# Patient Record
Sex: MALE | Race: WHITE | Employment: OTHER | URBAN - METROPOLITAN AREA
[De-identification: names, ages, dates, MRNs, and addresses within clinical notes are randomized per-mention and may not be internally consistent; named-entity substitution may affect disease eponyms.]

---

## 2021-01-01 ENCOUNTER — APPOINTMENT (OUTPATIENT)
Dept: GENERAL RADIOLOGY | Age: 86
DRG: 291 | End: 2021-01-01
Attending: PHYSICIAN ASSISTANT
Payer: MEDICARE

## 2021-01-01 ENCOUNTER — PATIENT OUTREACH (OUTPATIENT)
Dept: CASE MANAGEMENT | Age: 86
End: 2021-01-01

## 2021-01-01 ENCOUNTER — APPOINTMENT (OUTPATIENT)
Dept: CT IMAGING | Age: 86
DRG: 291 | End: 2021-01-01
Attending: STUDENT IN AN ORGANIZED HEALTH CARE EDUCATION/TRAINING PROGRAM
Payer: MEDICARE

## 2021-01-01 ENCOUNTER — APPOINTMENT (OUTPATIENT)
Dept: CT IMAGING | Age: 86
DRG: 291 | End: 2021-01-01
Attending: INTERNAL MEDICINE
Payer: MEDICARE

## 2021-01-01 ENCOUNTER — APPOINTMENT (OUTPATIENT)
Dept: GENERAL RADIOLOGY | Age: 86
DRG: 291 | End: 2021-01-01
Attending: STUDENT IN AN ORGANIZED HEALTH CARE EDUCATION/TRAINING PROGRAM
Payer: MEDICARE

## 2021-01-01 ENCOUNTER — HOSPITAL ENCOUNTER (INPATIENT)
Age: 86
LOS: 4 days | Discharge: NURSING, REHAB OR LONG-TERM CARE | DRG: 291 | End: 2021-12-24
Attending: STUDENT IN AN ORGANIZED HEALTH CARE EDUCATION/TRAINING PROGRAM | Admitting: INTERNAL MEDICINE
Payer: MEDICARE

## 2021-01-01 ENCOUNTER — APPOINTMENT (OUTPATIENT)
Dept: NON INVASIVE DIAGNOSTICS | Age: 86
DRG: 291 | End: 2021-01-01
Attending: INTERNAL MEDICINE
Payer: MEDICARE

## 2021-01-01 VITALS
BODY MASS INDEX: 22.84 KG/M2 | DIASTOLIC BLOOD PRESSURE: 77 MMHG | OXYGEN SATURATION: 99 % | WEIGHT: 163.14 LBS | HEIGHT: 71 IN | HEART RATE: 88 BPM | TEMPERATURE: 98.6 F | RESPIRATION RATE: 16 BRPM | SYSTOLIC BLOOD PRESSURE: 124 MMHG

## 2021-01-01 DIAGNOSIS — J90 PLEURAL EFFUSION: ICD-10-CM

## 2021-01-01 DIAGNOSIS — S09.90XA INJURY OF HEAD, INITIAL ENCOUNTER: ICD-10-CM

## 2021-01-01 DIAGNOSIS — J18.9 PNEUMONIA OF LEFT LOWER LOBE DUE TO INFECTIOUS ORGANISM: Primary | ICD-10-CM

## 2021-01-01 LAB
ALBUMIN SERPL-MCNC: 2.3 G/DL (ref 3.5–5)
ALBUMIN SERPL-MCNC: 2.7 G/DL (ref 3.5–5)
ALBUMIN/GLOB SERPL: 0.5 {RATIO} (ref 1.1–2.2)
ALBUMIN/GLOB SERPL: 0.6 {RATIO} (ref 1.1–2.2)
ALP SERPL-CCNC: 72 U/L (ref 45–117)
ALP SERPL-CCNC: 79 U/L (ref 45–117)
ALT SERPL-CCNC: 6 U/L (ref 12–78)
ALT SERPL-CCNC: <6 U/L (ref 12–78)
ANION GAP SERPL CALC-SCNC: 3 MMOL/L (ref 5–15)
ANION GAP SERPL CALC-SCNC: 4 MMOL/L (ref 5–15)
ANION GAP SERPL CALC-SCNC: 4 MMOL/L (ref 5–15)
ANION GAP SERPL CALC-SCNC: 6 MMOL/L (ref 5–15)
ANION GAP SERPL CALC-SCNC: 6 MMOL/L (ref 5–15)
APPEARANCE UR: ABNORMAL
AST SERPL-CCNC: 10 U/L (ref 15–37)
AST SERPL-CCNC: 13 U/L (ref 15–37)
ATRIAL RATE: 129 BPM
BACTERIA SPEC CULT: NORMAL
BACTERIA URNS QL MICRO: NEGATIVE /HPF
BASOPHILS # BLD: 0 K/UL (ref 0–0.1)
BASOPHILS NFR BLD: 0 % (ref 0–1)
BILIRUB SERPL-MCNC: 0.8 MG/DL (ref 0.2–1)
BILIRUB SERPL-MCNC: 1 MG/DL (ref 0.2–1)
BILIRUB UR QL CFM: NEGATIVE
BNP SERPL-MCNC: 3684 PG/ML
BUN SERPL-MCNC: 20 MG/DL (ref 6–20)
BUN SERPL-MCNC: 20 MG/DL (ref 6–20)
BUN SERPL-MCNC: 22 MG/DL (ref 6–20)
BUN SERPL-MCNC: 31 MG/DL (ref 6–20)
BUN SERPL-MCNC: 38 MG/DL (ref 6–20)
BUN/CREAT SERPL: 15 (ref 12–20)
BUN/CREAT SERPL: 15 (ref 12–20)
BUN/CREAT SERPL: 16 (ref 12–20)
BUN/CREAT SERPL: 19 (ref 12–20)
BUN/CREAT SERPL: 23 (ref 12–20)
CALCIUM SERPL-MCNC: 8.3 MG/DL (ref 8.5–10.1)
CALCIUM SERPL-MCNC: 8.4 MG/DL (ref 8.5–10.1)
CALCIUM SERPL-MCNC: 8.5 MG/DL (ref 8.5–10.1)
CALCIUM SERPL-MCNC: 8.7 MG/DL (ref 8.5–10.1)
CALCIUM SERPL-MCNC: 9 MG/DL (ref 8.5–10.1)
CALCULATED R AXIS, ECG10: 52 DEGREES
CALCULATED T AXIS, ECG11: 37 DEGREES
CHLORIDE SERPL-SCNC: 100 MMOL/L (ref 97–108)
CHLORIDE SERPL-SCNC: 100 MMOL/L (ref 97–108)
CHLORIDE SERPL-SCNC: 102 MMOL/L (ref 97–108)
CHLORIDE SERPL-SCNC: 107 MMOL/L (ref 97–108)
CHLORIDE SERPL-SCNC: 110 MMOL/L (ref 97–108)
CO2 SERPL-SCNC: 28 MMOL/L (ref 21–32)
CO2 SERPL-SCNC: 28 MMOL/L (ref 21–32)
CO2 SERPL-SCNC: 33 MMOL/L (ref 21–32)
CO2 SERPL-SCNC: 35 MMOL/L (ref 21–32)
CO2 SERPL-SCNC: 37 MMOL/L (ref 21–32)
COLOR UR: ABNORMAL
COVID-19 RAPID TEST, COVR: NOT DETECTED
COVID-19 RAPID TEST, COVR: NOT DETECTED
CREAT SERPL-MCNC: 1.24 MG/DL (ref 0.7–1.3)
CREAT SERPL-MCNC: 1.36 MG/DL (ref 0.7–1.3)
CREAT SERPL-MCNC: 1.42 MG/DL (ref 0.7–1.3)
CREAT SERPL-MCNC: 1.64 MG/DL (ref 0.7–1.3)
CREAT SERPL-MCNC: 1.65 MG/DL (ref 0.7–1.3)
DIAGNOSIS, 93000: NORMAL
DIFFERENTIAL METHOD BLD: ABNORMAL
ECHO AO ASC DIAM: 3.6 CM
ECHO AO ASCENDING AORTA INDEX: 1.66 CM/M2
ECHO AV AREA PEAK VELOCITY: 2.9 CM2
ECHO AV AREA PEAK VELOCITY: 2.9 CM2
ECHO AV AREA VTI: 3 CM2
ECHO AV AREA VTI: 3 CM2
ECHO AV MEAN GRADIENT: 3 MMHG
ECHO AV MEAN VELOCITY: 0.9 M/S
ECHO AV PEAK GRADIENT: 5 MMHG
ECHO AV PEAK VELOCITY: 1.1 M/S
ECHO AV VELOCITY RATIO: 0.64
ECHO AV VTI: 22.2 CM
ECHO LA DIAMETER INDEX: 1.94 CM/M2
ECHO LA DIAMETER: 4.2 CM
ECHO LA VOL 2C: 77 ML (ref 18–58)
ECHO LA VOL 4C: 83 ML (ref 18–58)
ECHO LA VOL BP: 80 ML (ref 18–58)
ECHO LA VOL BP: 80 ML (ref 18–58)
ECHO LA VOLUME AREA LENGTH: 87 ML
ECHO LA VOLUME INDEX A2C: 35 ML/M2 (ref 16–34)
ECHO LA VOLUME INDEX A4C: 38 ML/M2 (ref 16–34)
ECHO LA VOLUME INDEX AREA LENGTH: 40 ML/M2 (ref 16–34)
ECHO LV E' LATERAL VELOCITY: 13 CM/S
ECHO LV E' SEPTAL VELOCITY: 9 CM/S
ECHO LV FRACTIONAL SHORTENING: 30 % (ref 28–44)
ECHO LV INTERNAL DIMENSION DIASTOLE INDEX: 1.84 CM/M2
ECHO LV INTERNAL DIMENSION DIASTOLIC: 4 CM (ref 4.2–5.9)
ECHO LV INTERNAL DIMENSION SYSTOLIC INDEX: 1.29 CM/M2
ECHO LV INTERNAL DIMENSION SYSTOLIC: 2.8 CM
ECHO LV IVSD: 1.4 CM (ref 0.6–1)
ECHO LV MASS 2D: 209 G (ref 88–224)
ECHO LV MASS INDEX 2D: 96.3 G/M2 (ref 49–115)
ECHO LV POSTERIOR WALL DIASTOLIC: 1.4 CM (ref 0.6–1)
ECHO LV RELATIVE WALL THICKNESS RATIO: 0.7
ECHO LVOT AREA: 4.5 CM2
ECHO LVOT AV VTI INDEX: 0.67
ECHO LVOT DIAM: 2.4 CM
ECHO LVOT MEAN GRADIENT: 1 MMHG
ECHO LVOT PEAK GRADIENT: 2 MMHG
ECHO LVOT PEAK VELOCITY: 0.7 M/S
ECHO LVOT STROKE VOLUME INDEX: 31 ML/M2
ECHO LVOT SV: 67.4 ML
ECHO LVOT VTI: 14.9 CM
ECHO MV A VELOCITY: 0.27 M/S
ECHO MV E DECELERATION TIME (DT): 149.3 MS
ECHO MV E VELOCITY: 0.85 M/S
ECHO MV E/A RATIO: 3.15
ECHO MV E/E' LATERAL: 6.54
ECHO MV E/E' RATIO (AVERAGED): 7.99
ECHO MV E/E' SEPTAL: 9.44
ECHO MV REGURGITANT PEAK GRADIENT: 38 MMHG
ECHO MV REGURGITANT PEAK VELOCITY: 3.1 M/S
ECHO PV MAX VELOCITY: 0.8 M/S
ECHO PV PEAK GRADIENT: 2 MMHG
ECHO RV INTERNAL DIMENSION: 4 CM
ECHO RV TAPSE: 1.7 CM (ref 1.5–2)
ECHO TV REGURGITANT MAX VELOCITY: 2.83 M/S
ECHO TV REGURGITANT PEAK GRADIENT: 33 MMHG
EOSINOPHIL # BLD: 0 K/UL (ref 0–0.4)
EOSINOPHIL # BLD: 0 K/UL (ref 0–0.4)
EOSINOPHIL # BLD: 0.1 K/UL (ref 0–0.4)
EOSINOPHIL # BLD: 0.1 K/UL (ref 0–0.4)
EOSINOPHIL # BLD: 0.2 K/UL (ref 0–0.4)
EOSINOPHIL NFR BLD: 0 % (ref 0–7)
EOSINOPHIL NFR BLD: 1 % (ref 0–7)
EOSINOPHIL NFR BLD: 1 % (ref 0–7)
EOSINOPHIL NFR BLD: 2 % (ref 0–7)
EOSINOPHIL NFR BLD: 2 % (ref 0–7)
EPITH CASTS URNS QL MICRO: ABNORMAL /LPF
ERYTHROCYTE [DISTWIDTH] IN BLOOD BY AUTOMATED COUNT: 14.1 % (ref 11.5–14.5)
ERYTHROCYTE [DISTWIDTH] IN BLOOD BY AUTOMATED COUNT: 14.3 % (ref 11.5–14.5)
ERYTHROCYTE [DISTWIDTH] IN BLOOD BY AUTOMATED COUNT: 14.6 % (ref 11.5–14.5)
ERYTHROCYTE [DISTWIDTH] IN BLOOD BY AUTOMATED COUNT: 14.6 % (ref 11.5–14.5)
ERYTHROCYTE [DISTWIDTH] IN BLOOD BY AUTOMATED COUNT: 14.7 % (ref 11.5–14.5)
EST. AVERAGE GLUCOSE BLD GHB EST-MCNC: 126 MG/DL
FERRITIN SERPL-MCNC: 287 NG/ML (ref 26–388)
FOLATE SERPL-MCNC: 12.8 NG/ML (ref 5–21)
GLOBULIN SER CALC-MCNC: 4.6 G/DL (ref 2–4)
GLOBULIN SER CALC-MCNC: 4.9 G/DL (ref 2–4)
GLUCOSE SERPL-MCNC: 125 MG/DL (ref 65–100)
GLUCOSE SERPL-MCNC: 126 MG/DL (ref 65–100)
GLUCOSE SERPL-MCNC: 126 MG/DL (ref 65–100)
GLUCOSE SERPL-MCNC: 156 MG/DL (ref 65–100)
GLUCOSE SERPL-MCNC: 183 MG/DL (ref 65–100)
GLUCOSE UR STRIP.AUTO-MCNC: NEGATIVE MG/DL
HBA1C MFR BLD: 6 % (ref 4–5.6)
HCT VFR BLD AUTO: 27.9 % (ref 36.6–50.3)
HCT VFR BLD AUTO: 29.3 % (ref 36.6–50.3)
HCT VFR BLD AUTO: 30 % (ref 36.6–50.3)
HCT VFR BLD AUTO: 31.4 % (ref 36.6–50.3)
HCT VFR BLD AUTO: 31.5 % (ref 36.6–50.3)
HGB BLD-MCNC: 8.7 G/DL (ref 12.1–17)
HGB BLD-MCNC: 9 G/DL (ref 12.1–17)
HGB BLD-MCNC: 9.2 G/DL (ref 12.1–17)
HGB BLD-MCNC: 9.7 G/DL (ref 12.1–17)
HGB BLD-MCNC: 9.8 G/DL (ref 12.1–17)
HGB UR QL STRIP: ABNORMAL
HYALINE CASTS URNS QL MICRO: ABNORMAL /LPF (ref 0–5)
IMM GRANULOCYTES # BLD AUTO: 0 K/UL (ref 0–0.04)
IMM GRANULOCYTES # BLD AUTO: 0.1 K/UL (ref 0–0.04)
IMM GRANULOCYTES # BLD AUTO: 0.1 K/UL (ref 0–0.04)
IMM GRANULOCYTES NFR BLD AUTO: 1 % (ref 0–0.5)
IRON SATN MFR SERPL: 11 % (ref 20–50)
IRON SERPL-MCNC: 23 UG/DL (ref 35–150)
KETONES UR QL STRIP.AUTO: ABNORMAL MG/DL
LACTATE SERPL-SCNC: 1.1 MMOL/L (ref 0.4–2)
LEUKOCYTE ESTERASE UR QL STRIP.AUTO: NEGATIVE
LIPASE SERPL-CCNC: 20 U/L (ref 73–393)
LYMPHOCYTES # BLD: 0.4 K/UL (ref 0.8–3.5)
LYMPHOCYTES # BLD: 0.9 K/UL (ref 0.8–3.5)
LYMPHOCYTES # BLD: 1.2 K/UL (ref 0.8–3.5)
LYMPHOCYTES # BLD: 1.2 K/UL (ref 0.8–3.5)
LYMPHOCYTES # BLD: 1.3 K/UL (ref 0.8–3.5)
LYMPHOCYTES NFR BLD: 10 % (ref 12–49)
LYMPHOCYTES NFR BLD: 15 % (ref 12–49)
LYMPHOCYTES NFR BLD: 16 % (ref 12–49)
LYMPHOCYTES NFR BLD: 16 % (ref 12–49)
LYMPHOCYTES NFR BLD: 4 % (ref 12–49)
MAGNESIUM SERPL-MCNC: 2.3 MG/DL (ref 1.6–2.4)
MCH RBC QN AUTO: 25.7 PG (ref 26–34)
MCH RBC QN AUTO: 25.9 PG (ref 26–34)
MCH RBC QN AUTO: 26 PG (ref 26–34)
MCH RBC QN AUTO: 26.4 PG (ref 26–34)
MCH RBC QN AUTO: 26.5 PG (ref 26–34)
MCHC RBC AUTO-ENTMCNC: 30.7 G/DL (ref 30–36.5)
MCHC RBC AUTO-ENTMCNC: 30.7 G/DL (ref 30–36.5)
MCHC RBC AUTO-ENTMCNC: 30.8 G/DL (ref 30–36.5)
MCHC RBC AUTO-ENTMCNC: 31.2 G/DL (ref 30–36.5)
MCHC RBC AUTO-ENTMCNC: 31.2 G/DL (ref 30–36.5)
MCV RBC AUTO: 83.7 FL (ref 80–99)
MCV RBC AUTO: 84.2 FL (ref 80–99)
MCV RBC AUTO: 84.5 FL (ref 80–99)
MCV RBC AUTO: 84.7 FL (ref 80–99)
MCV RBC AUTO: 84.9 FL (ref 80–99)
MONOCYTES # BLD: 0.8 K/UL (ref 0–1)
MONOCYTES # BLD: 0.9 K/UL (ref 0–1)
MONOCYTES # BLD: 0.9 K/UL (ref 0–1)
MONOCYTES # BLD: 1 K/UL (ref 0–1)
MONOCYTES # BLD: 1 K/UL (ref 0–1)
MONOCYTES NFR BLD: 11 % (ref 5–13)
MONOCYTES NFR BLD: 12 % (ref 5–13)
MONOCYTES NFR BLD: 9 % (ref 5–13)
NEUTS SEG # BLD: 5.1 K/UL (ref 1.8–8)
NEUTS SEG # BLD: 5.6 K/UL (ref 1.8–8)
NEUTS SEG # BLD: 5.8 K/UL (ref 1.8–8)
NEUTS SEG # BLD: 6.6 K/UL (ref 1.8–8)
NEUTS SEG # BLD: 8.1 K/UL (ref 1.8–8)
NEUTS SEG NFR BLD: 69 % (ref 32–75)
NEUTS SEG NFR BLD: 70 % (ref 32–75)
NEUTS SEG NFR BLD: 71 % (ref 32–75)
NEUTS SEG NFR BLD: 76 % (ref 32–75)
NEUTS SEG NFR BLD: 86 % (ref 32–75)
NITRITE UR QL STRIP.AUTO: NEGATIVE
NRBC # BLD: 0 K/UL (ref 0–0.01)
NRBC BLD-RTO: 0 PER 100 WBC
PH UR STRIP: 5 [PH] (ref 5–8)
PHOSPHATE SERPL-MCNC: 3.7 MG/DL (ref 2.6–4.7)
PLATELET # BLD AUTO: 244 K/UL (ref 150–400)
PLATELET # BLD AUTO: 285 K/UL (ref 150–400)
PLATELET # BLD AUTO: 291 K/UL (ref 150–400)
PLATELET # BLD AUTO: 299 K/UL (ref 150–400)
PLATELET # BLD AUTO: 328 K/UL (ref 150–400)
PMV BLD AUTO: 8.7 FL (ref 8.9–12.9)
PMV BLD AUTO: 8.8 FL (ref 8.9–12.9)
POTASSIUM SERPL-SCNC: 3.6 MMOL/L (ref 3.5–5.1)
POTASSIUM SERPL-SCNC: 3.9 MMOL/L (ref 3.5–5.1)
POTASSIUM SERPL-SCNC: 3.9 MMOL/L (ref 3.5–5.1)
POTASSIUM SERPL-SCNC: 4.1 MMOL/L (ref 3.5–5.1)
POTASSIUM SERPL-SCNC: 4.3 MMOL/L (ref 3.5–5.1)
PROCALCITONIN SERPL-MCNC: 0.13 NG/ML
PROT SERPL-MCNC: 6.9 G/DL (ref 6.4–8.2)
PROT SERPL-MCNC: 7.6 G/DL (ref 6.4–8.2)
PROT UR STRIP-MCNC: 100 MG/DL
Q-T INTERVAL, ECG07: 264 MS
QRS DURATION, ECG06: 74 MS
QTC CALCULATION (BEZET), ECG08: 382 MS
RBC # BLD AUTO: 3.3 M/UL (ref 4.1–5.7)
RBC # BLD AUTO: 3.5 M/UL (ref 4.1–5.7)
RBC # BLD AUTO: 3.54 M/UL (ref 4.1–5.7)
RBC # BLD AUTO: 3.7 M/UL (ref 4.1–5.7)
RBC # BLD AUTO: 3.74 M/UL (ref 4.1–5.7)
RBC #/AREA URNS HPF: ABNORMAL /HPF (ref 0–5)
RBC MORPH BLD: ABNORMAL
SERVICE CMNT-IMP: NORMAL
SODIUM SERPL-SCNC: 139 MMOL/L (ref 136–145)
SODIUM SERPL-SCNC: 140 MMOL/L (ref 136–145)
SODIUM SERPL-SCNC: 141 MMOL/L (ref 136–145)
SODIUM SERPL-SCNC: 141 MMOL/L (ref 136–145)
SODIUM SERPL-SCNC: 142 MMOL/L (ref 136–145)
SOURCE, COVRS: NORMAL
SOURCE, COVRS: NORMAL
SP GR UR REFRACTOMETRY: 1.02 (ref 1–1.03)
TIBC SERPL-MCNC: 207 UG/DL (ref 250–450)
TROPONIN-HIGH SENSITIVITY: 15 NG/L (ref 0–76)
TROPONIN-HIGH SENSITIVITY: 15 NG/L (ref 0–76)
TROPONIN-HIGH SENSITIVITY: 20 NG/L (ref 0–76)
TSH SERPL DL<=0.05 MIU/L-ACNC: 3.78 UIU/ML (ref 0.36–3.74)
UA: UC IF INDICATED,UAUC: ABNORMAL
UROBILINOGEN UR QL STRIP.AUTO: 1 EU/DL (ref 0.2–1)
VENTRICULAR RATE, ECG03: 126 BPM
VIT B12 SERPL-MCNC: 632 PG/ML (ref 193–986)
WBC # BLD AUTO: 7.4 K/UL (ref 4.1–11.1)
WBC # BLD AUTO: 7.8 K/UL (ref 4.1–11.1)
WBC # BLD AUTO: 8.2 K/UL (ref 4.1–11.1)
WBC # BLD AUTO: 8.6 K/UL (ref 4.1–11.1)
WBC # BLD AUTO: 9.4 K/UL (ref 4.1–11.1)
WBC URNS QL MICRO: ABNORMAL /HPF (ref 0–4)

## 2021-01-01 PROCEDURE — 74177 CT ABD & PELVIS W/CONTRAST: CPT

## 2021-01-01 PROCEDURE — 97165 OT EVAL LOW COMPLEX 30 MIN: CPT

## 2021-01-01 PROCEDURE — 81001 URINALYSIS AUTO W/SCOPE: CPT

## 2021-01-01 PROCEDURE — 97116 GAIT TRAINING THERAPY: CPT

## 2021-01-01 PROCEDURE — 65660000000 HC RM CCU STEPDOWN

## 2021-01-01 PROCEDURE — 80048 BASIC METABOLIC PNL TOTAL CA: CPT

## 2021-01-01 PROCEDURE — 94760 N-INVAS EAR/PLS OXIMETRY 1: CPT

## 2021-01-01 PROCEDURE — 93306 TTE W/DOPPLER COMPLETE: CPT | Performed by: STUDENT IN AN ORGANIZED HEALTH CARE EDUCATION/TRAINING PROGRAM

## 2021-01-01 PROCEDURE — 36415 COLL VENOUS BLD VENIPUNCTURE: CPT

## 2021-01-01 PROCEDURE — 97530 THERAPEUTIC ACTIVITIES: CPT

## 2021-01-01 PROCEDURE — 80053 COMPREHEN METABOLIC PANEL: CPT

## 2021-01-01 PROCEDURE — 71045 X-RAY EXAM CHEST 1 VIEW: CPT

## 2021-01-01 PROCEDURE — 74011000636 HC RX REV CODE- 636: Performed by: RADIOLOGY

## 2021-01-01 PROCEDURE — 83605 ASSAY OF LACTIC ACID: CPT

## 2021-01-01 PROCEDURE — 82746 ASSAY OF FOLIC ACID SERUM: CPT

## 2021-01-01 PROCEDURE — 96365 THER/PROPH/DIAG IV INF INIT: CPT

## 2021-01-01 PROCEDURE — 97161 PT EVAL LOW COMPLEX 20 MIN: CPT

## 2021-01-01 PROCEDURE — 74011250637 HC RX REV CODE- 250/637: Performed by: INTERNAL MEDICINE

## 2021-01-01 PROCEDURE — 85025 COMPLETE CBC W/AUTO DIFF WBC: CPT

## 2021-01-01 PROCEDURE — 94761 N-INVAS EAR/PLS OXIMETRY MLT: CPT

## 2021-01-01 PROCEDURE — 87635 SARS-COV-2 COVID-19 AMP PRB: CPT

## 2021-01-01 PROCEDURE — 74011250636 HC RX REV CODE- 250/636: Performed by: STUDENT IN AN ORGANIZED HEALTH CARE EDUCATION/TRAINING PROGRAM

## 2021-01-01 PROCEDURE — 83540 ASSAY OF IRON: CPT

## 2021-01-01 PROCEDURE — 74011250636 HC RX REV CODE- 250/636: Performed by: INTERNAL MEDICINE

## 2021-01-01 PROCEDURE — 83735 ASSAY OF MAGNESIUM: CPT

## 2021-01-01 PROCEDURE — 83880 ASSAY OF NATRIURETIC PEPTIDE: CPT

## 2021-01-01 PROCEDURE — 65270000029 HC RM PRIVATE

## 2021-01-01 PROCEDURE — 72125 CT NECK SPINE W/O DYE: CPT

## 2021-01-01 PROCEDURE — 74011000258 HC RX REV CODE- 258: Performed by: STUDENT IN AN ORGANIZED HEALTH CARE EDUCATION/TRAINING PROGRAM

## 2021-01-01 PROCEDURE — 97535 SELF CARE MNGMENT TRAINING: CPT

## 2021-01-01 PROCEDURE — 93306 TTE W/DOPPLER COMPLETE: CPT

## 2021-01-01 PROCEDURE — 82607 VITAMIN B-12: CPT

## 2021-01-01 PROCEDURE — 83690 ASSAY OF LIPASE: CPT

## 2021-01-01 PROCEDURE — 99285 EMERGENCY DEPT VISIT HI MDM: CPT

## 2021-01-01 PROCEDURE — 84484 ASSAY OF TROPONIN QUANT: CPT

## 2021-01-01 PROCEDURE — 82728 ASSAY OF FERRITIN: CPT

## 2021-01-01 PROCEDURE — 70450 CT HEAD/BRAIN W/O DYE: CPT

## 2021-01-01 PROCEDURE — 74011000250 HC RX REV CODE- 250: Performed by: STUDENT IN AN ORGANIZED HEALTH CARE EDUCATION/TRAINING PROGRAM

## 2021-01-01 PROCEDURE — 71275 CT ANGIOGRAPHY CHEST: CPT

## 2021-01-01 PROCEDURE — 83036 HEMOGLOBIN GLYCOSYLATED A1C: CPT

## 2021-01-01 PROCEDURE — 0HQ0XZZ REPAIR SCALP SKIN, EXTERNAL APPROACH: ICD-10-PCS | Performed by: INTERNAL MEDICINE

## 2021-01-01 PROCEDURE — 87040 BLOOD CULTURE FOR BACTERIA: CPT

## 2021-01-01 PROCEDURE — 74011250637 HC RX REV CODE- 250/637: Performed by: STUDENT IN AN ORGANIZED HEALTH CARE EDUCATION/TRAINING PROGRAM

## 2021-01-01 PROCEDURE — 77010033678 HC OXYGEN DAILY

## 2021-01-01 PROCEDURE — 84145 PROCALCITONIN (PCT): CPT

## 2021-01-01 PROCEDURE — 84443 ASSAY THYROID STIM HORMONE: CPT

## 2021-01-01 PROCEDURE — 84100 ASSAY OF PHOSPHORUS: CPT

## 2021-01-01 PROCEDURE — 93005 ELECTROCARDIOGRAM TRACING: CPT

## 2021-01-01 PROCEDURE — 75810000293 HC SIMP/SUPERF WND  RPR

## 2021-01-01 RX ORDER — MIDODRINE HYDROCHLORIDE 2.5 MG/1
2.5 TABLET ORAL 3 TIMES DAILY
COMMUNITY
End: 2022-01-01

## 2021-01-01 RX ORDER — SODIUM CHLORIDE 0.9 % (FLUSH) 0.9 %
5-40 SYRINGE (ML) INJECTION EVERY 8 HOURS
Status: DISCONTINUED | OUTPATIENT
Start: 2021-01-01 | End: 2021-01-01 | Stop reason: HOSPADM

## 2021-01-01 RX ORDER — FUROSEMIDE 10 MG/ML
40 INJECTION INTRAMUSCULAR; INTRAVENOUS EVERY 12 HOURS
Status: DISCONTINUED | OUTPATIENT
Start: 2021-01-01 | End: 2021-01-01 | Stop reason: HOSPADM

## 2021-01-01 RX ORDER — SODIUM CHLORIDE 0.9 % (FLUSH) 0.9 %
5-40 SYRINGE (ML) INJECTION AS NEEDED
Status: DISCONTINUED | OUTPATIENT
Start: 2021-01-01 | End: 2021-01-01 | Stop reason: HOSPADM

## 2021-01-01 RX ORDER — ONDANSETRON 4 MG/1
4 TABLET, ORALLY DISINTEGRATING ORAL
Status: DISCONTINUED | OUTPATIENT
Start: 2021-01-01 | End: 2021-01-01 | Stop reason: HOSPADM

## 2021-01-01 RX ORDER — LEVOFLOXACIN 5 MG/ML
750 INJECTION, SOLUTION INTRAVENOUS EVERY 24 HOURS
Status: DISCONTINUED | OUTPATIENT
Start: 2021-01-01 | End: 2021-01-01

## 2021-01-01 RX ORDER — ACETAMINOPHEN 325 MG/1
650 TABLET ORAL
Status: DISCONTINUED | OUTPATIENT
Start: 2021-01-01 | End: 2021-01-01 | Stop reason: HOSPADM

## 2021-01-01 RX ORDER — CARBIDOPA AND LEVODOPA 25; 100 MG/1; MG/1
1.5 TABLET ORAL 4 TIMES DAILY
COMMUNITY
Start: 2022-01-01

## 2021-01-01 RX ORDER — CARBIDOPA AND LEVODOPA 25; 100 MG/1; MG/1
1.5 TABLET ORAL 4 TIMES DAILY
Status: DISCONTINUED | OUTPATIENT
Start: 2021-01-01 | End: 2021-01-01 | Stop reason: HOSPADM

## 2021-01-01 RX ORDER — FUROSEMIDE 10 MG/ML
40 INJECTION INTRAMUSCULAR; INTRAVENOUS DAILY
Status: DISCONTINUED | OUTPATIENT
Start: 2021-01-01 | End: 2021-01-01

## 2021-01-01 RX ORDER — MIDODRINE HYDROCHLORIDE 5 MG/1
2.5 TABLET ORAL 3 TIMES DAILY
Status: DISCONTINUED | OUTPATIENT
Start: 2021-01-01 | End: 2021-01-01 | Stop reason: HOSPADM

## 2021-01-01 RX ORDER — ONDANSETRON 2 MG/ML
4 INJECTION INTRAMUSCULAR; INTRAVENOUS
Status: DISCONTINUED | OUTPATIENT
Start: 2021-01-01 | End: 2021-01-01 | Stop reason: HOSPADM

## 2021-01-01 RX ORDER — LIDOCAINE HYDROCHLORIDE 10 MG/ML
5 INJECTION, SOLUTION EPIDURAL; INFILTRATION; INTRACAUDAL; PERINEURAL ONCE
Status: COMPLETED | OUTPATIENT
Start: 2021-01-01 | End: 2021-01-01

## 2021-01-01 RX ORDER — ACETAMINOPHEN 500 MG
1000 TABLET ORAL
Status: COMPLETED | OUTPATIENT
Start: 2021-01-01 | End: 2021-01-01

## 2021-01-01 RX ORDER — POLYETHYLENE GLYCOL 3350 17 G/17G
17 POWDER, FOR SOLUTION ORAL DAILY PRN
Status: DISCONTINUED | OUTPATIENT
Start: 2021-01-01 | End: 2021-01-01 | Stop reason: HOSPADM

## 2021-01-01 RX ORDER — ACETAMINOPHEN 650 MG/1
650 SUPPOSITORY RECTAL
Status: DISCONTINUED | OUTPATIENT
Start: 2021-01-01 | End: 2021-01-01 | Stop reason: HOSPADM

## 2021-01-01 RX ORDER — SODIUM CHLORIDE 0.9 % (FLUSH) 0.9 %
5-10 SYRINGE (ML) INJECTION AS NEEDED
Status: DISCONTINUED | OUTPATIENT
Start: 2021-01-01 | End: 2021-01-01 | Stop reason: HOSPADM

## 2021-01-01 RX ADMIN — MIDODRINE HYDROCHLORIDE 2.5 MG: 5 TABLET ORAL at 21:21

## 2021-01-01 RX ADMIN — MIDODRINE HYDROCHLORIDE 2.5 MG: 5 TABLET ORAL at 21:18

## 2021-01-01 RX ADMIN — Medication 10 ML: at 06:48

## 2021-01-01 RX ADMIN — Medication 1 CAPSULE: at 09:20

## 2021-01-01 RX ADMIN — MIDODRINE HYDROCHLORIDE 2.5 MG: 5 TABLET ORAL at 22:25

## 2021-01-01 RX ADMIN — CARBIDOPA AND LEVODOPA 1.5 TABLET: 25; 100 TABLET ORAL at 17:56

## 2021-01-01 RX ADMIN — FUROSEMIDE 40 MG: 10 INJECTION, SOLUTION INTRAMUSCULAR; INTRAVENOUS at 09:14

## 2021-01-01 RX ADMIN — Medication 10 ML: at 21:17

## 2021-01-01 RX ADMIN — VANCOMYCIN HYDROCHLORIDE 2250 MG: 5 INJECTION, POWDER, LYOPHILIZED, FOR SOLUTION INTRAVENOUS at 03:11

## 2021-01-01 RX ADMIN — CARBIDOPA AND LEVODOPA 1.5 TABLET: 25; 100 TABLET ORAL at 13:17

## 2021-01-01 RX ADMIN — CARBIDOPA AND LEVODOPA 1.5 TABLET: 25; 100 TABLET ORAL at 21:21

## 2021-01-01 RX ADMIN — FUROSEMIDE 40 MG: 10 INJECTION, SOLUTION INTRAMUSCULAR; INTRAVENOUS at 22:26

## 2021-01-01 RX ADMIN — MIDODRINE HYDROCHLORIDE 2.5 MG: 5 TABLET ORAL at 15:53

## 2021-01-01 RX ADMIN — DEXTRAN 70, GLYCERIN, HYPROMELLOSE 1 DROP: 1; 2; 3 SOLUTION/ DROPS OPHTHALMIC at 11:16

## 2021-01-01 RX ADMIN — MIDODRINE HYDROCHLORIDE 2.5 MG: 5 TABLET ORAL at 15:21

## 2021-01-01 RX ADMIN — CARBIDOPA AND LEVODOPA 1.5 TABLET: 25; 100 TABLET ORAL at 22:26

## 2021-01-01 RX ADMIN — Medication 10 ML: at 13:17

## 2021-01-01 RX ADMIN — CARBIDOPA AND LEVODOPA 1.5 TABLET: 25; 100 TABLET ORAL at 14:02

## 2021-01-01 RX ADMIN — FUROSEMIDE 40 MG: 10 INJECTION, SOLUTION INTRAMUSCULAR; INTRAVENOUS at 09:27

## 2021-01-01 RX ADMIN — Medication 10 ML: at 14:02

## 2021-01-01 RX ADMIN — Medication 1 CAPSULE: at 09:27

## 2021-01-01 RX ADMIN — PIPERACILLIN AND TAZOBACTAM 3.38 G: 3; .375 INJECTION, POWDER, LYOPHILIZED, FOR SOLUTION INTRAVENOUS at 22:36

## 2021-01-01 RX ADMIN — IOPAMIDOL 100 ML: 755 INJECTION, SOLUTION INTRAVENOUS at 05:53

## 2021-01-01 RX ADMIN — CARBIDOPA AND LEVODOPA 1.5 TABLET: 25; 100 TABLET ORAL at 09:27

## 2021-01-01 RX ADMIN — CARBIDOPA AND LEVODOPA 1.5 TABLET: 25; 100 TABLET ORAL at 08:51

## 2021-01-01 RX ADMIN — Medication 10 ML: at 16:28

## 2021-01-01 RX ADMIN — CARBIDOPA AND LEVODOPA 1.5 TABLET: 25; 100 TABLET ORAL at 14:25

## 2021-01-01 RX ADMIN — Medication 10 ML: at 06:24

## 2021-01-01 RX ADMIN — MIDODRINE HYDROCHLORIDE 2.5 MG: 5 TABLET ORAL at 09:27

## 2021-01-01 RX ADMIN — CARBIDOPA AND LEVODOPA 1.5 TABLET: 25; 100 TABLET ORAL at 09:20

## 2021-01-01 RX ADMIN — Medication 10 ML: at 05:00

## 2021-01-01 RX ADMIN — Medication 10 ML: at 22:27

## 2021-01-01 RX ADMIN — CARBIDOPA AND LEVODOPA 1.5 TABLET: 25; 100 TABLET ORAL at 21:17

## 2021-01-01 RX ADMIN — CARBIDOPA AND LEVODOPA 1.5 TABLET: 25; 100 TABLET ORAL at 18:08

## 2021-01-01 RX ADMIN — CARBIDOPA AND LEVODOPA 1.5 TABLET: 25; 100 TABLET ORAL at 13:25

## 2021-01-01 RX ADMIN — LEVOFLOXACIN 750 MG: 5 INJECTION, SOLUTION INTRAVENOUS at 00:12

## 2021-01-01 RX ADMIN — FUROSEMIDE 40 MG: 10 INJECTION, SOLUTION INTRAMUSCULAR; INTRAVENOUS at 21:21

## 2021-01-01 RX ADMIN — Medication 1 CAPSULE: at 08:51

## 2021-01-01 RX ADMIN — LIDOCAINE HYDROCHLORIDE 5 ML: 10 INJECTION, SOLUTION EPIDURAL; INFILTRATION; INTRACAUDAL; PERINEURAL at 23:05

## 2021-01-01 RX ADMIN — CARBIDOPA AND LEVODOPA 1.5 TABLET: 25; 100 TABLET ORAL at 18:38

## 2021-01-01 RX ADMIN — CARBIDOPA AND LEVODOPA 1.5 TABLET: 25; 100 TABLET ORAL at 10:57

## 2021-01-01 RX ADMIN — Medication 10 ML: at 21:24

## 2021-01-01 RX ADMIN — MIDODRINE HYDROCHLORIDE 2.5 MG: 5 TABLET ORAL at 08:51

## 2021-01-01 RX ADMIN — ACETAMINOPHEN 1000 MG: 500 TABLET ORAL at 21:07

## 2021-01-01 RX ADMIN — Medication 1 CAPSULE: at 09:14

## 2021-12-20 PROBLEM — J96.01 ACUTE RESPIRATORY FAILURE WITH HYPOXIA (HCC): Status: ACTIVE | Noted: 2021-01-01

## 2021-12-21 NOTE — PROGRESS NOTES
Dr. Alex Frazier last office note:      Sahara Rhodes 1935   Office/Outpatient Visit  Visit Date: Thu, Aug 26, 2021 11:00 am  Provider: Brandie Schroeder MD (Assistant: Darylene Friedlander, RN )  Location: [facility] [place] AT [facility] ([facility])[address] [place]. [address] [phone]    Electronically signed by Prachi Alvarez MD on  08/26/2021 11:22:00 AM                           Subjective:    CC: Mr. Cecy Rodriguez is a 81year old White male. His primary care physician is Dawood Salguero MD.  This is a 3  month follow-up visit. He presents today with a complaint of edema and shortness of breath. Medication bottles reviewed. He has a history of atrial fibrillation and old cerebrovascular accident. HPI:       Personal history of transient ischemic attack (TIA), and cerebral infarction without residual deficits noted. Atrial Fibrillation:  MD Notes: Admitted for hypotension on 3/2021. Off Furosemide. On Midodrine. Regarding persistent atrial fibrillation: His [id number] score is 4. Current related medications include Eliquis (Apixaban). Current level of activity includes ability to walk with a walker. He has noted shortness of breath, pedal edema and fatigue. He has not been aware of an irregular heartbeat, chest pain or bleeding. A transthoracic ECHO has been done ( official interpretation shows 58/55/4527 Normal LV systolic function with an estimated ejection fraction of 65%. The left atrium is moderately enlarged. There is trace mitral regurgitation. There is trace tricuspid regurgitation. There is trace pulmonary regurgitation. ). Blood pressure at home has been in the 120s over 80s. Regarding dyspnea/shortness of breath: This tends to be worse with exertion. The shortness of breath is better rest.        Additionally, he presents with history of localized edema. the swelling has primarily involved the lower extremities.   Patient is wearing compression stockings. Regarding fatigue/malaise: This has been somewhat intermittent recently. Past Medical History / Family History / Social History:     Last Reviewed on 2021 11:11 AM by John Wood  Past Medical History:     Atrial Fibrillation  Hypertension   Cerebrovascular Accident: in 2016;   Parkinsons   INFLUENZA VACCINE: was last done 2020   COVID-19 VACCINE: was last done 2021 Moderna   PNEUMOCOCCAL VACCINE: was last done      Past Cardiac Procedures/Tests:  Echocardiogram on  Normal LV systolic function with an estimated ejection fraction of 65%. The left atrium is moderately enlarged. There is trace mitral regurgitation. There is trace tricuspid regurgitation. There is trace pulmonary regurgitation. .  Nuclear Study:  Jonah Camacho 16 - Normal myocardial perfusion Tetrofosmin Myoview SPECT imaging. Calculated left ventricular ejection fraction was normal at 62%. Surgical History:   Surgical/Procedural History:   Arthroscopy: Left knee  Cholecystectomy: open procedure; 2021- Dr. Garrett Sheffield   basal cell skin cancer removal     Family History:   Father:  at age 79  ; Positve for Pancreatic cancer; Mother:  at age 80  ; Positive for Congestive Heart Failure; ;     Social History:   Social History:   Occupation: Retired   Marital Status:    Children: 2 children     Tobacco/Alcohol/Supplements:   Last Reviewed on 2021 11:11 AM by John Wood  TOBACCO/ALCOHOL/SUPPLEMENTS   Tobacco: He has never smoked. Alcohol: Drinks alcohol very infrequently. Caffeine:  He admits to consuming caffeine via coffee ( 1 serving per day ) and tea ( 1 serving per day ). Substance Abuse History:   Last Reviewed on 2021 11:11 AM by John Wood  Substance Use/Abuse:   None     Mental Health History:   Last Reviewed on 2021 11:11 AM by John Wood    Communicable Diseases (eg STDs):    Last Reviewed on 2021 11:11 AM by Annmarie Rivers Maureen Almaraz    Current Problems:   Last Reviewed on 8/26/2021 11:11 AM by Rossy Jimenez  Atrial fibrillation  Unspecified atrial fibrillation  Old CVA  Essential hypertension, benign  Essential (primary) hypertension  Shortness of breath  Shortness of Breath  Persistent atrial fibrillation  Hypotension  Cardiac murmur, unspecified  Personal history of transient ischemic attack (TIA), and cerebral infarction without residual deficits  Localized edema  Dizziness and giddiness  Dyspnea, unspecified    Allergies:   Last Reviewed on 8/26/2021 11:11 AM by Rossy Jimenez  No Known Allergies. Current Medications:   Last Reviewed on 8/26/2021 11:11 AM by Rossy Jimenez  senna  [prn]  Eliquis 5 mg oral tablet [TAKE 1 TABLET BY MOUTH TWICE DAILY]  carbidopa-levodopa  mg oral tablet [1 1/2 po Qid]  midodrine 2.5 mg oral tablet [TAKE 1 TABLET(2.5 MG) BY MOUTH THREE TIMES DAILY]  levocetirizine 5 mg oral tablet [take 1 tablet (5 mg) by oral route once daily at bedtime]    Objective:    Vitals:     Historical:   5/24/2021  BP:   143/76 mm Hg ( (left arm, , standing, );) 5/24/2021  BP:   139/90 mm Hg ( (left arm, , sitting, );) 5/24/2021  Wt:   179lbs  Current: 8/26/2021 11:15:38 AM  Ht:  6 ft, 1 in; Wt: 185 lbs;  BMI: 24.4    Exams:   GENERAL:  Alert, oriented to person, place and time. HEENT:  Pinkish palpebral  conjunctivae. Anicteric sclerae. NECK:  No jugular vein engorgement. No bruit. CHEST: Equal expansion. Clear breath sounds. No rales, no wheezing. Heart: Irregular rhythm. Grade 2/6 systolic ejection murmur at the left sternal border area. ABDOMEN:  Soft. Normal active bowel sounds. No tenderness. Extremities: 2+ pitting pedal edema. Procedures:   Persistent atrial fibrillation    ECG INTERPRETATION: See scanned EKG for results.         Assessment:     Z86.73   Personal history of transient ischemic attack (TIA), and cerebral infarction without residual deficits     I48.1   Persistent atrial fibrillation     I48.1   Persistent atrial fibrillation     R01.1   Cardiac murmur, unspecified     R06.02   Shortness of breath     R60.0   Localized edema     R53.81   Other malaise       R53.83 Other fatigue  ORDERS:     Procedures Ordered:     [id number]  Education and train for pt self-mgmt by qualified, nonphysician, ea 30 minutes; individual pt  (Send-Out)          XECD  Echocardiogram  (In-House)          41779  Electrocardiogram, routine with at least 12 leads; with interpretation and report  (In-House)          Other Orders:     CJ LAGUNAS  (Send-Out)          [id number]  Queried Patient for Tobacco Use  (Send-Out)              Plan:     Persistent atrial fibrillation    *  Plan of care for atrial fibrillation: Follow up visit He is tolerating a rate/rhythm control  with prescribed medications. The patient remains anticoagulated with Eliquis. Patient continues to tolerate anticoagulation therapy. .  1.  Medication list has been reviewed. Continue current medications. Smoking Status:  Nonsmoker   2. Advised the patient regarding diet, exercise, and lifestyle modification. 3.  The patient to call the office if there is any change in his cardiac symptoms. 4.  Explained to the patient the importance of controlling his cardiac risk factors. Schedule a follow-up appointment in 6 months. The above note was transcribed by Shiva Palumbo and authenticated by Dr. Jenna Christie prior to sign off.         Orders:     [id number]  Electrocardiogram, routine with at least 12 leads; with interpretation and report  (In-House)            Other Orders    CJ LAGUNAS  (Send-Out)          [id number]  Queried Patient for Tobacco Use  (Send-Out)          47008  Education and train for pt self-mgmt by qualified, nonphysician, ea 30 minutes; individual pt  (Send-Out)          XECD  Echocardiogram  (In-House)          Other Patient Education Handouts:     COV Atrial Fibrillation      COV Heart Healthy Diet      COV Hypertension      Patient Recommendations: For  Persistent atrial fibrillation:    *  Plan of care for atrial fibrillation: I would like you to continue anticoagulation therapy with Eliquis. 1.  Your medication list has been reviewed. 2.  You have been advised regarding diet, exercise, and lifestyle. modification. 3.  Please call the office if there is any change in your cardiac symptoms. 4.  Explained to you the importance of controlling your cardiac risk factors. Schedule a follow-up visit in 6 months.

## 2021-12-21 NOTE — WOUND CARE
Wound Consult:  New Patient Visit. Chart reviewed. Consulted for laceration to posterior lower scalp just above neck line. Spoke with patients nurse,  Nikole Schwab at bedside. Patient is resting on a Stretcher in ED; Heels off loaded with pillow at conclusion of visit. Patient is alert and cooperative, his wife is at bedside; he requires assist of one to move side to side in bed. Luisito score 15; has penile pouch in use; had leaking as well of urine. Assessment:  Posterior scalp above neck line - linear laceration with 4 staples; well approximated with crusted dark along edges on either side/ecchymosis, injury within hair on scalp. Aleksandra Anil and hit piece of furniture and patient is on Eliquis. Sacrum/gluteal cleft - macerated intact skin, no redness. Left lower buttock - linear dark discoloration ~ 5 x 0.1 cm, no surrounding redness; possibly from fall. No redness to heels. Treatment:  Incontinence skin care; zinc barrier ointment applied. Betadine used to swab staple line, TIM. Wound Recommendations:  Cleanse staple line on back of head daily with betadine swab for 5 days. Leave TIM; staples will need to be removed ~ 7 - 10 days. If at home, can have PCP remove or home health. Skin Care / PI Prevention Recommendations:  1. Minimize friction/shear: minimize layers of linen/pads under patient. 2. Off load pressure/reposition:  turn and reposition approximately every 2 hours; float heels with pillows or use off loading heel boots; waffle cushion for sitting; position wedge. 3. Manage Moisture - keep skin folds dry; incontinence skin care with incontinence wipes; zinc barrier ointment; appropriate sized briefs if needed; penile pouch in use to help contain urine. 4. Continue to monitor nutrition, pain, and skin risk scale, and skin assessment. Plan:  Spoke with Dr. Renzo De Anda regarding findings and proposed orders for treatment. We will continue to reassess routinely and as needed.   Please re-consult should concerns arise despite continued skin/PI prevention measures.     Tasha Adan, MSN, RN, [address] [facility], [facility] / [facility] Office [phone]

## 2021-12-21 NOTE — ED PROVIDER NOTES
Patient is an 78-year-old male presented emergency department after sustaining a ground-level fall striking his head patient is on Eliquis for A. fib. Wife states that he had recently had decreased p.o. intake. Denies patient having any fevers, chills or aspiration. When EMS arrived patient's O2 sats were in the 70s requiring 15 L nonrebreather patient had increased work of breathing was tachypneic. On arrival here patient was febrile axillary 1-1.7 with a elevated heart rate in the 130s showing A. fib with RVR. Wife also indicated that patient recently had bilateral pleural effusions drained by [facility]. Patient has a history of Parkinson's. Patient is also vaccinated for Covid with all 3 doses of Moderna. No past medical history on file. No past surgical history on file. No family history on file. Social History     Socioeconomic History    Marital status:      Spouse name: Not on file    Number of children: Not on file    Years of education: Not on file    Highest education level: Not on file   Occupational History    Not on file   Tobacco Use    Smoking status: Not on file    Smokeless tobacco: Not on file   Substance and Sexual Activity    Alcohol use: Not on file    Drug use: Not on file    Sexual activity: Not on file   Other Topics Concern    Not on file   Social History Narrative    Not on file     Social Determinants of Health     Financial Resource Strain:     Difficulty of Paying Living Expenses: Not on file   Food Insecurity:     Worried About Running Out of Food in the Last Year: Not on file    Magui of Food in the Last Year: Not on file   Transportation Needs:     Lack of Transportation (Medical): Not on file    Lack of Transportation (Non-Medical):  Not on file   Physical Activity:     Days of Exercise per Week: Not on file    Minutes of Exercise per Session: Not on file   Stress:     Feeling of Stress : Not on file Social Connections:     Frequency of Communication with Friends and Family: Not on file    Frequency of Social Gatherings with Friends and Family: Not on file    Attends Bahai Services: Not on file    Active Member of Clubs or Organizations: Not on file    Attends Club or Organization Meetings: Not on file    Marital Status: Not on file   Intimate Partner Violence:     Fear of Current or Ex-Partner: Not on file    Emotionally Abused: Not on file    Physically Abused: Not on file    Sexually Abused: Not on file   Housing Stability:     Unable to Pay for Housing in the Last Year: Not on file    Number of [place] in the Last Year: Not on file    Unstable Housing in the Last Year: Not on file         ALLERGIES: Patient has no known allergies. Review of Systems   Unable to perform ROS: Acuity of condition       Vitals:    12/20/21 2022 12/20/21 2026   BP: (!) 168/82    Pulse: (!) 129    Resp: 30    Temp: (!) 101.7 °F (38.7 °C)    SpO2: (!) 84% 92%   Weight: 96.3 kg (212 lb 4.9 oz)             Physical Exam  Vitals and nursing note reviewed. Constitutional:       General: He is in acute distress. Appearance: He is ill-appearing and toxic-appearing. HENT:      Head:        Comments: Approximately 3 cm open laceration to the left occipital region. Eyes:      Extraocular Movements: Extraocular movements intact. Pupils: Pupils are equal, round, and reactive to light. Cardiovascular:      Rate and Rhythm: Tachycardia present. Rhythm irregular. Pulmonary:      Effort: Tachypnea, accessory muscle usage and respiratory distress present. Breath sounds: Decreased air movement present. Examination of the right-middle field reveals decreased breath sounds. Examination of the left-middle field reveals decreased breath sounds. Examination of the right-lower field reveals decreased breath sounds. Examination of the left-lower field reveals decreased breath sounds.  Decreased breath sounds present. Abdominal:      General: Abdomen is flat. Palpations: Abdomen is soft. Musculoskeletal:         General: Normal range of motion. Cervical back: Normal range of motion and neck supple. Right lower le+ Edema present. Left lower le+ Edema present. Skin:     General: Skin is warm and dry. Neurological:      Mental Status: Mental status is at baseline. Psychiatric:         Mood and Affect: Mood normal.         Behavior: Behavior normal.          MDM  Number of Diagnoses or Management Options  Diagnosis management comments: A/P: Sepsis, closed head injury. 28-year-old male present emergency department after having ground-level fall striking his head initially was concern for intracranial hemorrhage as EMS initially reported patient's blood pressure 445A systolic with a heart rate in the 50s on arrival patient's blood pressure still elevated however patient was tachycardic in the 130s. CT head shows no evidence of intracranial hemorrhage her code sepsis called as patient meets criteria. Patient appears to be volume overloaded with bilateral lower extremity edema also recently requiring pleural effusions drained. Code sepsis order set placed. Amount and/or Complexity of Data Reviewed  Clinical lab tests: ordered and reviewed  Tests in the radiology section of CPT®: ordered and reviewed           Wound Repair    Date/Time: 2021 10:59 PM  Performed by: attendingPreparation: skin prepped with Shur-Clens  Location details: scalp  Wound length:2.6 - 7.5 cm  Anesthesia: local infiltration    Anesthesia:  Local Anesthetic: lidocaine 1% without epinephrine  Skin closure: staples  Number of sutures: 5  Dressing: pressure dressing  My total time at bedside, performing this procedure was 1-15 minutes. ED EKG interpretation:  Rhythm: atrial fib; and irregular.  Rate (approx.): 126; Axis: normal; QRS interval: normal ; ST/T wave: non-specific changes; in  Lead: V4 -V6; Other findings: abnormal ekg. EKG documented by Eros Amaro MD,    Perfect Serve Consult for Admission  10:47 PM    ED Room Number: ER01/01  Patient Name and age:  Midge Eden 81 y.o.  male  Working Diagnosis:   1. Pneumonia of left lower lobe due to infectious organism    2. Pleural effusion    3. Injury of head, initial encounter        COVID-19 Suspicion:  no  Sepsis present:  no  Reassessment needed: no  Code Status:  Full Code  Readmission: no  Isolation Requirements:  no  Recommended Level of Care:  telemetry  Department:[address] ED - [phone]  Other: Total critical care time spent exclusive of procedures:  66 min.

## 2021-12-21 NOTE — PROGRESS NOTES
Problem: Mobility Impaired (Adult and Pediatric)  Goal: *Acute Goals and Plan of Care (Insert Text)  Description: FUNCTIONAL STATUS PRIOR TO ADMISSION: Patient was modified independent using a rolling walker for functional mobility. HOME SUPPORT PRIOR TO ADMISSION: The patient lived with spouse but did not require assist.    Physical Therapy Goals  Initiated 12/21/2021  1. Patient will move from supine to sit and sit to supine , scoot up and down, and roll side to side in bed with independence within 7 day(s). 2.  Patient will transfer from bed to chair and chair to bed with modified independence using the least restrictive device within 7 day(s). 3.  Patient will perform sit to stand with modified independence within 7 day(s). 4.  Patient will ambulate with modified independence for 100 feet with the least restrictive device within 7 day(s). Outcome: Progressing Towards Goal   PHYSICAL THERAPY EVALUATION  Patient: Edgar Greenwood (68 y.o. male)  Date: 12/21/2021  Primary Diagnosis: Acute respiratory failure with hypoxia (HCC) [J96.01]        Precautions: fall       ASSESSMENT  Based on the objective data described below, the patient presents with difficulty with ambulation. Communicated with nurse cleared for therapy. Patient supine on bed in ED room spouse at bedside agreed with all goals set for the patient. Rolled on the edge of bed mod assist, supine to sit mod assist, sit to stand mod assist, ambulate with rolling walker mod assist slow shuffling gait noted during ambulation. Need verbal and tactile cues to advance rolling walker. Assisted back to bed mod assist place bed to modified chair position. Nurse in the room and agreed to monitor patient. Current Level of Function Impacting Discharge (mobility/balance): mod assist with transfers and ambulation using a rolling walker    Functional Outcome Measure:   The patient scored 20/100 on the barthel outcome measure   Other factors to consider for discharge: fall     Patient will benefit from skilled therapy intervention to address the above noted impairments. PLAN :  Recommendations and Planned Interventions: bed mobility training, transfer training, gait training, therapeutic exercises, neuromuscular re-education, orthotic/prosthetic training, patient and family training/education, and therapeutic activities      Frequency/Duration: Patient will be followed by physical therapy:  5 times a week to address goals. Recommendation for discharge: (in order for the patient to meet his/her long term goals)  Physical therapy at least 2 days/week in the home AND ensure assist and/or supervision for safety with rolling walker    This discharge recommendation:  Has been made in collaboration with the attending provider and/or case management    IF patient discharges home will need the following DME: patient owns DME required for discharge         SUBJECTIVE:   Patient stated [address]     OBJECTIVE DATA SUMMARY:   HISTORY:    Past Medical History:   Diagnosis Date    Congestive heart failure (CHF) ([facility] [address]     has had to have lungs drained.     Falls     Orthostatic hypotension     Stroke [facility])     right sided affected     Past Surgical History:   Procedure Laterality Date    HX CHOLECYSTECTOMY      HX KNEE ARTHROSCOPY Left        Personal factors and/or comorbidities impacting plan of care:     Home Situation  Support Systems: Spouse/Significant Other    EXAMINATION/PRESENTATION/DECISION MAKING:   Critical Behavior:     Orientation Level: Oriented to person,Oriented to place,Oriented to situation  Cognition: Follows commands     Hearing:       Range Of Motion:  AROM: Generally decreased, functional           PROM: Generally decreased, functional           Strength:    Strength: Generally decreased, functional                    Tone & Sensation:                                  Coordination:  Coordination: Generally decreased, functional  Vision:      Functional Mobility:  Bed Mobility:  Rolling: Moderate assistance  Supine to Sit: Moderate assistance  Sit to Supine: Moderate assistance  Scooting: Moderate assistance  Transfers:  Sit to Stand: Moderate assistance  Stand to Sit: Moderate assistance  Stand Pivot Transfers: Moderate assistance                    Balance:   Sitting: Intact; High guard  Standing: Impaired;Pull to stand; With support  Standing - Static: Fair  Standing - Dynamic : Fair  Ambulation/Gait Training:  Distance (ft): 5 Feet (ft)  Assistive Device: Walker, rolling;Gait belt  Ambulation - Level of Assistance: Moderate assistance     Gait Description (WDL): Exceptions to WDL  Gait Abnormalities: Path deviations; Step to gait; Shuffling gait        Base of Support: Widened     Speed/Jessenia: Fluctuations; Slow  Step Length: Right shortened;Left shortened                         Therapeutic Exercises:   Educate and instructed patient to continue active range of motion exercise on both legs while up on chair or on bed multiple times. Recommend patient to be up on the chair at least 3 times a day every meal times as tolerated. Functional Measure:  Barthel Index:    Bathin  Bladder: 0  Bowels: 0  Groomin  Dressin  Feedin  Mobility: 0  Stairs: 0  Toilet Use: 5  Transfer (Bed to Chair and Back): 10  Total: 20/100       The Barthel ADL Index: Guidelines  1. The index should be used as a record of what a patient does, not as a record of what a patient could do. 2. The main aim is to establish degree of independence from any help, physical or verbal, however minor and for whatever reason. 3. The need for supervision renders the patient not independent. 4. A patient's performance should be established using the best available evidence. Asking the patient, friends/relatives and nurses are the usual sources, but direct observation and common sense are also important. However direct testing is not needed.   5. Usually the patient's performance over the preceding 24-48 hours is important, but occasionally longer periods will be relevant. 6. Middle categories imply that the patient supplies over 50 per cent of the effort. 7. Use of aids to be independent is allowed. Score Interpretation (from [facility])    Independent   60-79 Minimally independent   40-59 Partially dependent   20-39 Very dependent   <20 Totally dependent     -Glenna Rodriguez., Barthel, D.W. (1965). Functional evaluation: the Barthel Index. [address] ([address] (). The Barthel activities of daily living index: self-reporting versus actual performance in the old (> or = 75 years). Journal of [address] [place] [address] 45(7), [facility], [place], Андрей Keane., Medhat Harris. (1999). Measuring the change in disability after inpatient rehabilitation; comparison of the responsiveness of the Barthel Index and Functional [place] Measure. Journal of Neurology, Neurosurgery, and Psychiatry, 66(4), 201-103. Kiana Guzman, [place]MARIELOS, MARY Cadena, & Bree Lizama M.A. (2004) Assessment of post-stroke quality of life in cost-effectiveness studies: The usefulness of the Barthel Index and the EuroQoL-5D.  Quality of Life Research, 15, 908-07           Physical Therapy Evaluation Charge Determination   History Examination Presentation Decision-Making   LOW Complexity : Zero comorbidities / personal factors that will impact the outcome / POC LOW Complexity : 1-2 Standardized tests and measures addressing body structure, function, activity limitation and / or participation in recreation  LOW Complexity : Stable, uncomplicated  Other outcome measures barthel  LOW       Based on the above components, the patient evaluation is determined to be of the following complexity level: LOW     Pain Ratin/10    Activity Tolerance:   Good    After treatment patient left in no apparent distress:   Supine in bed, Heels elevated for pressure relief, Patient positioned in right sidelying for pressure relief, Call bell within reach, Bed / chair alarm activated, Caregiver / family present, and Side rails x 3    COMMUNICATION/EDUCATION:   The patients plan of care was discussed with: Registered nurse. Fall prevention education was provided and the patient/caregiver indicated understanding. Thank you for this referral.  Janeen Michelle, PT,WCC.    Time Calculation: 28 mins

## 2021-12-21 NOTE — CONSULTS
Jorge Bruce MD, [address] [place] [address]  Denise Gonzalez 34  [phone]    Date of  Admission: 12/20/2021  8:04 PM         IMPRESSION and RECOMMENDATIONS     1. Volume overload: Manifest as LE edema, recurrent pleural effusion, and poss small pericardial effusion. Last echo (11/25/20) showed normal LVF and no sig valvular abnormality. Agree with echo to assess for possible etiology for CHF. Continue midodrine despite elevated BP as this may allow more diuresis. 2.  AF:  Chronic. Usually intrinsically rate-controlled. Mildly tachy here: observe. Holding eliquis due to fall; would resume when feasible. Discussed with Pt and wife. Problem List  Date Reviewed: 12/21/2021          Codes Class Noted    * (Principal) Acute respiratory failure with hypoxia [facility]) ICD-10-CM: J96.01  ICD-9-CM: 518.81  12/20/2021              History of Present Illness:     Robby Fraser is a 81 y.o. male with the above problem list who was admitted for Acute respiratory failure with hypoxia ([facility] [address] [J96.01]. Pt presents s/p GLF resulting in posterior head trauma. This is an 27-year-old man with a past medical history significant for chronic atrial fibrillation, Parkinson's disease, who was in his usual state of health until the day of presentation at the emergency room when the patient fell at home. The patient fell striking his head on the floor. There was a concern for intracranial hemorrhage as a result of this fall. The patient was brought to the emergency room for further evaluation. The patient is a very poor historian, because of his mental status, unable to provide reliable history, but he stated that he fell, because he was unsteady on his feet and while he was using his walker. When the patient arrived at the emergency room, the patient's oxygen saturation was 70% on room air and he was placed on nonrebreather.   There was no history of fever, rigors, or chills. No record of prior admission to this hospital.  According to report, the patient recently underwent thoracentesis by [facility], because of pleural effusion. Notes chronicLE edema, though worsening of late. He denies chest pain/discomfort, shortness of breath, dyspnea on exertion, orthopnea, paroxysmal noctural dyspnea, palpitations, syncope, or near-syncope. Current Facility-Administered Medications   Medication Dose Route Frequency    carbidopa-levodopa (SINEMET)  mg per tablet 1.5 Tablet  1.5 Tablet Oral QID    midodrine (PROAMATINE) tablet 2.5 mg  2.5 mg Oral TID    sodium chloride (NS) flush 5-40 mL  5-40 mL IntraVENous Q8H    sodium chloride (NS) flush 5-40 mL  5-40 mL IntraVENous PRN    acetaminophen (TYLENOL) tablet 650 mg  650 mg Oral Q6H PRN    Or    acetaminophen (TYLENOL) suppository 650 mg  650 mg Rectal Q6H PRN    polyethylene glycol (MIRALAX) packet 17 g  17 g Oral DAILY PRN    ondansetron (ZOFRAN ODT) tablet 4 mg  4 mg Oral Q8H PRN    Or    ondansetron (ZOFRAN) injection 4 mg  4 mg IntraVENous Q6H PRN    L.acidophilus-paracasei-S.thermophil-bifidobacter (RISAQUAD) 8 billion cell capsule  1 Capsule Oral DAILY    furosemide (LASIX) injection 40 mg  40 mg IntraVENous Q12H    sodium chloride (NS) flush 5-10 mL  5-10 mL IntraVENous PRN     Current Outpatient Medications   Medication Sig    apixaban (Eliquis) 5 mg tablet Take 5 mg by mouth two (2) times a day.  midodrine (PROAMATINE) 2.5 mg tablet Take 2.5 mg by mouth three (3) times daily.  carbidopa-levodopa (SINEMET)  mg per tablet Take 1.5 Tablets by mouth four (4) times daily. No Known Allergies   History reviewed. No pertinent family history.    Social History     Socioeconomic History    Marital status:      Spouse name: Not on file    Number of children: Not on file    Years of education: Not on file    Highest education level: Not on file Occupational History    Not on file   Tobacco Use    Smoking status: Not on file    Smokeless tobacco: Not on file   Substance and Sexual Activity    Alcohol use: Not on file    Drug use: Not on file    Sexual activity: Not on file   Other Topics Concern    Not on file   Social History Narrative    Not on file     Social Determinants of Health     Financial Resource Strain:     Difficulty of Paying Living Expenses: Not on file   Food Insecurity:     Worried About Running Out of Food in the Last Year: Not on file    [place] of Food in the Last Year: Not on file   Transportation Needs:     Lack of Transportation (Medical): Not on file    Lack of Transportation (Non-Medical):  Not on file   Physical Activity:     Days of Exercise per Week: Not on file    Minutes of Exercise per Session: Not on file   Stress:     Feeling of Stress : Not on file   Social Connections:     Frequency of Communication with Friends and Family: Not on file    Frequency of Social Gatherings with Friends and Family: Not on file    Attends Baptism Services: Not on file    Active Member of Clubs or Organizations: Not on file    Attends Club or Organization Meetings: Not on file    Marital Status: Not on file   Intimate Partner Violence:     Fear of Current or Ex-Partner: Not on file    Emotionally Abused: Not on file    Physically Abused: Not on file    Sexually Abused: Not on file   Housing Stability:     Unable to Pay for Housing in the Last Year: Not on file    Number of [place] in the Last Year: Not on file    Unstable Housing in the Last Year: Not on file       Physical Exam:     Patient Vitals for the past 16 hrs:   BP Temp Pulse Resp SpO2 Height Weight   12/21/21 0600 (!) 187/86  (!) 103 (!) 33      12/21/21 0500 (!) 168/102  89 29 100 %     12/21/21 0442      5' 11.3\" (1.811 m)    12/21/21 0401 (!) 144/91  77 27 95 %     12/21/21 0301 (!) 147/77  76 27 99 %     12/21/21 0255  97.4 °F (36.3 °C)        12/21/21 0158 (!) 149/79  85 (!) 31 94 %     12/21/21 0101 124/61  80 22 98 %     12/21/21 0004 138/76  93 (!) 38 99 %     12/20/21 2304 (!) 150/82  93 28 98 %     12/20/21 2241 (!) 150/70 98.4 °F (36.9 °C) (!) 101 27 98 %     12/20/21 2204 (!) 151/76  (!) 105 30 97 %     12/20/21 2056 (!) 166/73  (!) 115 (!) 35 96 %     12/20/21 2026     92 %     12/20/21 2026 (!) 167/82  (!) 139 (!) 38 95 %     12/20/21 2022 (!) 168/82 (!) 101.7 °F (38.7 °C) (!) 129 30 (!) 84 %  96.3 kg (212 lb 4.9 oz)       HEENT Exam:     Normocephalic, atraumatic. EOMI. Oropharynx negative. Neck supple. No lymphadenopathy. Lung Exam:     The patient is not dyspneic. There is no cough. Decreased BS at bases. There are no wheezes, rales, rhonchi, or rubs heard on auscultation. Heart Exam:     The rhythm is irregularly irregular. The PMI is in the 5th intercostal space of the MCL. Apical impulse is normal. S1 is regular. S2 is physiologic. There is no S3, S4 gallop, murmur, click, or rub. Abdomen Exam:     Bowel sounds are normoactive. Abdomen soft in all quadrants. No tenderness. No palpable masses. No organomegaly. No hernias noted. No bruits or pulsatile mass. Extremities Exam:     The extremities are atraumatic appearing. There is no clubbing, cyanosis, ulcers, varicose veins, rash, erythemia noted in the extremities. The neurovascular status is grossly intact with normal distal sensation and pulses. Mild LE edema         Vascular Exam:     The radial, brachial, dorsalis pedis, posterior tibial, are equal and strong bilaterally The carotids are equal bilaterally without bruits.           Labs:     Lab Results   Component Value Date/Time    Glucose 125 (H) 12/21/2021 04:12 AM    Sodium 142 12/21/2021 04:12 AM    Potassium 4.3 12/21/2021 04:12 AM    Chloride 110 (H) 12/21/2021 04:12 AM    CO2 28 12/21/2021 04:12 AM    BUN 20 12/21/2021 04:12 AM    Creatinine 1.24 12/21/2021 04:12 AM    Calcium 8.4 (L) 12/21/2021 04:12 AM     Recent Labs     12/21/21  0412 12/20/21 2037   WBC 8.2 9.4   HGB 8.7* 9.7*   HCT 27.9* 31.5*    299     Recent Labs     12/21/21  0412 12/20/21 2037   ALT 6* <6*   AP 72 79   TBILI 1.0 0.8   TP 6.9 7.6   ALB 2.3* 2.7*   GLOB 4.6* 4.9*     No results for input(s): INR, PTP, APTT, INREXT in the last 72 hours. No results for input(s): CPK, CKMB, TNIPOC in the last 72 hours. No lab exists for component: TROPONINI, ITNL  No results for input(s): TROIQ in the last 72 hours.   No results found for: CHOL, CHOLX, CHLST, CHOLV, HDL, HDLP, LDL, LDLC, DLDLP, TGLX, TRIGL, TRIGP, CHHD, CHHDX    EKG:  AF @ 126, NSSTTW abn

## 2021-12-21 NOTE — H&P
[facility] [place] 80  HISTORY AND PHYSICAL    Name:  Piotr Link  MR#:  [id number]  :  1935  ACCOUNT #:  [de-identified]  ADMIT DATE:  2021      The patient was seen, evaluated, and admitted by me on 2021. PRIMARY CARE PHYSICIAN:  Laisha Castro MD    SOURCE OF INFORMATION:  The patient who is not a very good historian and review of ED electronic medical record. CHIEF COMPLAINT:  Fall. HISTORY OF PRESENT ILLNESS:  This is an 56-year-old man with a past medical history significant for chronic atrial fibrillation, Parkinson's disease, who was in his usual state of health until the day of presentation at the emergency room when the patient fell at home. The patient fell striking his head on the floor. There was a concern for intracranial hemorrhage as a result of this fall. The patient was brought to the emergency room for further evaluation. The patient is a very poor historian, because of his mental status, unable to provide reliable history, but he stated that he fell, because he was unsteady on his feet and while he was using his walker. When the patient arrived at the emergency room, the patient's oxygen saturation was 70% on room air and he was placed on nonrebreather. There was no history of fever, rigors, or chills. No record of prior admission to this hospital.  According to report, the patient recently underwent thoracentesis by [facility], because of pleural effusion. PAST MEDICAL HISTORY:  Chronic atrial fibrillation, Parkinson's disease. ALLERGIES:  NO KNOWN DRUG ALLERGIES. MEDICATIONS:  List of home medication is not available for review at this time. FAMILY HISTORY:  Unable to obtain, because of the patient's mental status. PAST SURGICAL HISTORY:  Also unable to obtain, because of the patient's mental status. SOCIAL HISTORY:  No history of alcohol or tobacco abuse reported.     REVIEW OF SYSTEMS:  Unable to obtain, because of the patient's mental status. PHYSICAL EXAMINATION:  GENERAL APPEARANCE:  The patient appeared ill, in moderate distress. VITAL SIGNS:  On arrival at the emergency room, temperature 101.7, pulse 129, respiratory rate 30, blood pressure 162/82, oxygen saturation 84%. HEENT:  Head:  Laceration in left occipital region noted. Normocephalic. Eyes:  Normal eye movement. No redness, no drainage, no discharge. Ears:  Normal external ears with no evidence of drainage. Nose:  No deformity and no drainage. Mouth and Throat:  No visible oral lesion. Dry oral mucosa. NECK:  Neck is supple. Mild JVD, no thyromegaly. CHEST:  Bilateral basilar crackles and few expiratory wheezing. HEART:  Normal S1 and S2, irregularly irregular. No clinically appreciable murmur. ABDOMEN:  Soft, nontender. Normal bowel sounds. CNS:  Alert, oriented to person. No gross focal neurological deficit. EXTREMITIES:  Edema 2+. Pulses 2+ bilaterally. MUSCULOSKELETAL SYSTEM:  No evidence of joint deformity or swelling. SKIN:  About 3-cm laceration in left occipital region with intact dressing. PSYCHIATRY:  Unable to assess mood and affect. LYMPHATIC SYSTEM:  No cervical lymphadenopathy. DIAGNOSTIC DATA:  EKG shows atrial fibrillation and nonspecific ST and T-waves abnormalities. CT scan of the cervical spine without contrast, no acute fracture or subluxation. CT of the head without contrast shows extensive white matter changes likely due to small-vessel ischemic disease, no acute pathology. Chest x-ray shows bilateral pleural effusion with associated passive atelectasis, interstitial prominence which may represent pulmonary vascular congestion, atypical infectious process cannot be excluded. LABORATORY DATA:  Hematology:  WBC 9.4, hemoglobin at 9.7, hematocrit 31.5, platelets 691. Pro-BNP level 3684. Lactic acid level 1.1. Troponin high-sensitivity 15.   Chemistry:  Sodium 141, potassium 4.1, chloride 107, CO2 28, glucose 183, BUN 20, creatinine 1.37, calcium 8.5, total bilirubin 0.8, ALT less than 6, AST 10, alkaline phosphatase 79, total protein at 7.6, albumin level 2.4, globulin at 4.9. Urinalysis: This is significant for negative nitrite, negative leukocyte esterase, negative bacteria. ASSESSMENT:  1. Acute respiratory failure with hypoxia. 2.  Suspected COVID-19 virus infection. 3.  Bacterial pneumonia. 4.  Fall. 5.  Persistent atrial fibrillation. 6.  Scalp laceration. 7.  Parkinson's disease. 8.  Anemia. 9.  Bilateral pleural effusion. 10.  Acute congestive heart failure. 11.  Hyperglycemia. 12.  Suspected sepsis. PLAN:  1. Acute respiratory failure with hypoxia. We will admit the patient for further evaluation and treatment. We will continue with supplemental oxygen. Respiratory failure could be due to bacterial pneumonia, congestive heart failure, and suspected COVID-19 virus. We will obtain CTA of the chest to evaluate the patient for pulmonary embolism as a possible cause of acute respiratory failure with hypoxia. We will check cardiac markers to rule out acute myocardial infarction as another possible cause of acute respiratory failure with hypoxia. We will monitor the patient closely. 2.  Suspected COVID-19 virus infection. Although the patient is fully vaccinated, the CT scan is suggestive of findings consistent with COVID pneumonia. The patient will be tested for COVID. 3.  Bacterial pneumonia. This is most likely superimposed on the suspected COVID-19 virus infection. We will start the patient on doxycycline. 4.  Fall. CT scan of the head is negative for acute pathology. CT scan of the cervical spine is also negative. We will obtain a CT scan of the abdomen and pelvis to evaluate the patient for intra-abdominal injury and bleeding, especially this patient was on Eliquis. 5.  Persistent atrial fibrillation. We will hold Eliquis, because of the head injury. We will check a TSH level. We will monitor the patient closely. 6.  Scalp laceration. This was sutured in the emergency room. Wound Care consult will be requested for local wound care. 7.  Parkinson's disease. We will resume preadmission medication once the complete list of medication is obtained from the patient's spouse and verified by the pharmacy. 8.  Anemia. This is most likely due to chronic disease. We will carry out anemia workup including checking stool guaiac to rule out occult GI bleed. 9.  Bilateral pleural effusion. The patient recently had fluid drained from his chest.  We will await the result of CT scan of the chest for further evaluation of the pleural effusion. Depending on the CT scan result, the patient may require Pulmonary consult. 10.  Acute CHF. The patient presented with elevated BNP level. Chest x-ray shows vascular congestion. We will obtain echocardiogram to determine the ejection fraction and the type of congestive heart failure. We will start the patient on Lasix. Cardiology consult will be requested to assist in further evaluation and treatment. 11.  Hyperglycemia. We will check hemoglobin A1c level. It is not clear whether the patient has diabetes or not prior to admission. 12.  Sepsis. Code sepsis order was placed by the emergency room physician when the patient arrived at the emergency room. We will start the patient on vancomycin and Zosyn. We will await urine culture results. We will also await blood culture result. We will await the result of CT scan of the abdomen and pelvis. We will also check lipase level. 13.  Other Issues:  Code Status: The patient is a full code. The patient is on Eliquis, because of that, there is no need for DVT prophylaxis. Consider resuming Eliquis in the morning if the patient has no active bleeding from the scalp laceration.         MD SUE Louis/[email or web address]  D:  12/21/2021 2:38  T:  12/21/2021 4: [address] #:  [id number]  CC:  Laisha Castro

## 2021-12-21 NOTE — NURSE NAVIGATOR
Chart reviewed by Heart Failure Nurse Navigator. Heart Failure database completed. Patient admitted after [facility] for head laceration. Noted to be hypertensive with hypoxia. Patient admitted with acute respiratory failure with suspected bacterial pneumonia and bilateral pleural effusions with possible component of HF. EF:  Echo is pending. ACEi/ARB/ARNi:     BB: **    Aldosterone Antagonist: **    Obstructive Sleep Apnea Screening:   STOP-BANG score:   Referred to Sleep Medicine:     CRT **. NYHA Functional Class **. Heart Failure Teach Back in Patient Education. Heart Failure Avoiding Triggers on Discharge Instructions. Cardiologist: Dr Kathy Farmer discharge follow up phone call to be made within 48-72 hours of discharge.

## 2021-12-21 NOTE — PROGRESS NOTES
Problem: Falls - Risk of  Goal: *Absence of Falls  Description: Document [place] Fall Risk and appropriate interventions in the flowsheet.   Outcome: Progressing Towards Goal  Note: Fall Risk Interventions:            Medication Interventions: Bed/chair exit alarm,Assess postural VS orthostatic hypotension    Elimination Interventions: Call light in reach,Bed/chair exit alarm,Patient to call for help with toileting needs    History of Falls Interventions: Bed/chair exit alarm,Consult care management for discharge planning,Door open when patient unattended,Investigate reason for fall

## 2021-12-21 NOTE — ED NOTES
Bedside and Verbal shift change report given to GIANNA Tellez by Loren Au. Report included the following information SBAR and ED Summary.

## 2021-12-21 NOTE — PROGRESS NOTES
[facility]  [address] [facility], [place], [address] [place] [address]  [phone]      Medical Progress Note      NAME: Robby Fraser   :  1935  MRM:  [id number]    Date of service: 2021  10:39 AM       Assessment and Plan:   1.  Acute respiratory failure with hypoxia most likely due to BL pleural effusion/ CHF exacerbation. Has recently thoracocentesis. CTA of the chest is negative for PE, but shows BL pleural effusion and atelectasis. Troponin is negative. Rapid covid test is negative. Doubt bacterial pneumonia. stop ABx. Consult pulmonary      2. Acute CHF/ elevated proBNP.  unknown EF. Chest x-ray shows vascular congestion.  check echocardiogram. Continue diuretic. Check I/O. Cardiology consult appreciated      3. Persistent atrial fibrillation. Hold eliquis in case thoracocentesis needed.      4.  Fall/ Scalp laceration.  CT scan of the head is negative for acute pathology.  CT scan of the cervical spine is also negative. Fall precaution      5.  Parkinson's disease.  continue sinemet     6.  Anemia.  This is most likely due to chronic disease. Monitor      7.  Hyperglycemia. check hemoglobin A1c level.            Subjective:     Chief Complaint[de-identified] Patient was seen and examined as a follow up for CHF excaerbation. Chart was reviewed. ROS:  (bold if positive, if negative)    Tolerating PT  Tolerating Diet        Objective:     Last 24hrs VS reviewed since prior progress note.  Most recent are:    Visit Vitals  BP (!) 164/81   Pulse 96   Temp 97.7 °F (36.5 °C)   Resp 30   Ht 5' 11.3\" (1.811 m)   Wt 96.3 kg (212 lb 4.9 oz)   SpO2 95%   BMI 29.36 kg/m²     SpO2 Readings from Last 6 Encounters:   21 95%    O2 Flow Rate (L/min): 4 l/min       Intake/Output Summary (Last 24 hours) at 2021 1039  Last data filed at 2021 0142  Gross per 24 hour   Intake 250 ml   Output    Net 250 ml        Physical Exam:    Gen:  Well-developed, well-nourished, in no acute distress  HEENT:  Pink conjunctivae, PERRL, hearing intact to voice, moist mucous membranes  Neck:  Supple, without masses, thyroid non-tender  Resp:  No accessory muscle use, clear breath sounds without wheezes rales or rhonchi  Card:  No murmurs, normal S1, S2 without thrills, bruits. +r peripheral edema  Abd:  Soft, non-tender, non-distended, normoactive bowel sounds are present, no palpable organomegaly and no detectable hernias  Lymph:  No cervical or inguinal adenopathy  Musc:  No cyanosis or clubbing  Skin:  No rashes or ulcers, skin turgor is good  Neuro:  Cranial nerves are grossly intact, no focal motor weakness, follows commands appropriately  Psych:  Good insight, oriented to person, place and time, alert  __________________________________________________________________  Medications Reviewed: (see below)  Medications:     Current Facility-Administered Medications   Medication Dose Route Frequency    carbidopa-levodopa (SINEMET)  mg per tablet 1.5 Tablet  1.5 Tablet Oral QID    midodrine (PROAMATINE) tablet 2.5 mg  2.5 mg Oral TID    sodium chloride (NS) flush 5-40 mL  5-40 mL IntraVENous Q8H    sodium chloride (NS) flush 5-40 mL  5-40 mL IntraVENous PRN    acetaminophen (TYLENOL) tablet 650 mg  650 mg Oral Q6H PRN    Or    acetaminophen (TYLENOL) suppository 650 mg  650 mg Rectal Q6H PRN    polyethylene glycol (MIRALAX) packet 17 g  17 g Oral DAILY PRN    ondansetron (ZOFRAN ODT) tablet 4 mg  4 mg Oral Q8H PRN    Or    ondansetron (ZOFRAN) injection 4 mg  4 mg IntraVENous Q6H PRN    L.acidophilus-paracasei-S.thermophil-bifidobacter (RISAQUAD) 8 billion cell capsule  1 Capsule Oral DAILY    furosemide (LASIX) injection 40 mg  40 mg IntraVENous Q12H    sodium chloride (NS) flush 5-10 mL  5-10 mL IntraVENous PRN     Current Outpatient Medications   Medication Sig    apixaban (Eliquis) 5 mg tablet Take 5 mg by mouth two (2) times a day.     midodrine (PROAMATINE) 2.5 mg tablet Take 2.5 mg by mouth three (3) times daily.  carbidopa-levodopa (SINEMET)  mg per tablet Take 1.5 Tablets by mouth four (4) times daily. Lab Data Reviewed: (see below)  Lab Review:     Recent Labs     12/21/21 0412 12/20/21 2037   WBC 8.2 9.4   HGB 8.7* 9.7*   HCT 27.9* 31.5*    299     Recent Labs     12/21/21 0412 12/20/21 2037    141   K 4.3 4.1   * 107   CO2 28 28   * 183*   BUN 20 20   CREA 1.24 1.36*   CA 8.4* 8.5   MG 2.3  --    PHOS 3.7  --    ALB 2.3* 2.7*   TBILI 1.0 0.8   ALT 6* <6*     No results found for: GLUCPOC  No results for input(s): PH, PCO2, PO2, HCO3, FIO2 in the last 72 hours. No results for input(s): INR, INREXT in the last 72 hours. All Micro Results     Procedure Component Value Units Date/Time    CULTURE, MRSA [[id number]] Collected: 12/21/21 0512    Order Status: Completed Specimen: Nasal Updated: 12/21/21 0804    CULTURE, BLOOD [[id number]] Collected: 12/20/21 2037    Order Status: Completed Specimen: Blood Updated: 12/21/21 0521     Special Requests: NO SPECIAL REQUESTS        Culture result: NO GROWTH AFTER 6 HOURS       CULTURE, BLOOD [[id number]] Collected: 12/20/21 2037    Order Status: Completed Specimen: Blood Updated: 12/21/21 0521     Special Requests: NO SPECIAL REQUESTS        Culture result: NO GROWTH AFTER 6 HOURS       COVID-19 RAPID TEST [[id number]] Collected: 12/21/21 0412    Order Status: Completed Specimen: Nasopharyngeal Updated: 12/21/21 0458     Specimen source Nasopharyngeal        COVID-19 rapid test Not detected        Comment: Rapid Abbott ID Now       Rapid NAAT:  The specimen is NEGATIVE for SARS-CoV-2, the novel coronavirus associated with COVID-19. Negative results should be treated as presumptive and, if inconsistent with clinical signs and symptoms or necessary for patient management, should be tested with an alternative molecular assay.   Negative results do not preclude SARS-CoV-2 infection and should not be used as the sole basis for patient management decisions. This test has been authorized by the FDA under an Emergency Use Authorization (EUA) for use by authorized laboratories. Fact sheet for Healthcare Providers: ConventionUpdate.co.nz  Fact sheet for Patients: ConventionUpdate.co.nz       Methodology: Isothermal Nucleic Acid Amplification               I have reviewed notes of prior 24hr. Other pertinent lab: Total time spent with patient: 28 I personally reviewed chart, notes, data and current medications in the medical record. I have personally examined and treated the patient at bedside during this period.                  Care Plan discussed with: Patient, Nursing Staff and >50% of time spent in counseling and coordination of care    Discussed:  Care Plan    Prophylaxis:  SCD's    Disposition:  Home w/Family           ___________________________________________________    Attending Physician: Anjana Gilliam MD

## 2021-12-21 NOTE — PROGRESS NOTES
TRANSFER - OUT REPORT:    Verbal report given to Stephanie Gupta RN (name) on Aleksandra Pennington  being transferred to [facility] (unit) for routine progression of care       Report consisted of patients Situation, Background, Assessment and   Recommendations(SBAR). Information from the following report(s) SBAR, ED Summary and Cardiac Rhythm A-Fib was reviewed with the receiving nurse. Lines:   Peripheral IV 12/20/21 Left Forearm (Active)   Site Assessment Clean, dry, & intact 12/21/21 1622   Phlebitis Assessment 0 12/21/21 1622   Infiltration Assessment 0 12/21/21 1622   Dressing Status Clean, dry, & intact 12/21/21 1622   Dressing Type Transparent;Tape 12/21/21 1622   Hub Color/Line Status Capped;Flushed 12/21/21 1622   Action Taken Open ports on tubing capped 12/21/21 1622   Alcohol Cap Used Yes 12/21/21 1622       Peripheral IV 12/20/21 Anterior;Right Forearm (Active)   Site Assessment Clean, dry, & intact 12/21/21 1622   Phlebitis Assessment 0 12/21/21 1622   Infiltration Assessment 0 12/21/21 1622   Dressing Status Clean, dry, & intact 12/21/21 1622   Dressing Type Transparent;Tape 12/21/21 1622   Hub Color/Line Status End cap changed; Flushed 12/21/21 1622   Action Taken Open ports on tubing capped 12/21/21 1622   Alcohol Cap Used Yes 12/21/21 1622        Opportunity for questions and clarification was provided.       Patient transported with:   O2 @ 4 liters

## 2021-12-21 NOTE — PROGRESS NOTES
12/21/2021  1:13 PM  Case management note    Reason for Admission:  Acute respiratory failure    Patient came to hospital for GLF with head lac. Patient is  and having a lot of difficulty walking per wife. Patient has history of afib in Eliquis and Parkinson. Patient had been using [facility] but they have only come once. I dont think they have nursing so they may need different HH. Patient has a walker, gave wife list of personal care agencies. RUR Score:       8%              Plan for utilizing home health:      PT/OT to eval    PCP: First and Last name:  Lawrence Bourne MD     Name of Practice:    Are you a current patient: Yes/No:   yes   Approximate date of last visit: 2 months   Can you participate in a virtual visit with your PCP:                     Current Advanced Directive/Advance Care Plan: Full Code NO AD      Healthcare Decision Maker:   Sarah Sadler spouse                              Transition of Care Plan:                    1. Home with family assistance  2. PCP follow up  3. AD planning  4. CM to follow for discharge needs    Care Management Interventions  PCP Verified by CM:  Yes  Support Systems: Spouse/Significant Other  Confirm Follow Up Transport: Family  The Plan for Transition of Care is Related to the Following Treatment Goals : acute respiratory failure  Discharge Location  Discharge Placement: Home with family assistance  Carol Drake

## 2021-12-21 NOTE — PROGRESS NOTES
TRANSFER - IN REPORT:    Verbal report received from GIANNA DORSEY(name) on Vic Radha  being received from ED 20(unit) for routine progression of care      Report consisted of patients Situation, Background, Assessment and   Recommendations(SBAR). Information from the following report(s) SBAR, Kardex, ED Summary, Intake/Output, MAR, Accordion and Recent Results was reviewed with the receiving nurse. Opportunity for questions and clarification was provided. Assessment completed upon patients arrival to unit and care assumed. Bedside and Verbal shift change report given to GIANNA FOSTER (oncoming nurse) by Debbie Adames RN (offgoing nurse). Report included the following information SBAR, Kardex, Intake/Output, MAR, Accordion and Recent Results.

## 2021-12-21 NOTE — ED TRIAGE NOTES
Pt. Maggie Maza in by EMS for GLF, head injury laceration to the back of head, denies LOC, pt. /109, , pt. Was 70% on RA placed on non-rebreather. Pt. Takes eliquis. Pt. Taken straight to CT with EMS. Pt. Alert to situation and self.

## 2021-12-21 NOTE — PROGRESS NOTES
[address] [address] [facility] Progress Note: Antimicrobial Stewardship    Consult for antibiotic dosing of Vancomycin by Dr. Franck Fry  Indication: sepsis  Day of Therapy: 1    Plan:  Vancomycin therapy:  1. Start with loading dose of vancomycin 2250mg IV (25 mg/kg, max 2.5 gm)  2. Follow with maintenance dose of vancomycin : 1250 mg IV q24H  3. Dose calculated to approximate a   a. Target AUC/YOBANI of 400-600  b. Trough of : n/a    4. Plan:     Pharmacy to follow daily and will make changes to dose and/or frequency based on clinical status. Date Dose & Interval Measured (mcg/mL) Extrapolated (mcg/mL)   ? ? ? ?   ? ? ? ?   ? ? ? ? Non-Kinetic Antimicrobial Dosing:   Current Regimen:    Recommendation:   Dose administration notes: Other Antimicrobial  (not dosed by pharmacist)   Zosyn/doxycycline   Cultures        Serum Creatinine     Lab Results   Component Value Date/Time    Creatinine 1.36 (H) 12/20/2021 08:37 PM       Creatinine Clearance Estimated Creatinine Clearance: 47.2 mL/min (A) (based on SCr of 1.36 mg/dL (H)). Procalcitonin  No results found for: PCT     Temp   97.4 °F (36.3 °C)    WBC   Lab Results   Component Value Date/Time    WBC 8.2 12/21/2021 04:12 AM       For Antifungals, Metronidazole and Nafcillin: Lab Results   Component Value Date/Time    ALT (SGPT) <6 (L) 12/20/2021 08:37 PM    AST (SGOT) 10 (L) 12/20/2021 08:37 PM    Alk.  phosphatase 79 12/20/2021 08:37 PM    Bilirubin, total 0.8 12/20/2021 08:37 PM         Pharmacist: Signed Leonidas Saab

## 2021-12-22 NOTE — PROGRESS NOTES
Navid Perdomo [facility]  [address] [facility], [address] [place] [address]  [phone]      Medical Progress Note      NAME: Makayla Norman   :  1935  MRM:  [id number]    Date of service: 2021  10:39 AM       Assessment and Plan:   1.  Acute respiratory failure with hypoxia most likely due to BL pleural effusion/ CHF exacerbation. Has recently thoracocentesis. CTA of the chest is negative for PE, but shows BL pleural effusion and atelectasis. Troponin is negative. Rapid covid test is negative. Doubt bacterial pneumonia. Evaluated by pulmonary      2. Acute diastolic CHF/ elevated proBNP. EF: 65%. Chest x-ray shows vascular congestion. Continue diuretic. Check I/O. Cardiology consult appreciated      3. Persistent atrial fibrillation. Hold eliquis in case thoracocentesis needed.      4.  Fall/ Scalp laceration.  CT scan of the head is negative for acute pathology.  CT scan of the cervical spine is also negative. Fall precaution      5.  Parkinson's disease.  continue sinemet     6.  Anemia.  This is most likely due to chronic disease. Monitor      7.  Hyperglycemia. check hemoglobin A1c level.            Subjective:     Chief Complaint[de-identified] Patient was seen and examined as a follow up for CHF excaerbation. Chart was reviewed. no events    ROS:  (bold if positive, if negative)    Tolerating PT  Tolerating Diet        Objective:     Last 24hrs VS reviewed since prior progress note.  Most recent are:    Visit Vitals  BP (!) 146/74 (BP 1 Location: Right upper arm, BP Patient Position: At rest)   Pulse 88   Temp 98.1 °F (36.7 °C)   Resp 18   Ht 5' 11.3\" (1.811 m)   Wt 79.5 kg (175 lb 3.2 oz)   SpO2 99%   BMI 24.23 kg/m²     SpO2 Readings from Last 6 Encounters:   21 99%    O2 Flow Rate (L/min): 4 l/min       Intake/Output Summary (Last 24 hours) at 2021 0812  Last data filed at 2021 0427  Gross per 24 hour   Intake 300 ml   Output 1650 ml   Net -1350 ml        Physical Exam:    Gen:  Well-developed, well-nourished, in no acute distress  HEENT:  Pink conjunctivae, PERRL, hearing intact to voice, moist mucous membranes  Neck:  Supple, without masses, thyroid non-tender  Resp:  No accessory muscle use, clear breath sounds without wheezes rales or rhonchi  Card:  No murmurs, normal S1, S2 without thrills, bruits.  +r peripheral edema  Abd:  Soft, non-tender, non-distended, normoactive bowel sounds are present, no palpable organomegaly and no detectable hernias  Lymph:  No cervical or inguinal adenopathy  Musc:  No cyanosis or clubbing  Skin:  No rashes or ulcers, skin turgor is good  Neuro:  Cranial nerves are grossly intact, no focal motor weakness, follows commands appropriately  Psych:  Good insight, oriented to person, place and time, alert  __________________________________________________________________  Medications Reviewed: (see below)  Medications:     Current Facility-Administered Medications   Medication Dose Route Frequency    carbidopa-levodopa (SINEMET)  mg per tablet 1.5 Tablet  1.5 Tablet Oral QID    midodrine (PROAMATINE) tablet 2.5 mg  2.5 mg Oral TID    sodium chloride (NS) flush 5-40 mL  5-40 mL IntraVENous Q8H    sodium chloride (NS) flush 5-40 mL  5-40 mL IntraVENous PRN    acetaminophen (TYLENOL) tablet 650 mg  650 mg Oral Q6H PRN    Or    acetaminophen (TYLENOL) suppository 650 mg  650 mg Rectal Q6H PRN    polyethylene glycol (MIRALAX) packet 17 g  17 g Oral DAILY PRN    ondansetron (ZOFRAN ODT) tablet 4 mg  4 mg Oral Q8H PRN    Or    ondansetron (ZOFRAN) injection 4 mg  4 mg IntraVENous Q6H PRN    L.acidophilus-paracasei-S.thermophil-bifidobacter (RISAQUAD) 8 billion cell capsule  1 Capsule Oral DAILY    furosemide (LASIX) injection 40 mg  40 mg IntraVENous Q12H    sodium chloride (NS) flush 5-10 mL  5-10 mL IntraVENous PRN        Lab Data Reviewed: (see below)  Lab Review:     Recent Labs     12/22/21  0402 12/21/21  0412 12/20/21 2037 WBC 8.6 8.2 9.4   HGB 9.0* 8.7* 9.7*   HCT 29.3* 27.9* 31.5*    244 299     Recent Labs     12/22/21 0402 12/21/21 0412 12/20/21 2037    142 141   K 3.9 4.3 4.1    110* 107   CO2 33* 28 28   * 125* 183*   BUN 22* 20 20   CREA 1.42* 1.24 1.36*   CA 9.0 8.4* 8.5   MG  --  2.3  --    PHOS  --  3.7  --    ALB  --  2.3* 2.7*   TBILI  --  1.0 0.8   ALT  --  6* <6*     No results found for: GLUCPOC  No results for input(s): PH, PCO2, PO2, HCO3, FIO2 in the last 72 hours. No results for input(s): INR, INREXT, INREXT in the last 72 hours. All Micro Results     Procedure Component Value Units Date/Time    CULTURE, BLOOD [[id number]] Collected: 12/20/21 2037    Order Status: Completed Specimen: Blood Updated: 12/22/21 0351     Special Requests: NO SPECIAL REQUESTS        Culture result: NO GROWTH 2 DAYS       CULTURE, BLOOD [[id number]] Collected: 12/20/21 2037    Order Status: Completed Specimen: Blood Updated: 12/22/21 0351     Special Requests: NO SPECIAL REQUESTS        Culture result: NO GROWTH 2 DAYS       CULTURE, MRSA [[id number]] Collected: 12/21/21 0512    Order Status: Completed Specimen: Nasal Updated: 12/21/21 0804    COVID-19 RAPID TEST [[id number]] Collected: 12/21/21 0412    Order Status: Completed Specimen: Nasopharyngeal Updated: 12/21/21 0458     Specimen source Nasopharyngeal        COVID-19 rapid test Not detected        Comment: Rapid Abbott ID Now       Rapid NAAT:  The specimen is NEGATIVE for SARS-CoV-2, the novel coronavirus associated with COVID-19. Negative results should be treated as presumptive and, if inconsistent with clinical signs and symptoms or necessary for patient management, should be tested with an alternative molecular assay. Negative results do not preclude SARS-CoV-2 infection and should not be used as the sole basis for patient management decisions.        This test has been authorized by the FDA under an Emergency Use Authorization (EUA) for use by authorized laboratories. Fact sheet for Healthcare Providers: ConventionUpdate.co.nz  Fact sheet for Patients: ConventionUpdate.co.nz       Methodology: Isothermal Nucleic Acid Amplification               I have reviewed notes of prior 24hr. Other pertinent lab: Total time spent with patient: 28 I personally reviewed chart, notes, data and current medications in the medical record. I have personally examined and treated the patient at bedside during this period.                  Care Plan discussed with: Patient, Nursing Staff and >50% of time spent in counseling and coordination of care    Discussed:  Care Plan    Prophylaxis:  SCD's    Disposition:  Home w/Family           ___________________________________________________    Attending Physician: Marcelina Castillo MD

## 2021-12-22 NOTE — PROGRESS NOTES
Patient's wife was called to notify of transfer to room 331. Wife will come back tomorrow morning. Patient notified.

## 2021-12-22 NOTE — PROGRESS NOTES
[facility]     Name: Mario Wolff MRN: [id number]   : 1935 Hospital: [address]   Date: 2021        Impression Plan   1. Acute hypoxemic respiratory failure  2. Bilateral pleural effusions  3. Diastolic CHF  4. LE edema  5. S/p GLF and head trauma               · Patient has been recently tapped on both left and right side. Underlying etiology CHF and pleural effusions will keep reaccumulating if remains fluid overloaded. · Continue with diuresis as BP and Cr allows- follow closely  · Wean O2 as tolerated, keeping O2 sat above 90%. · Hold eliquis in case thoracentesis warrented in the next couple of days. Eliquis held due to head trauma as well. · Repeat CXR tomorrow morning   · PT/OT             Radiology  (personally reviewed) CT chest reviewed: bilateral moderate sized pleural effusions. No infiltrates       Subjective     Cc: pleural effusions    82 yo with PMHx of CHF and chronic pleural effusions presenting after GLF and hitting his head. Pt found to be hypoxic, 84% on RA. As part of workup pt had CT chest done that showed moderate pleural effusions. Pt sees PAR and has had thoracentesis on both sides done (Dec 1st and Dec 9th) showing transudative fluid. Negative cultures and path. Pt has chronic leg edema, however wife states that he is not on diuretics due to low BP at home. BP in the 150s-180s during this admission. Wife reports low garde fevers of 100.6 at home. Denies N/V. Interval history  Afebrile  BP stable  Sats 99% on 4L  Creat 1.42  Blood cx no growth x 2 days  Rapid COVID neg  ECHO: EF 95-14; + diastolic dysfunction  6117 ml UOP + 1 unmeasurable output    Review of Systems: Feeling better with O2 in place. Denies SOB currently at rest.  Denies fever, chills, or cough. Denies CP. Denies abd pain or LE pain/swelling. Reports increased UOP with diuretics.       A comprehensive review of systems was negative except for that written in the HPI.    Past Medical History:   Diagnosis Date    Congestive heart failure (CHF) ([address]     has had to have lungs drained.  Falls     Orthostatic hypotension     Stroke [facility])     right sided affected      Past Surgical History:   Procedure Laterality Date    HX CHOLECYSTECTOMY      HX KNEE ARTHROSCOPY Left       Prior to Admission medications    Medication Sig Start Date End Date Taking? Authorizing Provider   apixaban (Eliquis) 5 mg tablet Take 5 mg by mouth two (2) times a day. Yes Other, MD Parish   midodrine (PROAMATINE) 2.5 mg tablet Take 2.5 mg by mouth three (3) times daily. Yes Other, MD Parish   carbidopa-levodopa (SINEMET)  mg per tablet Take 1.5 Tablets by mouth four (4) times daily. Yes Other, MD Parish     Current Facility-Administered Medications   Medication Dose Route Frequency    carbidopa-levodopa (SINEMET)  mg per tablet 1.5 Tablet  1.5 Tablet Oral QID    midodrine (PROAMATINE) tablet 2.5 mg  2.5 mg Oral TID    sodium chloride (NS) flush 5-40 mL  5-40 mL IntraVENous Q8H    L.acidophilus-paracasei-S.thermophil-bifidobacter (RISAQUAD) 8 billion cell capsule  1 Capsule Oral DAILY    furosemide (LASIX) injection 40 mg  40 mg IntraVENous Q12H     No Known Allergies   Social History     Tobacco Use    Smoking status: Not on file    Smokeless tobacco: Not on file   Substance Use Topics    Alcohol use: Not on file      History reviewed. No pertinent family history. Laboratory: I have personally reviewed flowsheet and labs.      Recent Labs     12/22/21  0402 12/21/21 0412 12/20/21 2037   WBC 8.6 8.2 9.4   HGB 9.0* 8.7* 9.7*   HCT 29.3* 27.9* 31.5*    244 299     Recent Labs     12/22/21  0402 12/21/21 0412 12/20/21 2037    142 141   K 3.9 4.3 4.1    110* 107   CO2 33* 28 28   * 125* 183*   BUN 22* 20 20   CREA 1.42* 1.24 1.36*   CA 9.0 8.4* 8.5   MG  --  2.3  --    PHOS  --  3.7  --    ALB  --  2.3* 2.7*   ALT  --  6* <6*       Objective: Visit Vitals  BP (!) 146/74 (BP 1 Location: Right upper arm, BP Patient Position: At rest)   Pulse 88   Temp 98.1 °F (36.7 °C)   Resp 18   Ht 5' 11.3\" (1.811 m)   Wt 79.5 kg (175 lb 3.2 oz)   SpO2 99%   BMI 24.23 kg/m²         Intake/Output Summary (Last 24 hours) at 12/22/2021 1058  Last data filed at 12/22/2021 4806  Gross per 24 hour   Intake 300 ml   Output 1650 ml   Net -1350 ml     EXAM:   GENERAL: elderly, awake, alert, HEENT:  anicteric, EOMI, no alar flaring or epistaxis, oral mucosa moist without cyanosis, NECK:  no jugular vein distention, no retractions, no thyromegaly or masses, LUNGS: diminished breathsounds in bases , HEART:  Regular rate and rhythm with no MGR; +2 edema is present LE, ABDOMEN:  soft with no tenderness, bowel sounds present, EXTREMITIES:  warm with no cyanosis, SKIN:  no jaundice or ecchymosis and NEUROLOGIC:  Follows commands, responds to questions, moving extremities    ERIKA Rios  [facility]

## 2021-12-22 NOTE — PROGRESS NOTES
Fabian Poole MD, [address] [address]  Denise Gonzalez 33  [phone]      IMPRESSION and RECOMMENDATIONS     1. Volume overload: Manifest as LE edema, recurrent pleural effusion, and poss small pericardial effusion. Echo unremarkable. As usual, echo shows trivial pericardial effusion (when small effusion noted on CT). Continue midodrine despite elevated BP as this may allow more diuresis. May need low-dose diuretic at [facility]". Edema seems resolved.     2.  AF:  Chronic. Intrinsically rate-controlled. Holding eliquis due to fall; would resume when feasible.     Discussed with Pt and wife. Subjective:       No complaints. Objective:   Patient Vitals for the past 16 hrs:   BP Temp Pulse Resp SpO2 Weight   12/22/21 0755   88      12/22/21 0747 (!) 146/74 98.1 °F (36.7 °C) 91 18 99 %    12/22/21 0425 (!) 151/77 98.2 °F (36.8 °C) 96 18 95 %    12/22/21 0400   99   79.5 kg (175 lb 3.2 oz)   12/22/21 0000   99      12/21/21 2325   100      12/21/21 2316 (!) 141/69 97.4 °F (36.3 °C) 92 17 97 %    12/21/21 2131   98      12/21/21 2120 (!) 149/74 97.9 °F (36.6 °C) 91 16 95 % 81.6 kg (180 lb)   12/21/21 2100 125/60 98.2 °F (36.8 °C) 92 16 97 %    12/21/21 2000   (!) 101      12/21/21 1959 133/69  85  97 %        HEENT Exam:     WNL         Lung Exam:     The patient is not dyspneic. Decreased BS at bases. There are no wheezes, rales, rhonchi, or rubs heard on auscultation. Heart Exam:     The rhythm is irregularly irregular. The PMI is in the 5th intercostal space of the MCL. Apical impulse is normal. S1 is regular. S2 is physiologic. There is no S3, S4 gallop, murmur, click, or rub. Abdomen Exam:     Benign. Extremities Exam:     No cyanosis, clubbing, edema. Distal pulses intact.            Lab Results   Component Value Date/Time    Glucose 126 (H) 12/22/2021 04:02 AM    Sodium 141 12/22/2021 04:02 AM Potassium 3.9 12/22/2021 04:02 AM    Chloride 102 12/22/2021 04:02 AM    CO2 33 (H) 12/22/2021 04:02 AM    BUN 22 (H) 12/22/2021 04:02 AM    Creatinine 1.42 (H) 12/22/2021 04:02 AM    Calcium 9.0 12/22/2021 04:02 AM     Recent Labs     12/22/21  0402 12/21/21  0412   WBC 8.6 8.2   HGB 9.0* 8.7*   HCT 29.3* 27.9*    244     Recent Labs     12/21/21  0412 12/20/21 2037   ALT 6* <6*   AP 72 79   TBILI 1.0 0.8   TP 6.9 7.6   ALB 2.3* 2.7*   GLOB 4.6* 4.9*     No results for input(s): INR, PTP, APTT, INREXT in the last 72 hours. No results for input(s): CPK, CKMB, TNIPOC in the last 72 hours. No lab exists for component: TROPONINI, ITNL  No results for input(s): TROIQ in the last 72 hours.   No results found for: CHOL, CHOLX, CHLST, CHOLV, HDL, HDLP, LDL, LDLC, DLDLP, TGLX, TRIGL, TRIGP, CHHD, CHHDX

## 2021-12-22 NOTE — PROGRESS NOTES
Problem: Self Care Deficits Care Plan (Adult)  Goal: *Acute Goals and Plan of Care (Insert Text)  Description: FUNCTIONAL STATUS PRIOR TO ADMISSION: Patient was using a rolling walker for functional mobility. HOME SUPPORT PRIOR TO ADMISSION: The patient lived with spouse but did not require assist.    Occupational Therapy Goals  Initiated 12/22/2021  1. Patient will perform lower body dressing with moderate assistance  within 7 day(s). 2.  Patient will perform grooming with supervision/set-up within 7 day(s). 3.  Patient will perform toilet transfers, to [facility], with minimal assistance/contact guard assist within 7 day(s). 4.  Patient will perform all aspects of toileting with moderate assistance  within 7 day(s). 5.  Patient will participate in upper extremity therapeutic exercise/activities with supervision/set-up for 5 minutes within 7 day(s). Outcome: Progressing Towards Goal   OCCUPATIONAL THERAPY EVALUATION  Patient: Kiko Leong (66 y.o. male)  Date: 12/22/2021  Primary Diagnosis: Acute respiratory failure with hypoxia ([facility] [address] [J96.01]        Precautions:   Fall    ASSESSMENT  Based on the objective data described below, the patient presents with hospital admission secondary to acute respiratory failure with hypoxia. Patient with history of Parkinson's Disease, and AFib. Patient suffered GLF at home resulting in head laceration. Patient scans negative for acute injury. Patient received in bed, on bedpan. He reports no success and agreeable to attempt for [facility] use. With attempt to remove bedpan patient requesting replacement of pan for urgent bowel movement. Patient requires total assist for hygiene. Protective brief placed. Patient to EOB with mod assistance. He is able to maintain balance in sitting but requires high guard. Patient BP reading 112/62 at EOB and patient with no complaints. Patient stands with mod assist and able to take a few steps prior to  verbal report of dizziness. Patient with episode of extreme rigidity, and unresponsive in standing. BSC placed for patient to safely sit. Patient continues without response and PT/OT total assist transfer to bed. Patient then able to speak and BP reading 124/67. Patient RN notified. Patient  spouse present in room and reports similar episodes prior. Patient currently requires continued OT services in rehab setting. Recommend SNF. Current Level of Function Impacting Discharge (ADLs/self-care): mod assist , up to total assist for ADLs     Functional Outcome Measure: The patient scored 15/100 on the Barthel Index  outcome measure. Other factors to consider for discharge: lives with spouse     Patient will benefit from skilled therapy intervention to address the above noted impairments. PLAN :  Recommendations and Planned Interventions: self care training, functional mobility training, therapeutic exercise, balance training, therapeutic activities, endurance activities, patient education, home safety training, and family training/education    Frequency/Duration: Patient will be followed by occupational therapy 5 times a week to address goals. Recommendation for discharge: (in order for the patient to meet his/her long term goals)  Therapy up to 5 days/week in SNF setting    This discharge recommendation:  Has not yet been discussed the attending provider and/or case management    IF patient discharges home will need the following DME: TBD       SUBJECTIVE:   Patient stated I can try.     OBJECTIVE DATA SUMMARY:   HISTORY:   Past Medical History:   Diagnosis Date    Congestive heart failure (CHF) ([address]     has had to have lungs drained.     Falls     Orthostatic hypotension     Stroke [facility])     right sided affected     Past Surgical History:   Procedure Laterality Date    HX CHOLECYSTECTOMY      HX KNEE ARTHROSCOPY Left        Expanded or extensive additional review of patient history:     Home Situation  Home Environment: Private residence  [facility]: Spouse/Significant Other  Patient Expects to be Discharged to[de-identified] Unknown  Current DME Used/Available at Home: Walker, rolling  Tub or Shower Type: Shower    Hand dominance: Right    EXAMINATION OF PERFORMANCE DEFICITS:  Cognitive/Behavioral Status:  Neurologic State: Alert  Orientation Level: Oriented X4  Cognition: Follows commands  Perception: Appears intact  Perseveration: No perseveration noted       Skin: intact as seen    Edema: none noted     Hearing:       Vision/Perceptual:                                     Range of Motion:  AROM: Generally decreased, functional  PROM: Generally decreased, functional                      Strength:  Strength: Generally decreased, functional                Coordination:  Coordination: Generally decreased, functional            Tone & Sensation:    Tone: Normal  Sensation: Intact                      Balance:  Sitting: Intact; High guard  Standing: Impaired  Standing - Static: Fair;Constant support  Standing - Dynamic : Fair;Constant support    Functional Mobility and Transfers for ADLs:  Bed Mobility:  Rolling: Moderate assistance  Supine to Sit: Moderate assistance  Sit to Supine: Total assistance (due to non-responsiveness)  Scooting: Maximum assistance (in supine)    Transfers:  Sit to Stand: Moderate assistance  Stand to Sit: Moderate assistance (difficulty initiating, stiff)  Toilet Transfer : Moderate assistance  Assistive Device : Walker, rolling    ADL Assessment:  Feeding: Minimum assistance    Oral Facial Hygiene/Grooming: Minimum assistance    Bathing: Maximum assistance    Upper Body Dressing: Moderate assistance    Lower Body Dressing: Total assistance    Toileting:  Total assistance                ADL Intervention and task modifications:                                          Therapeutic Exercise:     Functional Measure:    Barthel Index:  Bathin  Bladder: 0  Bowels: 5  Groomin  Dressin  Feedin  Mobility: 0  Stairs: 0  Toilet Use: 0  Transfer (Bed to Chair and Back): 10  Total: 15/100      The Barthel ADL Index: Guidelines  1. The index should be used as a record of what a patient does, not as a record of what a patient could do. 2. The main aim is to establish degree of independence from any help, physical or verbal, however minor and for whatever reason. 3. The need for supervision renders the patient not independent. 4. A patient's performance should be established using the best available evidence. Asking the patient, friends/relatives and nurses are the usual sources, but direct observation and common sense are also important. However direct testing is not needed. 5. Usually the patient's performance over the preceding 24-48 hours is important, but occasionally longer periods will be relevant. 6. Middle categories imply that the patient supplies over 50 per cent of the effort. 7. Use of aids to be independent is allowed. Score Interpretation (from [address] [facility] [address])    Independent   60-79 Minimally independent   40-59 Partially dependent   20-39 Very dependent   <20 Totally dependent     -Glenna Rodriguez., Barthel, D.W. (1965). Functional evaluation: the Barthel Index. [address] [facility] ([address] (1997). The Barthel activities of daily living index: self-reporting versus actual performance in the old (> or = 75 years). Journal of [address] [place] [address] 45(7), [facility], J.HUMBERTO, Conrad Draper., Heladio Lopez. (1999). Measuring the change in disability after inpatient rehabilitation; comparison of the responsiveness of the Barthel Index and Functional [place] Measure. Journal of Neurology, Neurosurgery, and Psychiatry, 66(4), 047-505. Eddye IRA Bates.MARIELOS, MARY Cadena, & CRYSTAL CollinsA. (2004) Assessment of post-stroke quality of life in cost-effectiveness studies: The usefulness of the Barthel Index and the EuroQoL-5D.  Quality of Life Research, 13, 374-88         Occupational Therapy Evaluation Charge Determination   History Examination Decision-Making   LOW Complexity : Brief history review  LOW Complexity : 1-3 performance deficits relating to physical, cognitive , or psychosocial skils that result in activity limitations and / or participation restrictions  MEDIUM Complexity : Patient may present with comorbidities that affect occupational performnce. Miniml to moderate modification of tasks or assistance (eg, physical or verbal ) with assesment(s) is necessary to enable patient to complete evaluation       Based on the above components, the patient evaluation is determined to be of the following complexity level: LOW   Pain Rating:  None reported     Activity Tolerance:   Fair and signs and symptoms of orthostatic hypotension    After treatment patient left in no apparent distress:    Supine in bed, Call bell within reach, Caregiver / family present, and unable to set bed alarm-bed malfunction    COMMUNICATION/EDUCATION:   The patients plan of care was discussed with: Physical therapist and Registered nurse. Home safety education was provided and the patient/caregiver indicated understanding., Patient/family have participated as able in goal setting and plan of care. , and Patient/family agree to work toward stated goals and plan of care. This patients plan of care is appropriate for delegation to hospitals.     Thank you for this referral.  Jerrod Garza, OTR/L  Time Calculation: 31 mins

## 2021-12-22 NOTE — PROGRESS NOTES
Problem: Mobility Impaired (Adult and Pediatric)  Goal: *Acute Goals and Plan of Care (Insert Text)  Description: FUNCTIONAL STATUS PRIOR TO ADMISSION: Patient was modified independent using a rolling walker for functional mobility. HOME SUPPORT PRIOR TO ADMISSION: The patient lived with spouse but did not require assist.    Physical Therapy Goals  Initiated 12/21/2021  1. Patient will move from supine to sit and sit to supine , scoot up and down, and roll side to side in bed with independence within 7 day(s). 2.  Patient will transfer from bed to chair and chair to bed with modified independence using the least restrictive device within 7 day(s). 3.  Patient will perform sit to stand with modified independence within 7 day(s). 4.  Patient will ambulate with modified independence for 100 feet with the least restrictive device within 7 day(s). Outcome: Progressing Towards Goal   PHYSICAL THERAPY TREATMENT  Patient: Krissy Rodríguez (22 y.o. male)  Date: 12/22/2021  Diagnosis: Acute respiratory failure with hypoxia ([facility]) [J96.01] Acute respiratory failure with hypoxia [facility])       Precautions: Fall  Chart, physical therapy assessment, plan of care and goals were reviewed. ASSESSMENT  Patient continues with skilled PT services and is slowly progressing towards goals. Patient received on bedpan but receptive to plan to move to [facility] as was having no luck with BM. Patient assisted off bedpan, then began to have BM so it was replaced and patient ultimately cleaned up dependently. Patient requiring MOD A for rolling, coming to sit but demos good balance once feet are on the floor. PCT arrived to take vitals, all stable and BP in 120s/60s. Came to standing with RW and MOD A x 1-2, ambulated 5 feet forward but then patient c/o dizziness. BSC brought for patient to sit on but patient then became unresponsive to further stimuli and questions.   Also became very rigid, required strenuous removal of hands from RW and facilitation of hip flex to sit on BSC. Remained non-responsive on toilet so OT and PT transferred patient back to supine dependently. Once supine, patient immediately became response and BP obtained, 124/67. Wife reports this \"happens a lot\" and happened in rehab before. Patient clearly below his baseline level and currently requires 2 persons for safety with OOB mobility. Wife would be unable to assist at current functional level. Recommend SNF rehab prior to d/c home to maximize independence with functional mobility, minimize fall risk. Current Level of Function Impacting Discharge (mobility/balance): MOD A x 1-2 persons, standing, gait with RW x 5'    Other factors to consider for discharge: wife' ability to assist         PLAN :  Patient continues to benefit from skilled intervention to address the above impairments. Continue treatment per established plan of care. to address goals. Recommendation for discharge: (in order for the patient to meet his/her long term goals)  Therapy up to 5 days/week in SNF setting    This discharge recommendation:  Has been made in collaboration with the attending provider and/or case management    IF patient discharges home will need the following DME: to be determined (TBD)       SUBJECTIVE:   Patient stated I don't know why that happens sometimes (dizzy/unresponsive).     OBJECTIVE DATA SUMMARY:   Critical Behavior:  Neurologic State: Alert  Orientation Level: Oriented X4  Cognition: Follows commands     Functional Mobility Training:  Bed Mobility:  Rolling: Moderate assistance  Supine to Sit: Moderate assistance  Sit to Supine: Total assistance (due to non-responsiveness)  Scooting: Maximum assistance (in supine)        Transfers:  Sit to Stand: Moderate assistance  Stand to Sit: Moderate assistance (difficulty initiating, stiff)                             Balance:  Sitting: Intact; High guard  Standing: Impaired  Standing - Static: Fair;Constant support  Standing - Dynamic : Fair;Constant support  Ambulation/Gait Training:  Distance (ft): 5 Feet (ft)  Assistive Device: Walker, rolling;Gait belt  Ambulation - Level of Assistance: Moderate assistance        Gait Abnormalities: Shuffling gait;Trunk sway increased        Base of Support: Center of gravity altered     Speed/Jessenia: Fluctuations; Slow  Step Length: Right shortened;Left shortened                   Pain Rating:  None reported    Activity Tolerance:   Fair, Poor, desaturates with exertion and requires oxygen, requires rest breaks, and signs and symptoms of orthostatic hypotension    After treatment patient left in no apparent distress:   Supine in bed, Call bell within reach, and Bed / chair alarm activated    COMMUNICATION/COLLABORATION:   The patients plan of care was discussed with: Occupational therapist, Registered nurse, and Case management.      Brianna Rodriguez, PT   Time Calculation: 40 mins

## 2021-12-22 NOTE — PROGRESS NOTES
Patient/family seen: YES       Informed patient/family of BPCI-A Bundle Program. Also advised of potential outreach by Care Transitions Team.    Bundle Payment Care Improvement Beneficiary Letter Delivered to Beneficiary or Representative:YES. Date BCPI -A was given:12/22/21    Patient has received a BPCI (Bundled Payments for Care Improvement) Advanced notification. Patient notified as being on the Cardiac Bundle at [place].     Kanwal Silveira RN, MSN/Care manager  [phone]

## 2021-12-22 NOTE — NURSE NAVIGATOR
Met with patient and wife at bedside. Introduced self and role of HF NN. Patient mostly slept throughout our discussion and education was directed at wife who manages his medical care. Assessed her current understanding of illness and hospitalization. Patient's last hospitalization was at [facility] in March 2021 with stay at [address] [place] [address] rehab prior to discharge. He had gallbladder surgery during this hospitalization. His rehab was complicated by orthostatic hypotension. He was started on midodrine at that time with dosage of 5 mg TID. After discharge, Dr Adelaide Banda, his Cardiologist, decreased the dosage to 2.5 mg TID. He was never on diuretics at home. More recently,several weeks ago,  he began having increased weakness, BOUDREAUX, and a fall. He went to PCP who found the pleural effusion on CXR and send patient to pulmonary specialist.  As an OP, he has had two thoracentesis since that time. He has been sedentary at home for some time and ambulates chair and bathroom only, leaving the house only for MD appointments. He has not noted weight loss, per wife, and has not had much problems with appetite. He declined ONS during last hospitalization. She denies he has any swallowing difficulties. In the past, she had measured orthostatic BP at home but not recently. He has never been asked to weigh daily. Wife states that Dr Fabien Villegas has told her that heart failure is not likely the mechanism for his fluid congestion. Explained to wife basic definition of diastolic HF and that the self care measures I would be sharing would be the same regardless of the cause of symptoms. Reviewed HF symptoms and early recognition of symptoms and notification of provider for diuretic adjustment. She is aware patient will need daily diuretic at home if his BP can tolerate it. We reviewed rationale for daily weight, how to be accurate with daily weight, and weight gain parameters.   She does have a large flat scale and feels when he is at his baseline, they would be able to safely measure a daily weight. Reviewed use of the HF calendar with wife. Discussed the relationship of sodium to fluid congestion and symptoms and reviewed hidden sources of dietary sodium. She was aware of low sodium diet recommendation and has discussed this in the past with his Cardiologist.  Reviewed general guidelines for fluid intake and she notes he requires encouragement to drink enough fluid and does not drink a lot because he says he does not want to have to go to the bathroom a lot. Discussed care transition with wife. She has grown children in the area but all of whom work. She admits he is heavy and difficult for her to care for when he is having a bad day. He does not have an AMD and she mentions they \"should probably do one\". She said his main symptoms are fatigue, weakness, and shortness of breath with exertion. He would likely benefit from a palliative care consult for further discussion around goals of care. Wife was educated on availability and scope of services of [facility]. She was aware of them and had thought about calling for a visit earlier on the day that he fell and ended up admitted.

## 2021-12-22 NOTE — PROGRESS NOTES
Bedside and Verbal shift change report given to [facility] (oncoming nurse) by May (offgoing nurse). Report included the following information SBAR, Procedure Summary, Intake/Output and MAR.

## 2021-12-22 NOTE — PROGRESS NOTES
Order received for home health nursing, PT, OT. Home health follow up discussed with patient/family. Home health provider list given to patient. Choice is [facility] health, referral sent via [employer]. Warren of Choice obtained. Await response. Patient ACCEPTED by AUGUSTIN BAZAN [facility] home health, AVS updated. Care Management Interventions  PCP Verified by CM:  Yes  Transition of Care Consult (CM Consult): [address]: No  Reason Outside [place]:  (pt preference)  Support Systems: Spouse/Significant Other  Confirm Follow Up Transport: Family  The Plan for Transition of Care is Related to the Following Treatment Goals : Home Health  The Patient and/or Patient Representative was Provided with a Choice of Provider and Agrees with the Discharge Plan?: Yes  Name of the Patient Representative Who was Provided with a Choice of Provider and Agrees with the Discharge Plan: spouse Andrew Casillas  Warren of Choice List was Provided with Basic Dialogue that Supports the Patient's Individualized Plan of Care/Goals, Treatment Preferences and Shares the Quality Data Associated with the Providers?: (S) Yes  Discharge Location  Discharge Placement: Home with home health    Dave Ruiz RN, MSN/Care manager

## 2021-12-22 NOTE — PROGRESS NOTES
12/22/2021   1:52 PM  CM spoke w/ pt's spouse Joce Farley to discuss [facility] services, pt currently has [address] rehab but they do not offer SN, se agrees w/ HH and confirmed pt previously had [facility], verbal consent for referral to [facility] in [employer]. Await response  Care Management Interventions  PCP Verified by CM: Yes  Transition of Care Consult (CM Consult): [address]: No  Reason Outside [place]:  (pt preference)  Support Systems: Spouse/Significant Other  Confirm Follow Up Transport: Family  The Plan for Transition of Care is Related to the Following Treatment Goals : Home Health  The Patient and/or Patient Representative was Provided with a Choice of Provider and Agrees with the Discharge Plan?: Yes  Name of the Patient Representative Who was Provided with a Choice of Provider and Agrees with the Discharge Plan: spouse Rogelio Perez  Freedom of Choice List was Provided with Basic Dialogue that Supports the Patient's Individualized Plan of Care/Goals, Treatment Preferences and Shares the Quality Data Associated with the Providers?: (S) Yes  Discharge Location  Discharge Placement: Home with home health  Severiano Monarch, BS        12:21 PM  Update via chart review, pt is continuing to require medical management for Acute respiratory failure with hypoxia most likely due to BL pleural effusion/ CHF exacerbation, Acute diastolic CHF/ elevated proBNP, Persistent atrial fibrillation, Fall, Parkinson's disease, anemia, hyperglycemia  Transitions of Care Plan:  CHF Bundle   RUR 12 %  LOS 2 Days  1. Medical management continues   2. Acute diastolic CHF, Cardiology following   3. Acute respiratory failure with hypoxia, pulmonary following,  pt on 4L O2 NC, follow for DC O2 needs  4. PT, OT following recommending H at DC pt has used [facility] will need nursing, will discuss w/ spouse   5.  DC when stable to home w/ family assistance and HH; Ramesh Hendrix gave spouse list of [facility] aides, pt owns RW   6. Outpatient f/u PCP, cardiology  7. Transport family vs WC van   8.  CM will continue to follow and assist w/ DC needs   MANI Duran

## 2021-12-22 NOTE — PROGRESS NOTES
Bedside shift change report given to Lisa Guevara (oncoming nurse) by May (offgoing nurse). Report included the following information SBAR.     Patient Vitals for the past 12 hrs:   Temp Pulse Resp BP SpO2   12/22/21 0425 98.2 °F (36.8 °C) 96 18 (!) 151/77 95 %   12/22/21 0400  99      12/22/21 0000  99      12/21/21 2325  100      12/21/21 2316 97.4 °F (36.3 °C) 92 17 (!) 141/69 97 %   12/21/21 2131  98      12/21/21 2120 97.9 °F (36.6 °C) 91 16 (!) 149/74 95 %   12/21/21 2100 98.2 °F (36.8 °C) 92 16 125/60 97 %   12/21/21 2000  (!) 101      12/21/21 1959  85  133/69 97 %

## 2021-12-23 NOTE — PROGRESS NOTES
Sarthak Rollins MD, [address] [address]  Denise Gonzalez 34  [phone]      IMPRESSION and RECOMMENDATIONS     1. Volume overload: Manifest as LE edema, recurrent pleural effusion, and poss small pericardial effusion. Echo unremarkable. As usual, echo shows trivial pericardial effusion (when small effusion noted on CT). Continue midodrine despite elevated BP as this may allow more diuresis. May need low-dose diuretic at [facility]". Would suggest 20mg every day. Edema seems resolved.     2.  AF:  Chronic. Intrinsically rate-controlled. Holding eliquis due to fall; would resume when feasible.     No other recs at this time. Will see prn. Discussed with Pt and wife. Subjective:       No complaints. Objective:     Patient Vitals for the past 16 hrs:   BP Temp Pulse Resp SpO2 Weight   12/23/21 0814 125/74 97.5 °F (36.4 °C) 92 18 99 %    12/23/21 0700   81      12/23/21 0333      74 kg (163 lb 2.3 oz)   12/23/21 0322 121/67 98 °F (36.7 °C) 86 20 97 %    12/22/21 2327   89      12/22/21 2321 125/75 97.3 °F (36.3 °C) 79 16 97 %    12/22/21 2040     98 %    12/22/21 1844 122/69 98.3 °F (36.8 °C) 97 16 97 %        HEENT Exam:     WNL         Lung Exam:     The patient is not dyspneic. Decreased BS at bases. There are no wheezes, rales, rhonchi, or rubs heard on auscultation. Heart Exam:     The rhythm is irregularly irregular. The PMI is in the 5th intercostal space of the MCL. Apical impulse is normal. S1 is regular. S2 is physiologic. There is no S3, S4 gallop, murmur, click, or rub. Abdomen Exam:     Benign. Extremities Exam:     No cyanosis, clubbing, edema. Distal pulses intact.            Lab Results   Component Value Date/Time    Glucose 126 (H) 12/23/2021 01:47 AM    Sodium 139 12/23/2021 01:47 AM    Potassium 3.6 12/23/2021 01:47 AM    Chloride 100 12/23/2021 01:47 AM    CO2 35 (H) 12/23/2021 01:47 AM BUN 31 (H) 12/23/2021 01:47 AM    Creatinine 1.64 (H) 12/23/2021 01:47 AM    Calcium 8.7 12/23/2021 01:47 AM     Recent Labs     12/23/21  0147 12/22/21  0402   WBC 7.8 8.6   HGB 9.8* 9.0*   HCT 31.4* 29.3*    285     Recent Labs     12/21/21  0412 12/20/21 2037   ALT 6* <6*   AP 72 79   TBILI 1.0 0.8   TP 6.9 7.6   ALB 2.3* 2.7*   GLOB 4.6* 4.9*     No results for input(s): INR, PTP, APTT, INREXT, INREXT in the last 72 hours. No results for input(s): CPK, CKMB, TNIPOC in the last 72 hours. No lab exists for component: TROPONINI, ITNL  No results for input(s): TROIQ in the last 72 hours.   No results found for: CHOL, CHOLX, CHLST, CHOLV, HDL, HDLP, LDL, LDLC, DLDLP, TGLX, TRIGL, TRIGP, CHHD, CHHDX

## 2021-12-23 NOTE — PROGRESS NOTES
TRANSFER - OUT REPORT:    [facility] (patient name)  being transferred to [address] [place] [address] (unit) for routine progression of care   Report consisted of patients Situation, Background, Assessment and   Recommendations(SBAR).     GIANNA Monahan

## 2021-12-23 NOTE — PROGRESS NOTES
Physician Progress Note      Itzel LAMAR #:                  [id number]  :                       1935  ADMIT DATE:       2021 8:04 PM  DISCH DATE:  RESPONDING  PROVIDER #:        Bynum Harada MD          QUERY TEXT:    Patient admitted with acute dCHF. Documentation reflects suspected sepsis in H&P. If possible, please document in the progress notes and discharge summary if sepsis was: The medical record reflects the following:  Risk Factors: Pna    Clinical Indicators:    WBC 9.4-> 8.2-> 8.6-> 7.8  Lactate 1.1  Procal 1.3  T 101.7  HR 76 - 115  RR 16 - 39  CXR- Lungs/pleura: Bibasilar opacities with obscured hemidiaphragms    H&P-  Bacterial pneumonia. This is most likely superimposed on the suspected COVID-19 virus infection. We will start the patient on doxycycline    Treatment: CXR; Zosyn 3.375g IV; Vanc 2,250mg IV; Levaquin 750mg IV daily    Thank you,  Tiffany Olivarez RN, BSN, [facility], [place], [place]  Clinical Documentation  [phone] or [phone]  Options provided:  -- Sepsis confirmed after study  -- Sepsis treated and resolved  -- Sepsis ruled out after study  -- Other - I will add my own diagnosis  -- Disagree - Not applicable / Not valid  -- Disagree - Clinically unable to determine / Unknown  -- Refer to Clinical Documentation Reviewer    PROVIDER RESPONSE TEXT:    Sepsis ruled out after study.     Query created by: Tommy Dunbar on 2021 9:37 AM      Electronically signed by:  Bynum Harada MD 2021 9:40 AM

## 2021-12-23 NOTE — PROGRESS NOTES
12/23/21    TRANSFER - IN REPORT:    Verbal report received  From Angie Mccloud (name) on Nicole Batres (patient name) being transferred to [address] [place] [address] (unit) for routine progression of care   Report consisted of patients Situation, Background, Assessment and   Recommendations(SBAR). Patient requiring SNF at [facility]- [facility]. [facility] is reviewing, [facility] accepted and CM left a VM with admissions at [facility] to return the call. Transition of Care Plan from Angie Mccloud (name)    RUR 13% (Score %) low   Is This a Readmission NO  Is this a Bundle YES    1. Acute dysatolic CHF, cards following  2. Acute respiratory failure with hypoxia, on 4L O2, pulmonary following  3. PT following; snf recommended (needs 2 person assist & has orthostatic hypotension)  4. Family to transport vs w/c van  5.  CM following for any needs prior to d/c    [facility]

## 2021-12-23 NOTE — PROGRESS NOTES
Problem: Mobility Impaired (Adult and Pediatric)  Goal: *Acute Goals and Plan of Care (Insert Text)  Description: FUNCTIONAL STATUS PRIOR TO ADMISSION: Patient was modified independent using a rolling walker for functional mobility. HOME SUPPORT PRIOR TO ADMISSION: The patient lived with spouse but did not require assist.    Physical Therapy Goals  Initiated 12/21/2021  1. Patient will move from supine to sit and sit to supine , scoot up and down, and roll side to side in bed with independence within 7 day(s). 2.  Patient will transfer from bed to chair and chair to bed with modified independence using the least restrictive device within 7 day(s). 3.  Patient will perform sit to stand with modified independence within 7 day(s). 4.  Patient will ambulate with modified independence for 100 feet with the least restrictive device within 7 day(s). Outcome: Progressing Towards Goal   PHYSICAL THERAPY TREATMENT  Patient: Lrona Reeves (46 y.o. male)  Date: 12/23/2021  Diagnosis: Acute respiratory failure with hypoxia ([facility]) [J96.01] Acute respiratory failure with hypoxia [facility])       Precautions: Fall  Chart, physical therapy assessment, plan of care and goals were reviewed. ASSESSMENT  Patient continues with skilled PT services and is progressing towards goals. Communicated with nurse cleared for therapy but advised to check orthostatic. Patient supine on bed spouse at bedside when received. Rolled on the edge of bed min assist, supine to sit min assist, sit to stand min assist, lateral side steps towards the head of bed min assist. Patient still having unstable BP but asymptomatic only report of feeling some dizziness. Assisted supine on bed and reposition up high on bed and place bed to modified chair position. Activated bed alarm and notified nurse who agreed to monitor patient.   Vitals:    12/23/21 1144 12/23/21 1145 12/23/21 1207 12/23/21 1215   BP: (!) 77/35 (!) 97/54 (!) 80/48    BP 1 Location: Left upper arm    BP Patient Position: Standing Supine Semi fowlers    Pulse:  (!) 101 (!) 102    Temp:    98 °F (36.7 °C)   Resp:   13    Height:       Weight:       SpO2: 90% 100% 96%         Current Level of Function Impacting Discharge (mobility/balance): min assist with transfers and ambulation using a rolling walker    Other factors to consider for discharge: falls , orthostatic         PLAN :  Patient continues to benefit from skilled intervention to address the above impairments. Continue treatment per established plan of care. to address goals. Recommendation for discharge: (in order for the patient to meet his/her long term goals)  Therapy up to 5 days/week in SNF setting    This discharge recommendation:  Has been made in collaboration with the attending provider and/or case management    IF patient discharges home will need the following DME: patient owns DME required for discharge       SUBJECTIVE:   Patient stated [address].     OBJECTIVE DATA SUMMARY:   Critical Behavior:  Neurologic State: Alert  Orientation Level: Oriented to person,Oriented to situation,Disoriented to time,Disoriented to place  Cognition: Follows commands,Poor safety awareness     Functional Mobility Training:  Bed Mobility:  Rolling: Minimum assistance  Supine to Sit: Minimum assistance  Sit to Supine: Minimum assistance  Scooting: Minimum assistance        Transfers:  Sit to Stand: Minimum assistance  Stand to Sit: Minimum assistance  Stand Pivot Transfers: Minimum assistance                          Balance:  Sitting: Intact  Standing: Intact; With support  Standing - Static: Fair  Standing - Dynamic : Fair  Ambulation/Gait Training:  Distance (ft): 1 Feet (ft)  Assistive Device: Walker, rolling;Gait belt  Ambulation - Level of Assistance: Minimal assistance     Gait Description (WDL): Exceptions to WDL  Gait Abnormalities: Path deviations; Step to gait        Base of Support: Narrowed     Speed/Jessenia: Fluctuations;Slow;Shuffled  Step Length: Right shortened;Left shortened                          Therapeutic Exercises:   Educate and instructed patient to continue active range of motion exercise on both legs while up on chair or on bed multiple times. Recommend patient to be up on the chair at least 3 times a day every meal times as tolerated. Pain Ratin/10    Activity Tolerance:   Good    After treatment patient left in no apparent distress:   Supine in bed, Heels elevated for pressure relief, Call bell within reach, Bed / chair alarm activated, Caregiver / family present, and Side rails x 3    COMMUNICATION/COLLABORATION:   The patients plan of care was discussed with: Occupational therapy assistant and Registered nurse. Mele Riley, PT,WCC.    Time Calculation: 24 mins

## 2021-12-23 NOTE — PROGRESS NOTES
Bedside and Verbal shift change report given to GIANNA Benson (oncoming nurse) by Racheal Campbell (offgoing nurse). Report included the following information SBAR, Kardex, ED Summary, MAR, Recent Results and Cardiac Rhythm A Fib. This patient was assisted with Intentional Toileting every 2 hours during this shift as appropriate. Documentation of ambulation and output reflected on Flowsheet as appropriate. Purposeful hourly rounding was completed using AIDET and 5Ps. Outcomes of PHR documented as they occurred. Bed alarm in use as appropriate. Dual Suction and ambubag in place. Bedside and Verbal shift change report given to [address] [place] [address] (oncoming nurse) by Alfonso Kilgore (offgoing nurse). Report included the following information SBAR, Kardex, ED Summary, MAR, Recent Results and Cardiac Rhythm A Fib.

## 2021-12-23 NOTE — PROGRESS NOTES
Bedside and Verbal shift change report given to Marilee Hinds RN (oncoming nurse) by Trinidad Romano RN (offgoing nurse). Report included the following information SBAR, Kardex, Intake/Output, MAR, Accordion and Recent Results. This patient was assisted with Intentional Toileting every 2 hours during this shift as appropriate. Documentation of ambulation and output reflected on Flowsheet as appropriate. Purposeful hourly rounding was completed using AIDET and 5Ps. Outcomes of PHR documented as they occurred. Bed alarm in use as appropriate. Dual Suction and ambubag in place. Bedside and Verbal shift change report given to Karen Hartman RN (oncoming nurse) by Marilee Hinds RN (offgoing nurse). Report included the following information SBAR, Kardex, Intake/Output, MAR, Accordion and Recent Results.

## 2021-12-23 NOTE — PROGRESS NOTES
[facility]     Name: Dillon Huffman MRN: [id number]   : 1935 Hospital: [address]   Date: 2021        Impression Plan   1. Acute hypoxemic respiratory failure  2. Bilateral pleural effusions  3. Diastolic CHF  4. LE edema  5. S/p GLF and head trauma               · Patient has been recently tapped on both left and right side. Underlying etiology CHF and pleural effusions will keep reaccumulating if remains fluid overloaded. · Diuresis per cards, rising BUN/Cr noted  · Wean O2 as tolerated, keeping O2 sat above 90%. · CXRs during this admit stable when compared to CXR post-thoracentesis (in [facility]); would not recommend repeat thoracentesis at this time, as pt is currently breathing comfortably; will discuss with pt's wife  · Resume eliquis when okay with primary team  · PT/OT             Radiology  (personally reviewed) CT chest reviewed: bilateral moderate sized pleural effusions. No infiltrates       Subjective     Cc: pleural effusions    82 yo with PMHx of CHF and chronic pleural effusions presenting after GLF and hitting his head. Pt found to be hypoxic, 84% on RA. As part of workup pt had CT chest done that showed moderate pleural effusions. Pt sees PAR and has had thoracentesis on both sides done (Dec 1st and Dec 9th) showing transudative fluid. Negative cultures and path. Pt has chronic leg edema, however wife states that he is not on diuretics due to low BP at home. BP in the 150s-180s during this admission. Wife reports low garde fevers of 100.6 at home. Denies N/V.    : Afebrile. Currently on 3LNC. States that breathing is ~75% back to baseline. A comprehensive review of systems was negative except for that written in the HPI. Past Medical History:   Diagnosis Date    Congestive heart failure (CHF) ([address]     has had to have lungs drained.     Falls     Orthostatic hypotension     Stroke [facility])     right sided affected      Past Surgical History:   Procedure Laterality Date    HX CHOLECYSTECTOMY      HX KNEE ARTHROSCOPY Left       Prior to Admission medications    Medication Sig Start Date End Date Taking? Authorizing Provider   apixaban (Eliquis) 5 mg tablet Take 5 mg by mouth two (2) times a day. Yes Cedric, MD Parish   midodrine (PROAMATINE) 2.5 mg tablet Take 2.5 mg by mouth three (3) times daily. Yes Cedric, MD Parish   carbidopa-levodopa (SINEMET)  mg per tablet Take 1.5 Tablets by mouth four (4) times daily. Yes Cedric, MD Parish     Current Facility-Administered Medications   Medication Dose Route Frequency    carbidopa-levodopa (SINEMET)  mg per tablet 1.5 Tablet  1.5 Tablet Oral QID    midodrine (PROAMATINE) tablet 2.5 mg  2.5 mg Oral TID    sodium chloride (NS) flush 5-40 mL  5-40 mL IntraVENous Q8H    L.acidophilus-paracasei-S.thermophil-bifidobacter (RISAQUAD) 8 billion cell capsule  1 Capsule Oral DAILY    [Held by provider] furosemide (LASIX) injection 40 mg  40 mg IntraVENous Q12H     No Known Allergies   Social History     Tobacco Use    Smoking status: Not on file    Smokeless tobacco: Not on file   Substance Use Topics    Alcohol use: Not on file      History reviewed. No pertinent family history. Laboratory: I have personally reviewed flowsheet and labs. Recent Labs     12/23/21 0147 12/22/21 0402 12/21/21 0412   WBC 7.8 8.6 8.2   HGB 9.8* 9.0* 8.7*   HCT 31.4* 29.3* 27.9*    285 244     Recent Labs     12/23/21 0147 12/22/21  0402 12/21/21  0412 12/20/21 2037 12/20/21 2037    141 142   < > 141   K 3.6 3.9 4.3   < > 4.1    102 110*   < > 107   CO2 35* 33* 28   < > 28   * 126* 125*   < > 183*   BUN 31* 22* 20   < > 20   CREA 1.64* 1.42* 1.24   < > 1.36*   CA 8.7 9.0 8.4*   < > 8.5   MG  --   --  2.3  --   --    PHOS  --   --  3.7  --   --    ALB  --   --  2.3*  --  2.7*   ALT  --   --  6*  --  <6*    < > = values in this interval not displayed.        Objective: Visit Vitals  /74 (BP 1 Location: Left upper arm, BP Patient Position: At rest)   Pulse 87   Temp 97.8 °F (36.6 °C)   Resp 16   Ht 5' 11.3\" (1.811 m)   Wt 74 kg (163 lb 2.3 oz)   SpO2 99%   BMI 22.56 kg/m²         Intake/Output Summary (Last 24 hours) at 12/23/2021 1645  Last data filed at 12/23/2021 1414  Gross per 24 hour   Intake 120 ml   Output 1075 ml   Net -955 ml     EXAM:   GENERAL: elderly, awake, alert, HEENT:  anicteric, EOMI, no alar flaring or epistaxis, oral mucosa moist without cyanosis, NECK:  no jugular vein distention, no retractions, no thyromegaly or masses, LUNGS: diminished breathsounds in bases , HEART:  Regular rate and rhythm with no MGR; trace ble edema, ABDOMEN:  soft with no tenderness, bowel sounds present, EXTREMITIES:  warm with no cyanosis, SKIN:  no jaundice or ecchymosis and NEUROLOGIC:  Follows commands, responds to questions, moving extremities    Jose Xiao MD  [facility] [place]

## 2021-12-23 NOTE — PROGRESS NOTES
Problem: Self Care Deficits Care Plan (Adult)  Goal: *Acute Goals and Plan of Care (Insert Text)  Description: FUNCTIONAL STATUS PRIOR TO ADMISSION: Patient was using a rolling walker for functional mobility. HOME SUPPORT PRIOR TO ADMISSION: The patient lived with spouse but did not require assist.    Occupational Therapy Goals  Initiated 12/22/2021  1. Patient will perform lower body dressing with moderate assistance  within 7 day(s). 2.  Patient will perform grooming with supervision/set-up within 7 day(s). 3.  Patient will perform toilet transfers, to [facility], with minimal assistance/contact guard assist within 7 day(s). 4.  Patient will perform all aspects of toileting with moderate assistance  within 7 day(s). 5.  Patient will participate in upper extremity therapeutic exercise/activities with supervision/set-up for 5 minutes within 7 day(s). Outcome: Progressing Towards Goal   OCCUPATIONAL THERAPY TREATMENT  Patient: Krissy Rodríguez (74 y.o. male)  Date: 12/23/2021  Diagnosis: Acute respiratory failure with hypoxia (HCC) [J96.01] Acute respiratory failure with hypoxia [facility])       Precautions: Fall  Chart, occupational therapy assessment, plan of care, and goals were reviewed. ASSESSMENT  Patient continues with skilled OT services and is progressing towards goals. Pt cont to be orthostatic with activity (see doc flow sheet). BP at rest 120/75, sitting 95/58, pt performed ankle pumps and second BP sitting 100/62, standing with assist x 2, BP 77/35 and supine BP 97/54. Pt verbalizing dizziness with positional changes. Spouse present for tx and communicated BP readings to RN. Current Level of Function Impacting Discharge (ADLs): max assist LB bathe and dress, pt limited by orthostatic hypotension    Other factors to consider for discharge:          PLAN :  Patient continues to benefit from skilled intervention to address the above impairments.   Continue treatment per established plan of care to address goals. Recommend with staff: bed in chair like position for ADl's, there ex, there act    Recommend next OT session: cont towards goals    Recommendation for discharge: (in order for the patient to meet his/her long term goals)  To be determined: per MD    This discharge recommendation:  Has not yet been discussed the attending provider and/or case management    IF patient discharges home will need the following DME:        SUBJECTIVE:   Patient stated .    OBJECTIVE DATA SUMMARY:   Cognitive/Behavioral Status:  Neurologic State: Alert  Orientation Level: Oriented to person;Oriented to situation;Disoriented to time;Disoriented to place  Cognition: Follows commands;Poor safety awareness             Functional Mobility and Transfers for ADLs:  Bed Mobility:  Rolling: Minimum assistance  Supine to Sit: Minimum assistance  Sit to Supine: Minimum assistance  Scooting: Minimum assistance    Transfers:  Sit to Stand: Minimum assistance          Balance:  Sitting: Intact  Standing: Intact; With support  Standing - Static: Fair  Standing - Dynamic : Fair    ADL Intervention:     Max assist LB bathe and dress as pt limited by orthostatic hypotension          Therapeutic Exercises:   Pt educated as to ankle pumps seated edge of bed. Activity Tolerance:   Poor    After treatment patient left in no apparent distress:   Supine in bed and Call bell within reach    COMMUNICATION/COLLABORATION:   The patients plan of care was discussed with: Physical therapist, Occupational therapist, and Registered nurse.      WILDA Rizvi/L  Time Calculation: 20 mins

## 2021-12-23 NOTE — PROGRESS NOTES
0700: Bedside and Verbal shift change report given to Aren Mittal (oncoming nurse) by Rossi Oliveros (offgoing nurse). Report included the following information SBAR, Kardex, Accordion and Cardiac Rhythm A Fib .    1445: TRANSFER - OUT REPORT:    Verbal report given to Tess Salomon RN (name) on Silvia Nagy  being transferred to 5th floor (unit) for routine progression of care       Report consisted of patients Situation, Background, Assessment and   Recommendations(SBAR). Information from the following report(s) SBAR, Kardex, Accordion and Cardiac Rhythm A Fib  was reviewed with the receiving nurse. Lines:   Peripheral IV 12/20/21 Left Forearm (Active)   Site Assessment Clean, dry, & intact 12/23/21 0814   Phlebitis Assessment 0 12/23/21 0814   Infiltration Assessment 0 12/23/21 0814   Dressing Status Clean, dry, & intact 12/23/21 0814   Dressing Type Transparent 12/23/21 0814   Hub Color/Line Status Green 12/23/21 9506   Action Taken Open ports on tubing capped 12/23/21 0814   Alcohol Cap Used Yes 12/23/21 0814       Peripheral IV 12/20/21 Anterior;Right Forearm (Active)   Site Assessment Clean, dry, & intact 12/23/21 0814   Phlebitis Assessment 0 12/23/21 0814   Infiltration Assessment 0 12/23/21 0814   Dressing Status Clean, dry, & intact 12/23/21 0814   Dressing Type Transparent 12/23/21 0814   Hub Color/Line Status Pink 12/23/21 0814   Action Taken Open ports on tubing capped 12/23/21 0814   Alcohol Cap Used Yes 12/23/21 5857        Opportunity for questions and clarification was provided. Patient transported with:   O2 @ 3 liters  Registered Nurse            This patient was assisted with Intentional Toileting every 2 hours during this shift as appropriate. Documentation of ambulation and output reflected on Flowsheet as appropriate. Purposeful hourly rounding was completed using AIDET and 5Ps. Outcomes of PHR documented as they occurred. Bed alarm in use as appropriate. Dual Suction and ambubag in place.

## 2021-12-23 NOTE — PROGRESS NOTES
Navid Perdomo [facility]  [address] [facility], [address] [place] [address]  [phone]      Medical Progress Note      NAME: Dillon Huffman   :  1935  MRM:  [id number]    Date of service: 2021  10:39 AM       Assessment and Plan:   1.  Acute respiratory failure with hypoxia most likely due to BL pleural effusion/ CHF exacerbation. Has recently thoracocentesis. CTA of the chest is negative for PE, but shows BL pleural effusion and atelectasis. Troponin is negative. Rapid covid test is negative. Doubt bacterial pneumonia. Evaluated by pulmonary      2. Acute diastolic CHF/ elevated proBNP. EF: 65%. Chest x-ray shows vascular congestion. hold diuretic due to renal insufficiency. Check I/O. Cardiology consult appreciated      3. Persistent atrial fibrillation. Hold eliquis in case thoracocentesis needed.      4.  Fall/ Scalp laceration.  CT scan of the head is negative for acute pathology.  CT scan of the cervical spine is also negative. Fall precaution      5.  Parkinson's disease.  continue sinemet     6.  Anemia.  This is most likely due to chronic disease. Monitor      7.  Hyperglycemia. hemoglobin A1c level is 6.      8. ARF. Hold lasix and monitor Cr.           Subjective:     Chief Complaint[de-identified] Patient was seen and examined as a follow up for CHF excaerbation. Chart was reviewed. no events over night     ROS:  (bold if positive, if negative)    Tolerating PT  Tolerating Diet        Objective:     Last 24hrs VS reviewed since prior progress note.  Most recent are:    Visit Vitals  /67 (BP 1 Location: Left upper arm, BP Patient Position: At rest)   Pulse 86   Temp 98 °F (36.7 °C)   Resp 20   Ht 5' 11.3\" (1.811 m)   Wt 74 kg (163 lb 2.3 oz)   SpO2 97%   BMI 22.56 kg/m²     SpO2 Readings from Last 6 Encounters:   21 97%    O2 Flow Rate (L/min): 4 l/min       Intake/Output Summary (Last 24 hours) at 2021 0803  Last data filed at 2021 0325  Gross per 24 hour Intake 360 ml   Output 975 ml   Net -615 ml        Physical Exam:    Gen:  Well-developed, well-nourished, in no acute distress  HEENT:  Pink conjunctivae, PERRL, hearing intact to voice, moist mucous membranes  Neck:  Supple, without masses, thyroid non-tender  Resp:  No accessory muscle use, clear breath sounds without wheezes rales or rhonchi  Card:  No murmurs, normal S1, S2 without thrills, bruits.  +r peripheral edema  Abd:  Soft, non-tender, non-distended, normoactive bowel sounds are present, no palpable organomegaly and no detectable hernias  Lymph:  No cervical or inguinal adenopathy  Musc:  No cyanosis or clubbing  Skin:  No rashes or ulcers, skin turgor is good  Neuro:  Cranial nerves are grossly intact, no focal motor weakness, follows commands appropriately  Psych:  Good insight, oriented to person, place and time, alert  __________________________________________________________________  Medications Reviewed: (see below)  Medications:     Current Facility-Administered Medications   Medication Dose Route Frequency    carbidopa-levodopa (SINEMET)  mg per tablet 1.5 Tablet  1.5 Tablet Oral QID    midodrine (PROAMATINE) tablet 2.5 mg  2.5 mg Oral TID    sodium chloride (NS) flush 5-40 mL  5-40 mL IntraVENous Q8H    sodium chloride (NS) flush 5-40 mL  5-40 mL IntraVENous PRN    acetaminophen (TYLENOL) tablet 650 mg  650 mg Oral Q6H PRN    Or    acetaminophen (TYLENOL) suppository 650 mg  650 mg Rectal Q6H PRN    polyethylene glycol (MIRALAX) packet 17 g  17 g Oral DAILY PRN    ondansetron (ZOFRAN ODT) tablet 4 mg  4 mg Oral Q8H PRN    Or    ondansetron (ZOFRAN) injection 4 mg  4 mg IntraVENous Q6H PRN    L.acidophilus-paracasei-S.thermophil-bifidobacter (RISAQUAD) 8 billion cell capsule  1 Capsule Oral DAILY    [Held by provider] furosemide (LASIX) injection 40 mg  40 mg IntraVENous Q12H    sodium chloride (NS) flush 5-10 mL  5-10 mL IntraVENous PRN        Lab Data Reviewed: (see below)  Lab Review:     Recent Labs     12/23/21  0147 12/22/21  0402 12/21/21 0412   WBC 7.8 8.6 8.2   HGB 9.8* 9.0* 8.7*   HCT 31.4* 29.3* 27.9*    285 244     Recent Labs     12/23/21  0147 12/22/21  0402 12/21/21 0412 12/20/21 2037 12/20/21 2037    141 142   < > 141   K 3.6 3.9 4.3   < > 4.1    102 110*   < > 107   CO2 35* 33* 28   < > 28   * 126* 125*   < > 183*   BUN 31* 22* 20   < > 20   CREA 1.64* 1.42* 1.24   < > 1.36*   CA 8.7 9.0 8.4*   < > 8.5   MG  --   --  2.3  --   --    PHOS  --   --  3.7  --   --    ALB  --   --  2.3*  --  2.7*   TBILI  --   --  1.0  --  0.8   ALT  --   --  6*  --  <6*    < > = values in this interval not displayed. No results found for: GLUCPOC  No results for input(s): PH, PCO2, PO2, HCO3, FIO2 in the last 72 hours. No results for input(s): INR, INREXT, INREXT in the last 72 hours. All Micro Results     Procedure Component Value Units Date/Time    CULTURE, MRSA [[id number]] Collected: 12/21/21 0512    Order Status: Completed Specimen: Nasal from Nares Updated: 12/22/21 0921     Special Requests: NO SPECIAL REQUESTS        Culture result: MRSA NOT PRESENT               Screening of patient nares for MRSA is for surveillance purposes and, if positive, to facilitate isolation considerations in high risk settings. It is not intended for automatic decolonization interventions per se as regimens are not sufficiently effective to warrant routine use.           CULTURE, BLOOD [[id number]] Collected: 12/20/21 2037    Order Status: Completed Specimen: Blood Updated: 12/22/21 0351     Special Requests: NO SPECIAL REQUESTS        Culture result: NO GROWTH 2 DAYS       CULTURE, BLOOD [[id number]] Collected: 12/20/21 2037    Order Status: Completed Specimen: Blood Updated: 12/22/21 0351     Special Requests: NO SPECIAL REQUESTS        Culture result: NO GROWTH 2 DAYS       COVID-19 RAPID TEST [[id number]] Collected: 12/21/21 0412    Order Status: Completed Specimen: Nasopharyngeal Updated: 12/21/21 0458     Specimen source Nasopharyngeal        COVID-19 rapid test Not detected        Comment: Rapid Abbott ID Now       Rapid NAAT:  The specimen is NEGATIVE for SARS-CoV-2, the novel coronavirus associated with COVID-19. Negative results should be treated as presumptive and, if inconsistent with clinical signs and symptoms or necessary for patient management, should be tested with an alternative molecular assay. Negative results do not preclude SARS-CoV-2 infection and should not be used as the sole basis for patient management decisions. This test has been authorized by the FDA under an Emergency Use Authorization (EUA) for use by authorized laboratories. Fact sheet for Healthcare Providers: ConventionUpdate.co.nz  Fact sheet for Patients: ConventionUpdate.co.nz       Methodology: Isothermal Nucleic Acid Amplification               I have reviewed notes of prior 24hr. Other pertinent lab: Total time spent with patient: 28 I personally reviewed chart, notes, data and current medications in the medical record. I have personally examined and treated the patient at bedside during this period.                  Care Plan discussed with: Patient, Nursing Staff and >50% of time spent in counseling and coordination of care    Discussed:  Care Plan    Prophylaxis:  SCD's    Disposition:  Home w/Family           ___________________________________________________    Attending Physician: Jovanny Guevara MD Diabetes mellitus, type 2

## 2021-12-23 NOTE — PROGRESS NOTES
Problem: Falls - Risk of  Goal: *Absence of Falls  Description: Document Kyle Leonard Fall Risk and appropriate interventions in the flowsheet. Outcome: Progressing Towards Goal  Note: Fall Risk Interventions:            Medication Interventions: Bed/chair exit alarm,Patient to call before getting OOB,Teach patient to arise slowly    Elimination Interventions: Bed/chair exit alarm,Call light in reach,Patient to call for help with toileting needs,Stay With Me (per policy),Toileting schedule/hourly rounds,Toilet paper/wipes in reach    History of Falls Interventions: Bed/chair exit alarm         Problem: Pressure Injury - Risk of  Goal: *Prevention of pressure injury  Description: Document Luisito Scale and appropriate interventions in the flowsheet.   Outcome: Progressing Towards Goal  Note: Pressure Injury Interventions:  Sensory Interventions: Assess changes in LOC,Check visual cues for pain,Keep linens dry and wrinkle-free,Minimize linen layers    Moisture Interventions: Absorbent underpads,Check for incontinence Q2 hours and as needed,Internal/External urinary devices,Minimize layers    Activity Interventions: Increase time out of bed,Pressure redistribution bed/mattress(bed type),PT/OT evaluation    Mobility Interventions: HOB 30 degrees or less,PT/OT evaluation,Pressure redistribution bed/mattress (bed type)    Nutrition Interventions: Document food/fluid/supplement intake    Friction and Shear Interventions: HOB 30 degrees or less,Minimize layers

## 2021-12-23 NOTE — PROGRESS NOTES
1313:  [facility] is interested and reviewing. [facility] can accept. Pt's wife updated. She would like to know if [facility] can accept. TCT [facility] [place]. No answer. 1130:  Met with pt's wife to discuss [facility]. [facility]. Referrals were sent in [facility] and in [address] [place] [address]. Transition of Care Plan:  RUR-13%  1. Acute dysatolic CHF, cards following  2. Acute respiratory failure with hypoxia, on 4L O2, pulmonary following  3. PT following; snf recommended (needs 2 person assist & has orthostatic hypotension)  4. Family to transport vs w/c van  5. CM following for any needs prior to d/c  IOANA Mcarthur

## 2021-12-24 NOTE — PROGRESS NOTES
1100  Pt with redness/itchy eyes. His wife states that they use saline at home. Dr. Raymond Larson ok with eye drop order. 1111  Report called to [place]. Transport set up for 1345. Discharge medications reviewed with patient and wife and appropriate educational materials and side effects teaching were provided. I have reviewed discharge instructions with the patient and wife. They verbalized understanding. AVS signed and given to patient and wife. Peripheral IV removed x 2. Information for [address] reviewed with wife.

## 2021-12-24 NOTE — PROGRESS NOTES
12/24/21 10:54 AM  CM spoke with Andre Diallo and [facility] [place] [facility] has accepted patient for SNF placement and they can admit today. Patient currently on 2L and will go to facility on this amount of oxygen and they will wean there. Wife requesting ambulance transport due to patient requiring 2 person assist in addition to O2 requirement. SNF transition note to follow. PCS form completed and faxed to [facility] ambulance service. Ambulance packet also on chart.   NATALIIA Poole

## 2021-12-24 NOTE — DISCHARGE INSTRUCTIONS
Patient Education        Avoiding Triggers With Heart Failure: Care Instruction  ACUTE DIAGNOSES:  Acute respiratory failure with hypoxia ([facility] [address] [J96.01]    CHRONIC MEDICAL DIAGNOSES:  Problem List as of 12/24/2021 Date Reviewed: 12/21/2021          Codes Class Noted - Resolved    * (Principal) Acute respiratory failure with hypoxia [facility]) ICD-10-CM: J96.01  ICD-9-CM: 518.81  12/20/2021 - Present              DISCHARGE MEDICATIONS:          · It is important that you take the medication exactly as they are prescribed. · Keep your medication in the bottles provided by the pharmacist and keep a list of the medication names, dosages, and times to be taken in your wallet. · Do not take other medications without consulting your doctor. DIET:  Regular Diet    ACTIVITY: Activity as tolerated    ADDITIONAL INFORMATION: If you experience any of the following symptoms then please call your primary care physician or return to the emergency room if you cannot get hold of your doctor: Fever, chills, nausea, vomiting, diarrhea, change in mentation, falling, bleeding, shortness of breath. FOLLOW UP CARE:  Dr. Klaudia Barker MD  you are to call and set up an appointment to see them in 5 days. Follow-up with pulomonary      Information obtained by :  I understand that if any problems occur once I am at home I am to contact my physician. I understand and acknowledge receipt of the instructions indicated above.                                                                                                                                            Physician's or R.N.'s Signature                                                                  Date/Time                                                                                                                                              Patient or Representative Signature                                                          Date/Time      Your Care Instructions     Triggers are anything that make your heart failure flare up. A flare-up is also called \"sudden heart failure\" or \"acute heart failure. \" When you have a flare-up, fluid builds up in your lungs, and you have problems breathing. You might need to go to the hospital. By watching for changes in your condition and avoiding triggers, you can prevent heart failure flare-ups. Follow-up care is a key part of your treatment and safety. Be sure to make and go to all appointments, and call your doctor if you are having problems. It's also a good idea to know your test results and keep a list of the medicines you take. How can you care for yourself at home? Watch for changes in your weight and condition  · Weigh yourself without clothing at the same time each day. Record your weight. Call your doctor if you have sudden weight gain, such as more than 2 to 3 pounds in a day or 5 pounds in a week. (Your doctor may suggest a different range of weight gain.) A sudden weight gain may mean that your heart failure is getting worse. · Keep a daily record of your symptoms. Write down any changes in how you feel, such as new shortness of breath, cough, or problems eating. Also record if your ankles are more swollen than usual and if you feel more tired than usual. Note anything that you ate or did that could have triggered these changes. Limit sodium  Sodium causes your body to hold on to extra water. This may cause your heart failure symptoms to get worse. People get most of their sodium from processed foods. Fast food and restaurant meals also tend to be very high in sodium. · Your doctor may suggest that you limit sodium. Your doctor can tell you how much sodium is right for you. This includes limiting sodium in cooked and packaged foods. · Read food labels on cans and food packages. They tell you how much sodium you get in one serving. Check the serving size.  If you eat more than one serving, you are getting more sodium. · Be aware that sodium can come in forms other than salt, including monosodium glutamate (MSG), sodium citrate, and sodium bicarbonate (baking soda). MSG is often added to Asian food. You can sometimes ask for food without MSG or salt. · Slowly reducing salt will help you adjust to the taste. Take the salt shaker off the table. · Flavor your food with garlic, lemon juice, onion, vinegar, herbs, and spices instead of salt. Do not use soy sauce, steak sauce, onion salt, garlic salt, mustard, or ketchup on your food, unless it is labeled \"low-sodium\" or \"low-salt. \"  · Make your own salad dressings, sauces, and ketchup without adding salt. · Use fresh or frozen ingredients, instead of canned ones, whenever you can. Choose low-sodium canned goods. · Eat less processed food and food from restaurants, including fast food. Exercise as directed  Moderate, regular exercise is very good for your heart. It improves your blood flow and helps control your weight. But too much exercise can stress your heart and cause a heart failure flare-up. · Check with your doctor before you start an exercise program.  · Walking is an easy way to get exercise. Start out slowly. Gradually increase the length and pace of your walk. Swimming, riding a bike, and using a treadmill are also good forms of exercise. · When you exercise, watch for signs that your heart is working too hard. You are pushing yourself too hard if you cannot talk while you are exercising. If you become short of breath or dizzy or have chest pain, stop, sit down, and rest.  · Do not exercise when you do not feel well. Take medicines correctly  · Take your medicines exactly as prescribed. Call your doctor if you think you are having a problem with your medicine. · Make a list of all the medicines you take. Include those prescribed to you by other doctors and any over-the-counter medicines, vitamins, or supplements you take.  Take this list with you when you go to any doctor. · Take your medicines at the same time every day. It may help you to post a list of all the medicines you take every day and what time of day you take them. · Make taking your medicine as simple as you can. Plan times to take your medicines when you are doing other things, such as eating a meal or getting ready for bed. This will make it easier to remember to take your medicines. · Get organized. Use helpful tools, such as daily or weekly pill containers. When should you call for help? Call 911  if you have symptoms of sudden heart failure such as:    · You have severe trouble breathing.     · You cough up pink, foamy mucus.     · You have a new irregular or rapid heartbeat. Call your doctor now or seek immediate medical care if:    · You have new or increased shortness of breath.     · You are dizzy or lightheaded, or you feel like you may faint.     · You have sudden weight gain, such as more than 2 to 3 pounds in a day or 5 pounds in a week. (Your doctor may suggest a different range of weight gain.)     · You have increased swelling in your legs, ankles, or feet.     · You are suddenly so tired or weak that you cannot do your usual activities. Watch closely for changes in your health, and be sure to contact your doctor if you develop new symptoms. Where can you learn more? Go to http://www.gray.com/  Enter V089 in the search box to learn more about \"Avoiding Triggers With Heart Failure: Care Instructions. \"  Current as of: April 29, 2021               Content Version: 13.0  © 0929-3337 [employer]. Care instructions adapted under license by [employer] (which disclaims liability or warranty for this information). If you have questions about a medical condition or this instruction, always ask your healthcare professional. [address] any warranty or liability for your use of this information.

## 2021-12-24 NOTE — PROGRESS NOTES
Pharmacist Discharge Medication Reconciliation    Discharge Provider:  Eda Jeans     Discharge Medications:  Eliquis had been held due to fall and possibility of needing procedure;  CT head neg;  Resuming at discharge. My Medications        CONTINUE taking these medications        Instructions Each Dose to Equal Morning Noon Evening Bedtime   carbidopa-levodopa  mg per tablet  Commonly known as: SINEMET      Take 1.5 Tablets by mouth four (4) times daily. 1.5 Tablet         Eliquis 5 mg tablet  Generic drug: apixaban      Take 5 mg by mouth two (2) times a day. 5 mg         midodrine 2.5 mg tablet  Commonly known as: PROAMATINE      Take 2.5 mg by mouth three (3) times daily.    2.5 mg                     The patient's chart, MAR, and AVS were reviewed by   Nicko Alonso,   Contact: [phone]

## 2021-12-24 NOTE — PROGRESS NOTES
Transition of Care Plan to SNF/Rehab    SNF/Rehab Transition:  Patient has been accepted to [facility] and meets criteria for admission. Room assignment is 304. Patient will transported by [facility] and expected to leave at 1:45PM.    Communication to Patient/Family:  Met with patient and Nisa Patricio, his wife, (identified care giver) and they are agreeable to the transition plan. Communication to SNF/Rehab:  Bedside RN, Alexandre, has been notified to update the transition plan to the facility and call report (phone number [phone]). Discharge information has been updated on the AVS.     Discharge instructions have been sent to facility in [employer]. Nursing Please include all hard scripts for controlled substances, med rec and dc summary, and AVS in packet.      Reviewed and confirmed with facility, [facility], can manage the patient care needs for the following:     SNF/Rehab Transition:  Patient to follow-up with Home Health: [address] of patient's choice, TBD at time of dc from SNF if needed    Reviewed and confirmed with facility, [facility] they can manage the patient care needs for the following:     Lorna Ala with (X) only those applicable:    Medication:  [x]  Medications will be available at the facility  []  IV Antibiotics   [x]  Controlled Substance - hard copy to be sent with patient   []  Weekly Labs   Documents:  [x] Hard RX  [x] MAR  [] Kardex  [x] AVS  []Transfer Summary  [x]Discharge   Equipment:  []  CPAP/BiPAP  []  Wound Vacuum  [x]  George or Urinary Device  []  PICC/Central Line  []  Nebulizer  []  Ventilator   Treatment:  []Isolation (for MRSA, VRE, etc.)  []Surgical Drain Management  []Tracheostomy Care  [x]Dressing Changes  []Dialysis with transportation and chair time   []PEG Care  [x]Oxygen (2L)  []Daily Weights for Heart Failure   Dietary:  []Any diet limitations  []Tube Feedings   []Total Parenteral Management (TPN)   Eligible for Medicaid Long Term Services and Supports  Yes:  [] Eligible for medical assistance or will become eligible within 180 days and UAI completed. [] Provider/Patient and/or support system has requested screening. [] UAI copy provided to patient or responsible party  [] UAI unavailable at discharge will send once processed to SNF provider. [] UAI unavailable at discharged mailed to patient  No:   [x] Private pay and is not financially eligible for Medicaid within the next 180 days. [] Reside out-of-state.   [] A residents of a state owned/operated facility that is licensed  by [address] [facility] or [facility]  [] Enrollment in [facility] hospice services  [] [address]  [] Patient /Family declines to have screening completed or provide financial information for screening     Financial Resources:  Medicaid    [] Initiated and application pending   [] Full coverage     Advanced Care Plan:  []Surrogate Decision Maker of Care  []POA  []Communicated Code Status (DDNR\", \"Full\")    Other     Financial Resources:  Medicaid    [] Initiated and application pending   [] Full coverage     Advanced Care Plan:  []Surrogate Decision Maker of Care  []POA  []Communicated Code Status (DDNR\", \"Full\")    Other     NATALIIA Saucedo

## 2021-12-24 NOTE — PROGRESS NOTES
Bedside, Verbal and Written shift change report given to Amairani RN (oncoming nurse) by Brianna Billingsley RN (offgoing nurse). Report included the following information SBAR, Kardex, ED Summary, OR Summary, Procedure Summary, Intake/Output, MAR, Recent Results and Med Rec Status.

## 2021-12-24 NOTE — DISCHARGE SUMMARY
Hospitalist Discharge Summary     Patient ID:    Edyta Villaseñor  [id number]  81 y.o.  1935    Admit date of service: 12/20/2021    Discharge date of service: 12/24/2021    Admission Diagnoses: Acute respiratory failure with hypoxia ([facility] [address] [J96.01]    Chronic Diagnoses:    Problem List as of 12/24/2021 Date Reviewed: 12/21/2021          Codes Class Noted - Resolved    * (Principal) Acute respiratory failure with hypoxia [facility]) ICD-10-CM: J96.01  ICD-9-CM: 518.81  12/20/2021 - Present              Discharge Medications:   Current Discharge Medication List      CONTINUE these medications which have NOT CHANGED    Details   apixaban (Eliquis) 5 mg tablet Take 5 mg by mouth two (2) times a day. midodrine (PROAMATINE) 2.5 mg tablet Take 2.5 mg by mouth three (3) times daily. carbidopa-levodopa (SINEMET)  mg per tablet Take 1.5 Tablets by mouth four (4) times daily. Follow up Care:    1. Jordan Guaman MD in 1-2 weeks  2. Pulmonary     Diet:  Regular Diet    Disposition:  SNF. Advanced Directive:    Discharge Exam:  See today's note. CONSULTATIONS: Cardiology and Pulmonary/Intensive care    Significant Diagnostic Studies:   Recent Labs     12/24/21  0126 12/23/21  0147   WBC 7.4 7.8   HGB 9.2* 9.8*   HCT 30.0* 31.4*    291     Recent Labs     12/24/21  0126 12/23/21  0147 12/22/21  0402    139 141   K 3.9 3.6 3.9    100 102   CO2 37* 35* 33*   BUN 38* 31* 22*   CREA 1.65* 1.64* 1.42*   * 126* 126*   CA 8.3* 8.7 9.0     No results for input(s): ALT, AP, TBIL, TBILI, TP, ALB, GLOB, GGT, AML, LPSE in the last 72 hours. No lab exists for component: SGOT, GPT, AMYP, HLPSE  No results for input(s): INR, PTP, APTT, INREXT in the last 72 hours. No results for input(s): FE, TIBC, PSAT, FERR in the last 72 hours. No results for input(s): PH, PCO2, PO2 in the last 72 hours. No results for input(s): CPK, CKMB in the last 72 hours.     No lab exists for component: TROPONINI  No results found for: [address]:   1.  Acute respiratory failure with hypoxia most likely due to BL pleural effusion/ CHF exacerbation. Has recently thoracocentesis.  CTA of the chest is negative for PE, but shows BL pleural effusion and atelectasis. Troponin is negative. Rapid covid test is negative. Doubt bacterial pneumonia. Evaluated by pulmonary      2.  Acute diastolic CHF/ elevated proBNP. EF: 65%. Chest x-ray shows vascular congestion. hold diuretic due to renal insufficiency. Cardiology consult appreciated      3.  Persistent atrial fibrillation. continue eliquis       4.  Fall/ Scalp laceration.  CT scan of the head is negative for acute pathology.  CT scan of the cervical spine is also negative. Fall precaution      5.  Parkinson's disease.  continue sinemet     6.  Anemia.  This is most likely due to chronic disease.  Monitor      7.  Hyperglycemia. hemoglobin A1c level is 6.       8. ARF. Hold lasix and monitor Cr. Discharged in stable condition.     Spent 35 minutes    Signed:  Ruby Cobos MD  12/24/2021  10:14 AM

## 2021-12-27 NOTE — PROGRESS NOTES
Transition of care outreach postponed for 14 days due to patient's discharge to SNF. Per EMR discharged to [employer] Will continue to monitor patient progress.

## 2021-12-28 NOTE — PROGRESS NOTES
Post Acute Facility Update     *The information contained in this note was received during a weekly care coordination call with the post acute facility*    Current Facility: [facility] ([facility])  Anticipated Discharge Date: TBD    Facility Nursing Update: :12/27/21 Patient continues to be symptomatic with low blood pressure. Increase midodrine to 5 mg 3 times a day:will attempt to wean off from 02 supportive care for now;Parkinson's disease continue sinemet. Wife reported patient was started on this treatment years ago after his stroke. She did not see any particular improvements. Currently patient is taking one half pill 4 times a day. Patient is currently with poor oral intake which could be multifactorial. I will cut down the dose of Sinemet 1-1/2 pill twice daily for now / we'll continue to monitor  Facility Social Work Update: no current  update  Bundle Program Status: [facility] CHF  Upper Extremity Assistance: Minimal Assistance   Lower Extremity Assistance: Maximum Assistance  Bed Mobility: Minimal Assistance   Transfers: Minimal Assistance   Ambulation: unable   How far (in feet) is the patient ambulating?  N/a due to Orthostatic BP  Device Used: walker  Barriers to Discharge: TBD  Other: orthostatic BP    Joseph Andrade LPN Care Coordinator

## 2022-01-01 ENCOUNTER — TRANSCRIBE ORDER (OUTPATIENT)
Dept: GENERAL RADIOLOGY | Age: 87
End: 2022-01-01

## 2022-01-01 ENCOUNTER — APPOINTMENT (OUTPATIENT)
Dept: GENERAL RADIOLOGY | Age: 87
DRG: 871 | End: 2022-01-01
Attending: NURSE PRACTITIONER
Payer: MEDICARE

## 2022-01-01 ENCOUNTER — PATIENT OUTREACH (OUTPATIENT)
Dept: CASE MANAGEMENT | Age: 87
End: 2022-01-01

## 2022-01-01 ENCOUNTER — APPOINTMENT (OUTPATIENT)
Dept: CT IMAGING | Age: 87
DRG: 056 | End: 2022-01-01
Attending: FAMILY MEDICINE
Payer: MEDICARE

## 2022-01-01 ENCOUNTER — HOSPICE ADMISSION (OUTPATIENT)
Dept: HOSPICE | Facility: HOSPICE | Age: 87
End: 2022-01-01
Payer: MEDICARE

## 2022-01-01 ENCOUNTER — APPOINTMENT (OUTPATIENT)
Dept: GENERAL RADIOLOGY | Age: 87
DRG: 056 | End: 2022-01-01
Attending: EMERGENCY MEDICINE
Payer: MEDICARE

## 2022-01-01 ENCOUNTER — APPOINTMENT (OUTPATIENT)
Dept: CT IMAGING | Age: 87
DRG: 871 | End: 2022-01-01
Attending: EMERGENCY MEDICINE
Payer: MEDICARE

## 2022-01-01 ENCOUNTER — HOSPITAL ENCOUNTER (INPATIENT)
Age: 87
LOS: 5 days | Discharge: HOME HEALTH | DRG: 871 | End: 2022-02-11
Attending: EMERGENCY MEDICINE | Admitting: INTERNAL MEDICINE
Payer: MEDICARE

## 2022-01-01 ENCOUNTER — APPOINTMENT (OUTPATIENT)
Dept: ULTRASOUND IMAGING | Age: 87
DRG: 871 | End: 2022-01-01
Attending: INTERNAL MEDICINE
Payer: MEDICARE

## 2022-01-01 ENCOUNTER — HOME CARE VISIT (OUTPATIENT)
Dept: HOSPICE | Facility: HOSPICE | Age: 87
End: 2022-01-01
Payer: MEDICARE

## 2022-01-01 ENCOUNTER — HOME CARE VISIT (OUTPATIENT)
Dept: SCHEDULING | Facility: HOME HEALTH | Age: 87
End: 2022-01-01
Payer: MEDICARE

## 2022-01-01 ENCOUNTER — APPOINTMENT (OUTPATIENT)
Dept: GENERAL RADIOLOGY | Age: 87
DRG: 871 | End: 2022-01-01
Attending: EMERGENCY MEDICINE
Payer: MEDICARE

## 2022-01-01 ENCOUNTER — APPOINTMENT (OUTPATIENT)
Dept: CT IMAGING | Age: 87
DRG: 056 | End: 2022-01-01
Attending: EMERGENCY MEDICINE
Payer: MEDICARE

## 2022-01-01 ENCOUNTER — HOSPITAL ENCOUNTER (OUTPATIENT)
Dept: GENERAL RADIOLOGY | Age: 87
Discharge: HOME | End: 2022-03-03
Payer: MEDICARE

## 2022-01-01 ENCOUNTER — APPOINTMENT (OUTPATIENT)
Dept: MRI IMAGING | Age: 87
DRG: 056 | End: 2022-01-01
Attending: INTERNAL MEDICINE
Payer: MEDICARE

## 2022-01-01 ENCOUNTER — HOSPITAL ENCOUNTER (INPATIENT)
Age: 87
LOS: 5 days | Discharge: HOSPICE | DRG: 056 | End: 2022-04-09
Attending: EMERGENCY MEDICINE | Admitting: INTERNAL MEDICINE
Payer: MEDICARE

## 2022-01-01 VITALS
RESPIRATION RATE: 17 BRPM | WEIGHT: 151 LBS | OXYGEN SATURATION: 95 % | SYSTOLIC BLOOD PRESSURE: 135 MMHG | HEIGHT: 71 IN | DIASTOLIC BLOOD PRESSURE: 69 MMHG | TEMPERATURE: 97.9 F | HEART RATE: 89 BPM | BODY MASS INDEX: 21.14 KG/M2

## 2022-01-01 VITALS
BODY MASS INDEX: 22.01 KG/M2 | OXYGEN SATURATION: 100 % | DIASTOLIC BLOOD PRESSURE: 53 MMHG | RESPIRATION RATE: 16 BRPM | HEIGHT: 71 IN | HEART RATE: 74 BPM | TEMPERATURE: 98.2 F | WEIGHT: 157.19 LBS | SYSTOLIC BLOOD PRESSURE: 142 MMHG

## 2022-01-01 VITALS
DIASTOLIC BLOOD PRESSURE: 82 MMHG | OXYGEN SATURATION: 90 % | RESPIRATION RATE: 18 BRPM | HEART RATE: 57 BPM | SYSTOLIC BLOOD PRESSURE: 140 MMHG

## 2022-01-01 VITALS
DIASTOLIC BLOOD PRESSURE: 70 MMHG | TEMPERATURE: 98.4 F | HEART RATE: 64 BPM | SYSTOLIC BLOOD PRESSURE: 119 MMHG | RESPIRATION RATE: 14 BRPM

## 2022-01-01 VITALS
TEMPERATURE: 98.3 F | DIASTOLIC BLOOD PRESSURE: 93 MMHG | HEART RATE: 59 BPM | SYSTOLIC BLOOD PRESSURE: 125 MMHG | RESPIRATION RATE: 14 BRPM

## 2022-01-01 DIAGNOSIS — Z86.73 HISTORY OF CVA (CEREBROVASCULAR ACCIDENT): ICD-10-CM

## 2022-01-01 DIAGNOSIS — Z71.89 GOALS OF CARE, COUNSELING/DISCUSSION: ICD-10-CM

## 2022-01-01 DIAGNOSIS — R77.8 ELEVATED TROPONIN: ICD-10-CM

## 2022-01-01 DIAGNOSIS — J90 PLEURAL EFFUSION: ICD-10-CM

## 2022-01-01 DIAGNOSIS — R55 SYNCOPE, UNSPECIFIED SYNCOPE TYPE: ICD-10-CM

## 2022-01-01 DIAGNOSIS — R63.0 POOR APPETITE: ICD-10-CM

## 2022-01-01 DIAGNOSIS — R55 CONVULSIVE SYNCOPE: ICD-10-CM

## 2022-01-01 DIAGNOSIS — J96.01 ACUTE RESPIRATORY FAILURE WITH HYPOXIA (HCC): Primary | ICD-10-CM

## 2022-01-01 DIAGNOSIS — Z51.5 PALLIATIVE CARE BY SPECIALIST: ICD-10-CM

## 2022-01-01 DIAGNOSIS — U07.1 CLINICAL DIAGNOSIS OF COVID-19: ICD-10-CM

## 2022-01-01 DIAGNOSIS — R06.02 SHORTNESS OF BREATH: ICD-10-CM

## 2022-01-01 DIAGNOSIS — G47.61 PERIODIC LIMB MOVEMENTS OF SLEEP: ICD-10-CM

## 2022-01-01 DIAGNOSIS — R94.31 ABNORMAL EKG: ICD-10-CM

## 2022-01-01 DIAGNOSIS — I48.91 ATRIAL FIBRILLATION WITH RAPID VENTRICULAR RESPONSE (HCC): ICD-10-CM

## 2022-01-01 DIAGNOSIS — J90 CHRONIC BILATERAL PLEURAL EFFUSIONS: ICD-10-CM

## 2022-01-01 DIAGNOSIS — R41.89 UNRESPONSIVE EPISODE: Primary | ICD-10-CM

## 2022-01-01 DIAGNOSIS — G93.41 ACUTE METABOLIC ENCEPHALOPATHY: ICD-10-CM

## 2022-01-01 DIAGNOSIS — R40.0 SOMNOLENCE: ICD-10-CM

## 2022-01-01 DIAGNOSIS — G93.40 ACUTE ENCEPHALOPATHY: ICD-10-CM

## 2022-01-01 DIAGNOSIS — I95.1 ORTHOSTATIC HYPOTENSION: ICD-10-CM

## 2022-01-01 DIAGNOSIS — G20 PARKINSON DISEASE (HCC): ICD-10-CM

## 2022-01-01 DIAGNOSIS — J90 CHRONIC BILATERAL PLEURAL EFFUSIONS: Primary | ICD-10-CM

## 2022-01-01 DIAGNOSIS — R55 SYNCOPE AND COLLAPSE: ICD-10-CM

## 2022-01-01 LAB
ALBUMIN SERPL-MCNC: 2.6 G/DL (ref 3.5–5)
ALBUMIN SERPL-MCNC: 2.7 G/DL (ref 3.5–5)
ALBUMIN SERPL-MCNC: 2.7 G/DL (ref 3.5–5)
ALBUMIN/GLOB SERPL: 0.5 {RATIO} (ref 1.1–2.2)
ALBUMIN/GLOB SERPL: 0.5 {RATIO} (ref 1.1–2.2)
ALBUMIN/GLOB SERPL: 0.6 {RATIO} (ref 1.1–2.2)
ALP SERPL-CCNC: 65 U/L (ref 45–117)
ALP SERPL-CCNC: 76 U/L (ref 45–117)
ALP SERPL-CCNC: 81 U/L (ref 45–117)
ALT SERPL-CCNC: <6 U/L (ref 12–78)
ANION GAP SERPL CALC-SCNC: 1 MMOL/L (ref 5–15)
ANION GAP SERPL CALC-SCNC: 1 MMOL/L (ref 5–15)
ANION GAP SERPL CALC-SCNC: 2 MMOL/L (ref 5–15)
ANION GAP SERPL CALC-SCNC: 4 MMOL/L (ref 5–15)
ANION GAP SERPL CALC-SCNC: 5 MMOL/L (ref 5–15)
ANION GAP SERPL CALC-SCNC: 5 MMOL/L (ref 5–15)
ANION GAP SERPL CALC-SCNC: 6 MMOL/L (ref 5–15)
APPEARANCE FLD: ABNORMAL
APPEARANCE UR: CLEAR
APTT PPP: 32.7 SEC (ref 22.1–31)
ARTERIAL PATENCY WRIST A: YES
AST SERPL-CCNC: 12 U/L (ref 15–37)
AST SERPL-CCNC: 12 U/L (ref 15–37)
AST SERPL-CCNC: 9 U/L (ref 15–37)
ATRIAL RATE: 102 BPM
ATRIAL RATE: 267 BPM
B PERT DNA SPEC QL NAA+PROBE: NOT DETECTED
BACTERIA SPEC CULT: NORMAL
BACTERIA URNS QL MICRO: NEGATIVE /HPF
BASE EXCESS BLDA CALC-SCNC: 7.9 MMOL/L
BASOPHILS # BLD: 0 K/UL (ref 0–0.1)
BASOPHILS NFR BLD: 0 % (ref 0–1)
BASOPHILS NFR BLD: 1 % (ref 0–1)
BDY SITE: ABNORMAL
BILIRUB SERPL-MCNC: 0.7 MG/DL (ref 0.2–1)
BILIRUB SERPL-MCNC: 0.8 MG/DL (ref 0.2–1)
BILIRUB SERPL-MCNC: 0.9 MG/DL (ref 0.2–1)
BILIRUB UR QL CFM: POSITIVE
BNP SERPL-MCNC: 3524 PG/ML
BNP SERPL-MCNC: 7007 PG/ML
BORDETELLA PARAPERTUSSIS PCR, BORPAR: NOT DETECTED
BUN SERPL-MCNC: 19 MG/DL (ref 6–20)
BUN SERPL-MCNC: 22 MG/DL (ref 6–20)
BUN SERPL-MCNC: 23 MG/DL (ref 6–20)
BUN SERPL-MCNC: 25 MG/DL (ref 6–20)
BUN SERPL-MCNC: 26 MG/DL (ref 6–20)
BUN SERPL-MCNC: 26 MG/DL (ref 6–20)
BUN SERPL-MCNC: 29 MG/DL (ref 6–20)
BUN/CREAT SERPL: 16 (ref 12–20)
BUN/CREAT SERPL: 17 (ref 12–20)
BUN/CREAT SERPL: 22 (ref 12–20)
BUN/CREAT SERPL: 23 (ref 12–20)
BUN/CREAT SERPL: 24 (ref 12–20)
BUN/CREAT SERPL: 25 (ref 12–20)
BUN/CREAT SERPL: 26 (ref 12–20)
C PNEUM DNA SPEC QL NAA+PROBE: NOT DETECTED
CALCIUM SERPL-MCNC: 8.3 MG/DL (ref 8.5–10.1)
CALCIUM SERPL-MCNC: 8.5 MG/DL (ref 8.5–10.1)
CALCIUM SERPL-MCNC: 8.5 MG/DL (ref 8.5–10.1)
CALCIUM SERPL-MCNC: 8.7 MG/DL (ref 8.5–10.1)
CALCIUM SERPL-MCNC: 8.7 MG/DL (ref 8.5–10.1)
CALCIUM SERPL-MCNC: 9 MG/DL (ref 8.5–10.1)
CALCIUM SERPL-MCNC: 9.3 MG/DL (ref 8.5–10.1)
CALCULATED R AXIS, ECG10: 53 DEGREES
CALCULATED R AXIS, ECG10: 64 DEGREES
CALCULATED T AXIS, ECG11: -98 DEGREES
CALCULATED T AXIS, ECG11: 65 DEGREES
CHLORIDE SERPL-SCNC: 105 MMOL/L (ref 97–108)
CHLORIDE SERPL-SCNC: 108 MMOL/L (ref 97–108)
CHLORIDE SERPL-SCNC: 108 MMOL/L (ref 97–108)
CHLORIDE SERPL-SCNC: 94 MMOL/L (ref 97–108)
CHLORIDE SERPL-SCNC: 94 MMOL/L (ref 97–108)
CHLORIDE SERPL-SCNC: 95 MMOL/L (ref 97–108)
CHLORIDE SERPL-SCNC: 97 MMOL/L (ref 97–108)
CO2 SERPL-SCNC: 25 MMOL/L (ref 21–32)
CO2 SERPL-SCNC: 28 MMOL/L (ref 21–32)
CO2 SERPL-SCNC: 28 MMOL/L (ref 21–32)
CO2 SERPL-SCNC: 39 MMOL/L (ref 21–32)
CO2 SERPL-SCNC: 42 MMOL/L (ref 21–32)
COLOR FLD: YELLOW
COLOR UR: ABNORMAL
COMMENT, HOLDF: NORMAL
COMMENT, HOLDF: NORMAL
COVID-19 RAPID TEST, COVR: NOT DETECTED
CREAT SERPL-MCNC: 0.98 MG/DL (ref 0.7–1.3)
CREAT SERPL-MCNC: 1.01 MG/DL (ref 0.7–1.3)
CREAT SERPL-MCNC: 1.02 MG/DL (ref 0.7–1.3)
CREAT SERPL-MCNC: 1.1 MG/DL (ref 0.7–1.3)
CREAT SERPL-MCNC: 1.15 MG/DL (ref 0.7–1.3)
CREAT SERPL-MCNC: 1.18 MG/DL (ref 0.7–1.3)
CREAT SERPL-MCNC: 1.23 MG/DL (ref 0.7–1.3)
CREAT SERPL-MCNC: 1.26 MG/DL (ref 0.7–1.3)
CRP SERPL-MCNC: 7.33 MG/DL (ref 0–0.6)
D DIMER PPP FEU-MCNC: 8.64 MG/L FEU (ref 0–0.65)
DIAGNOSIS, 93000: NORMAL
DIAGNOSIS, 93000: NORMAL
DIFFERENTIAL METHOD BLD: ABNORMAL
EOSINOPHIL # BLD: 0 K/UL (ref 0–0.4)
EOSINOPHIL # BLD: 0 K/UL (ref 0–0.4)
EOSINOPHIL # BLD: 0.1 K/UL (ref 0–0.4)
EOSINOPHIL NFR BLD: 0 % (ref 0–7)
EOSINOPHIL NFR BLD: 1 % (ref 0–7)
EOSINOPHIL NFR BLD: 1 % (ref 0–7)
EOSINOPHIL NFR BLD: 2 % (ref 0–7)
EOSINOPHIL NFR BLD: 2 % (ref 0–7)
EPITH CASTS URNS QL MICRO: ABNORMAL /LPF
ERYTHROCYTE [DISTWIDTH] IN BLOOD BY AUTOMATED COUNT: 15 % (ref 11.5–14.5)
ERYTHROCYTE [DISTWIDTH] IN BLOOD BY AUTOMATED COUNT: 15.2 % (ref 11.5–14.5)
ERYTHROCYTE [DISTWIDTH] IN BLOOD BY AUTOMATED COUNT: 15.4 % (ref 11.5–14.5)
ERYTHROCYTE [DISTWIDTH] IN BLOOD BY AUTOMATED COUNT: 15.9 % (ref 11.5–14.5)
ERYTHROCYTE [DISTWIDTH] IN BLOOD BY AUTOMATED COUNT: 17.5 % (ref 11.5–14.5)
ERYTHROCYTE [DISTWIDTH] IN BLOOD BY AUTOMATED COUNT: 17.5 % (ref 11.5–14.5)
ERYTHROCYTE [DISTWIDTH] IN BLOOD BY AUTOMATED COUNT: 17.6 % (ref 11.5–14.5)
ERYTHROCYTE [DISTWIDTH] IN BLOOD BY AUTOMATED COUNT: 17.7 % (ref 11.5–14.5)
FERRITIN SERPL-MCNC: 340 NG/ML (ref 26–388)
FIBRINOGEN PPP-MCNC: 478 MG/DL (ref 200–475)
FLUAV H1 2009 PAND RNA SPEC QL NAA+PROBE: NOT DETECTED
FLUAV H1 RNA SPEC QL NAA+PROBE: NOT DETECTED
FLUAV H3 RNA SPEC QL NAA+PROBE: NOT DETECTED
FLUAV SUBTYP SPEC NAA+PROBE: NOT DETECTED
FLUBV RNA SPEC QL NAA+PROBE: NOT DETECTED
GAS FLOW.O2 O2 DELIVERY SYS: 3 L/MIN
GLOBULIN SER CALC-MCNC: 4.4 G/DL (ref 2–4)
GLOBULIN SER CALC-MCNC: 5.2 G/DL (ref 2–4)
GLOBULIN SER CALC-MCNC: 5.3 G/DL (ref 2–4)
GLUCOSE FLD-MCNC: 133 MG/DL
GLUCOSE SERPL-MCNC: 101 MG/DL (ref 65–100)
GLUCOSE SERPL-MCNC: 101 MG/DL (ref 65–100)
GLUCOSE SERPL-MCNC: 105 MG/DL (ref 65–100)
GLUCOSE SERPL-MCNC: 124 MG/DL (ref 65–100)
GLUCOSE SERPL-MCNC: 131 MG/DL (ref 65–100)
GLUCOSE SERPL-MCNC: 163 MG/DL (ref 65–100)
GLUCOSE SERPL-MCNC: 97 MG/DL (ref 65–100)
GLUCOSE UR STRIP.AUTO-MCNC: NEGATIVE MG/DL
GRAM STN SPEC: NORMAL
GRAM STN SPEC: NORMAL
HADV DNA SPEC QL NAA+PROBE: NOT DETECTED
HCO3 BLDA-SCNC: 36 MMOL/L (ref 22–26)
HCOV 229E RNA SPEC QL NAA+PROBE: NOT DETECTED
HCOV HKU1 RNA SPEC QL NAA+PROBE: NOT DETECTED
HCOV NL63 RNA SPEC QL NAA+PROBE: NOT DETECTED
HCOV OC43 RNA SPEC QL NAA+PROBE: NOT DETECTED
HCT VFR BLD AUTO: 25.4 % (ref 36.6–50.3)
HCT VFR BLD AUTO: 26.5 % (ref 36.6–50.3)
HCT VFR BLD AUTO: 27.3 % (ref 36.6–50.3)
HCT VFR BLD AUTO: 29.4 % (ref 36.6–50.3)
HCT VFR BLD AUTO: 30.5 % (ref 36.6–50.3)
HCT VFR BLD AUTO: 31.6 % (ref 36.6–50.3)
HCT VFR BLD AUTO: 31.9 % (ref 36.6–50.3)
HCT VFR BLD AUTO: 35.3 % (ref 36.6–50.3)
HGB BLD-MCNC: 10.1 G/DL (ref 12.1–17)
HGB BLD-MCNC: 7.7 G/DL (ref 12.1–17)
HGB BLD-MCNC: 7.9 G/DL (ref 12.1–17)
HGB BLD-MCNC: 8.1 G/DL (ref 12.1–17)
HGB BLD-MCNC: 8.9 G/DL (ref 12.1–17)
HGB BLD-MCNC: 9 G/DL (ref 12.1–17)
HGB BLD-MCNC: 9.3 G/DL (ref 12.1–17)
HGB BLD-MCNC: 9.3 G/DL (ref 12.1–17)
HGB UR QL STRIP: NEGATIVE
HMPV RNA SPEC QL NAA+PROBE: NOT DETECTED
HPIV1 RNA SPEC QL NAA+PROBE: NOT DETECTED
HPIV2 RNA SPEC QL NAA+PROBE: NOT DETECTED
HPIV3 RNA SPEC QL NAA+PROBE: NOT DETECTED
HPIV4 RNA SPEC QL NAA+PROBE: NOT DETECTED
HYALINE CASTS URNS QL MICRO: ABNORMAL /LPF (ref 0–2)
IMM GRANULOCYTES # BLD AUTO: 0 K/UL (ref 0–0.04)
IMM GRANULOCYTES # BLD AUTO: 0.1 K/UL (ref 0–0.04)
IMM GRANULOCYTES NFR BLD AUTO: 0 % (ref 0–0.5)
IMM GRANULOCYTES NFR BLD AUTO: 1 % (ref 0–0.5)
INR PPP: 1.1 (ref 0.9–1.1)
KETONES UR QL STRIP.AUTO: 15 MG/DL
LACTATE SERPL-SCNC: 1.1 MMOL/L (ref 0.4–2)
LDH FLD L TO P-CCNC: 61 U/L
LDH SERPL L TO P-CCNC: 115 U/L (ref 85–241)
LEUKOCYTE ESTERASE UR QL STRIP.AUTO: ABNORMAL
LYMPHOCYTES # BLD: 0.4 K/UL (ref 0.8–3.5)
LYMPHOCYTES # BLD: 0.8 K/UL (ref 0.8–3.5)
LYMPHOCYTES # BLD: 0.8 K/UL (ref 0.8–3.5)
LYMPHOCYTES # BLD: 1 K/UL (ref 0.8–3.5)
LYMPHOCYTES # BLD: 1.1 K/UL (ref 0.8–3.5)
LYMPHOCYTES NFR BLD: 13 % (ref 12–49)
LYMPHOCYTES NFR BLD: 13 % (ref 12–49)
LYMPHOCYTES NFR BLD: 15 % (ref 12–49)
LYMPHOCYTES NFR BLD: 27 % (ref 12–49)
LYMPHOCYTES NFR BLD: 6 % (ref 12–49)
LYMPHOCYTES NFR FLD: 62 %
M PNEUMO DNA SPEC QL NAA+PROBE: NOT DETECTED
MAGNESIUM SERPL-MCNC: 2.1 MG/DL (ref 1.6–2.4)
MAGNESIUM SERPL-MCNC: 2.1 MG/DL (ref 1.6–2.4)
MAGNESIUM SERPL-MCNC: 2.2 MG/DL (ref 1.6–2.4)
MAGNESIUM SERPL-MCNC: 2.4 MG/DL (ref 1.6–2.4)
MCH RBC QN AUTO: 24.3 PG (ref 26–34)
MCH RBC QN AUTO: 24.5 PG (ref 26–34)
MCH RBC QN AUTO: 24.7 PG (ref 26–34)
MCH RBC QN AUTO: 24.8 PG (ref 26–34)
MCH RBC QN AUTO: 24.9 PG (ref 26–34)
MCH RBC QN AUTO: 25 PG (ref 26–34)
MCHC RBC AUTO-ENTMCNC: 28.6 G/DL (ref 30–36.5)
MCHC RBC AUTO-ENTMCNC: 29.2 G/DL (ref 30–36.5)
MCHC RBC AUTO-ENTMCNC: 29.4 G/DL (ref 30–36.5)
MCHC RBC AUTO-ENTMCNC: 29.5 G/DL (ref 30–36.5)
MCHC RBC AUTO-ENTMCNC: 29.7 G/DL (ref 30–36.5)
MCHC RBC AUTO-ENTMCNC: 29.8 G/DL (ref 30–36.5)
MCHC RBC AUTO-ENTMCNC: 30.3 G/DL (ref 30–36.5)
MCHC RBC AUTO-ENTMCNC: 30.3 G/DL (ref 30–36.5)
MCV RBC AUTO: 81.9 FL (ref 80–99)
MCV RBC AUTO: 82.3 FL (ref 80–99)
MCV RBC AUTO: 82.4 FL (ref 80–99)
MCV RBC AUTO: 82.5 FL (ref 80–99)
MCV RBC AUTO: 84 FL (ref 80–99)
MCV RBC AUTO: 84.3 FL (ref 80–99)
MCV RBC AUTO: 84.6 FL (ref 80–99)
MCV RBC AUTO: 86.5 FL (ref 80–99)
MONOCYTES # BLD: 0.6 K/UL (ref 0–1)
MONOCYTES # BLD: 0.7 K/UL (ref 0–1)
MONOCYTES # BLD: 0.7 K/UL (ref 0–1)
MONOCYTES # BLD: 0.8 K/UL (ref 0–1)
MONOCYTES # BLD: 0.8 K/UL (ref 0–1)
MONOCYTES NFR BLD: 10 % (ref 5–13)
MONOCYTES NFR BLD: 12 % (ref 5–13)
MONOCYTES NFR BLD: 15 % (ref 5–13)
MONOCYTES NFR BLD: 18 % (ref 5–13)
MONOCYTES NFR BLD: 9 % (ref 5–13)
MONOS+MACROS NFR FLD: 38 %
NEUTS SEG # BLD: 2.2 K/UL (ref 1.8–8)
NEUTS SEG # BLD: 3.6 K/UL (ref 1.8–8)
NEUTS SEG # BLD: 4.6 K/UL (ref 1.8–8)
NEUTS SEG # BLD: 5.7 K/UL (ref 1.8–8)
NEUTS SEG # BLD: 5.9 K/UL (ref 1.8–8)
NEUTS SEG NFR BLD: 52 % (ref 32–75)
NEUTS SEG NFR BLD: 67 % (ref 32–75)
NEUTS SEG NFR BLD: 73 % (ref 32–75)
NEUTS SEG NFR BLD: 75 % (ref 32–75)
NEUTS SEG NFR BLD: 85 % (ref 32–75)
NITRITE UR QL STRIP.AUTO: NEGATIVE
NRBC # BLD: 0 K/UL (ref 0–0.01)
NRBC BLD-RTO: 0 PER 100 WBC
NUC CELL # FLD: 458 /CU MM
PCO2 BLDA: 62 MMHG (ref 35–45)
PH BLDA: 7.38 [PH] (ref 7.35–7.45)
PH UR STRIP: 6.5 [PH] (ref 5–8)
PHOSPHATE SERPL-MCNC: 2.7 MG/DL (ref 2.6–4.7)
PLATELET # BLD AUTO: 208 K/UL (ref 150–400)
PLATELET # BLD AUTO: 221 K/UL (ref 150–400)
PLATELET # BLD AUTO: 229 K/UL (ref 150–400)
PLATELET # BLD AUTO: 230 K/UL (ref 150–400)
PLATELET # BLD AUTO: 239 K/UL (ref 150–400)
PLATELET # BLD AUTO: 239 K/UL (ref 150–400)
PLATELET # BLD AUTO: 246 K/UL (ref 150–400)
PLATELET # BLD AUTO: 263 K/UL (ref 150–400)
PMV BLD AUTO: 8.5 FL (ref 8.9–12.9)
PMV BLD AUTO: 8.6 FL (ref 8.9–12.9)
PMV BLD AUTO: 8.7 FL (ref 8.9–12.9)
PMV BLD AUTO: 8.8 FL (ref 8.9–12.9)
PMV BLD AUTO: 8.9 FL (ref 8.9–12.9)
PMV BLD AUTO: 9.1 FL (ref 8.9–12.9)
PMV BLD AUTO: 9.2 FL (ref 8.9–12.9)
PMV BLD AUTO: 9.3 FL (ref 8.9–12.9)
PO2 BLDA: 123 MMHG (ref 80–100)
POTASSIUM SERPL-SCNC: 3.7 MMOL/L (ref 3.5–5.1)
POTASSIUM SERPL-SCNC: 4 MMOL/L (ref 3.5–5.1)
POTASSIUM SERPL-SCNC: 4 MMOL/L (ref 3.5–5.1)
POTASSIUM SERPL-SCNC: 4.1 MMOL/L (ref 3.5–5.1)
POTASSIUM SERPL-SCNC: 4.1 MMOL/L (ref 3.5–5.1)
POTASSIUM SERPL-SCNC: 4.2 MMOL/L (ref 3.5–5.1)
POTASSIUM SERPL-SCNC: 4.8 MMOL/L (ref 3.5–5.1)
PROCALCITONIN SERPL-MCNC: 0.18 NG/ML
PROCALCITONIN SERPL-MCNC: <0.05 NG/ML
PROT FLD-MCNC: 3.6 G/DL
PROT SERPL-MCNC: 7 G/DL (ref 6.4–8.2)
PROT SERPL-MCNC: 7.9 G/DL (ref 6.4–8.2)
PROT SERPL-MCNC: 8 G/DL (ref 6.4–8.2)
PROT UR STRIP-MCNC: 30 MG/DL
PROTHROMBIN TIME: 11.8 SEC (ref 9–11.1)
Q-T INTERVAL, ECG07: 296 MS
Q-T INTERVAL, ECG07: 342 MS
QRS DURATION, ECG06: 68 MS
QRS DURATION, ECG06: 68 MS
QTC CALCULATION (BEZET), ECG08: 347 MS
QTC CALCULATION (BEZET), ECG08: 435 MS
RBC # BLD AUTO: 3.08 M/UL (ref 4.1–5.7)
RBC # BLD AUTO: 3.22 M/UL (ref 4.1–5.7)
RBC # BLD AUTO: 3.25 M/UL (ref 4.1–5.7)
RBC # BLD AUTO: 3.59 M/UL (ref 4.1–5.7)
RBC # BLD AUTO: 3.7 M/UL (ref 4.1–5.7)
RBC # BLD AUTO: 3.75 M/UL (ref 4.1–5.7)
RBC # BLD AUTO: 3.77 M/UL (ref 4.1–5.7)
RBC # BLD AUTO: 4.08 M/UL (ref 4.1–5.7)
RBC # FLD: >100 /CU MM
RBC #/AREA URNS HPF: ABNORMAL /HPF (ref 0–5)
RBC MORPH BLD: ABNORMAL
RSV RNA SPEC QL NAA+PROBE: NOT DETECTED
RV+EV RNA SPEC QL NAA+PROBE: NOT DETECTED
SAMPLES BEING HELD,HOLD: NORMAL
SAMPLES BEING HELD,HOLD: NORMAL
SAO2 % BLD: 98 % (ref 92–97)
SAO2% DEVICE SAO2% SENSOR NAME: ABNORMAL
SARS-COV-2 PCR, COVPCR: NOT DETECTED
SERVICE CMNT-IMP: NORMAL
SODIUM SERPL-SCNC: 137 MMOL/L (ref 136–145)
SODIUM SERPL-SCNC: 138 MMOL/L (ref 136–145)
SODIUM SERPL-SCNC: 139 MMOL/L (ref 136–145)
SODIUM SERPL-SCNC: 140 MMOL/L (ref 136–145)
SODIUM SERPL-SCNC: 141 MMOL/L (ref 136–145)
SOURCE, COVRS: NORMAL
SP GR UR REFRACTOMETRY: 1.02 (ref 1–1.03)
SPECIMEN SITE: ABNORMAL
SPECIMEN SOURCE FLD: ABNORMAL
SPECIMEN SOURCE FLD: NORMAL
THERAPEUTIC RANGE,PTTT: ABNORMAL SECS (ref 58–77)
TROPONIN-HIGH SENSITIVITY: 13 NG/L (ref 0–76)
TROPONIN-HIGH SENSITIVITY: 50 NG/L (ref 0–76)
TSH SERPL DL<=0.05 MIU/L-ACNC: 2.63 UIU/ML (ref 0.36–3.74)
UR CULT HOLD, URHOLD: NORMAL
UROBILINOGEN UR QL STRIP.AUTO: 1 EU/DL (ref 0.2–1)
VENTRICULAR RATE, ECG03: 130 BPM
VENTRICULAR RATE, ECG03: 62 BPM
WBC # BLD AUTO: 4 K/UL (ref 4.1–11.1)
WBC # BLD AUTO: 4 K/UL (ref 4.1–11.1)
WBC # BLD AUTO: 4.5 K/UL (ref 4.1–11.1)
WBC # BLD AUTO: 5.4 K/UL (ref 4.1–11.1)
WBC # BLD AUTO: 6.2 K/UL (ref 4.1–11.1)
WBC # BLD AUTO: 6.5 K/UL (ref 4.1–11.1)
WBC # BLD AUTO: 6.7 K/UL (ref 4.1–11.1)
WBC # BLD AUTO: 7.9 K/UL (ref 4.1–11.1)
WBC MORPH BLD: ABNORMAL
WBC URNS QL MICRO: ABNORMAL /HPF (ref 0–4)

## 2022-01-01 PROCEDURE — 65660000000 HC RM CCU STEPDOWN

## 2022-01-01 PROCEDURE — 85379 FIBRIN DEGRADATION QUANT: CPT

## 2022-01-01 PROCEDURE — 80048 BASIC METABOLIC PNL TOTAL CA: CPT

## 2022-01-01 PROCEDURE — 84100 ASSAY OF PHOSPHORUS: CPT

## 2022-01-01 PROCEDURE — 99223 1ST HOSP IP/OBS HIGH 75: CPT | Performed by: PHYSICAL MEDICINE & REHABILITATION

## 2022-01-01 PROCEDURE — 94761 N-INVAS EAR/PLS OXIMETRY MLT: CPT

## 2022-01-01 PROCEDURE — 97530 THERAPEUTIC ACTIVITIES: CPT

## 2022-01-01 PROCEDURE — 70450 CT HEAD/BRAIN W/O DYE: CPT

## 2022-01-01 PROCEDURE — 74011250636 HC RX REV CODE- 250/636: Performed by: INTERNAL MEDICINE

## 2022-01-01 PROCEDURE — 32555 ASPIRATE PLEURA W/ IMAGING: CPT

## 2022-01-01 PROCEDURE — G0299 HHS/HOSPICE OF RN EA 15 MIN: HCPCS

## 2022-01-01 PROCEDURE — 36600 WITHDRAWAL OF ARTERIAL BLOOD: CPT

## 2022-01-01 PROCEDURE — 74011250637 HC RX REV CODE- 250/637: Performed by: PSYCHIATRY & NEUROLOGY

## 2022-01-01 PROCEDURE — 84484 ASSAY OF TROPONIN QUANT: CPT

## 2022-01-01 PROCEDURE — 2709999900 HC NON-CHARGEABLE SUPPLY

## 2022-01-01 PROCEDURE — 97163 PT EVAL HIGH COMPLEX 45 MIN: CPT

## 2022-01-01 PROCEDURE — 83880 ASSAY OF NATRIURETIC PEPTIDE: CPT

## 2022-01-01 PROCEDURE — 71045 X-RAY EXAM CHEST 1 VIEW: CPT

## 2022-01-01 PROCEDURE — 74011250637 HC RX REV CODE- 250/637: Performed by: INTERNAL MEDICINE

## 2022-01-01 PROCEDURE — 97535 SELF CARE MNGMENT TRAINING: CPT

## 2022-01-01 PROCEDURE — 0651 HSPC ROUTINE HOME CARE

## 2022-01-01 PROCEDURE — 88305 TISSUE EXAM BY PATHOLOGIST: CPT

## 2022-01-01 PROCEDURE — 0W9B3ZZ DRAINAGE OF LEFT PLEURAL CAVITY, PERCUTANEOUS APPROACH: ICD-10-PCS | Performed by: RADIOLOGY

## 2022-01-01 PROCEDURE — HOSPICE MEDICATION HC HH HOSPICE MEDICATION

## 2022-01-01 PROCEDURE — 97165 OT EVAL LOW COMPLEX 30 MIN: CPT

## 2022-01-01 PROCEDURE — 74011000636 HC RX REV CODE- 636: Performed by: RADIOLOGY

## 2022-01-01 PROCEDURE — 83735 ASSAY OF MAGNESIUM: CPT

## 2022-01-01 PROCEDURE — 36415 COLL VENOUS BLD VENIPUNCTURE: CPT

## 2022-01-01 PROCEDURE — 84145 PROCALCITONIN (PCT): CPT

## 2022-01-01 PROCEDURE — 99233 SBSQ HOSP IP/OBS HIGH 50: CPT | Performed by: PHYSICAL MEDICINE & REHABILITATION

## 2022-01-01 PROCEDURE — 82728 ASSAY OF FERRITIN: CPT

## 2022-01-01 PROCEDURE — 77010033678 HC OXYGEN DAILY

## 2022-01-01 PROCEDURE — 0202U NFCT DS 22 TRGT SARS-COV-2: CPT

## 2022-01-01 PROCEDURE — 70551 MRI BRAIN STEM W/O DYE: CPT

## 2022-01-01 PROCEDURE — 93005 ELECTROCARDIOGRAM TRACING: CPT

## 2022-01-01 PROCEDURE — 74011000250 HC RX REV CODE- 250: Performed by: INTERNAL MEDICINE

## 2022-01-01 PROCEDURE — 85025 COMPLETE CBC W/AUTO DIFF WBC: CPT

## 2022-01-01 PROCEDURE — 82945 GLUCOSE OTHER FLUID: CPT

## 2022-01-01 PROCEDURE — 99232 SBSQ HOSP IP/OBS MODERATE 35: CPT | Performed by: PHYSICAL MEDICINE & REHABILITATION

## 2022-01-01 PROCEDURE — 87635 SARS-COV-2 COVID-19 AMP PRB: CPT

## 2022-01-01 PROCEDURE — 80053 COMPREHEN METABOLIC PANEL: CPT

## 2022-01-01 PROCEDURE — 85610 PROTHROMBIN TIME: CPT

## 2022-01-01 PROCEDURE — 87015 SPECIMEN INFECT AGNT CONCNTJ: CPT

## 2022-01-01 PROCEDURE — 97116 GAIT TRAINING THERAPY: CPT

## 2022-01-01 PROCEDURE — 88112 CYTOPATH CELL ENHANCE TECH: CPT

## 2022-01-01 PROCEDURE — 99285 EMERGENCY DEPT VISIT HI MDM: CPT

## 2022-01-01 PROCEDURE — 84443 ASSAY THYROID STIM HORMONE: CPT

## 2022-01-01 PROCEDURE — 86140 C-REACTIVE PROTEIN: CPT

## 2022-01-01 PROCEDURE — 85027 COMPLETE CBC AUTOMATED: CPT

## 2022-01-01 PROCEDURE — 65270000029 HC RM PRIVATE

## 2022-01-01 PROCEDURE — 74011000250 HC RX REV CODE- 250: Performed by: EMERGENCY MEDICINE

## 2022-01-01 PROCEDURE — 3336500001 HSPC ELECTION

## 2022-01-01 PROCEDURE — G0155 HHCP-SVS OF CSW,EA 15 MIN: HCPCS

## 2022-01-01 PROCEDURE — 82565 ASSAY OF CREATININE: CPT

## 2022-01-01 PROCEDURE — 83615 LACTATE (LD) (LDH) ENZYME: CPT

## 2022-01-01 PROCEDURE — 81001 URINALYSIS AUTO W/SCOPE: CPT

## 2022-01-01 PROCEDURE — 99233 SBSQ HOSP IP/OBS HIGH 50: CPT | Performed by: PSYCHIATRY & NEUROLOGY

## 2022-01-01 PROCEDURE — 71046 X-RAY EXAM CHEST 2 VIEWS: CPT

## 2022-01-01 PROCEDURE — 83605 ASSAY OF LACTIC ACID: CPT

## 2022-01-01 PROCEDURE — 82803 BLOOD GASES ANY COMBINATION: CPT

## 2022-01-01 PROCEDURE — 87040 BLOOD CULTURE FOR BACTERIA: CPT

## 2022-01-01 PROCEDURE — 84157 ASSAY OF PROTEIN OTHER: CPT

## 2022-01-01 PROCEDURE — 85384 FIBRINOGEN ACTIVITY: CPT

## 2022-01-01 PROCEDURE — 97161 PT EVAL LOW COMPLEX 20 MIN: CPT

## 2022-01-01 PROCEDURE — 74011250636 HC RX REV CODE- 250/636: Performed by: EMERGENCY MEDICINE

## 2022-01-01 PROCEDURE — 95816 EEG AWAKE AND DROWSY: CPT | Performed by: INTERNAL MEDICINE

## 2022-01-01 PROCEDURE — 85730 THROMBOPLASTIN TIME PARTIAL: CPT

## 2022-01-01 PROCEDURE — 74011250637 HC RX REV CODE- 250/637: Performed by: PHYSICAL MEDICINE & REHABILITATION

## 2022-01-01 PROCEDURE — 71275 CT ANGIOGRAPHY CHEST: CPT

## 2022-01-01 PROCEDURE — 96374 THER/PROPH/DIAG INJ IV PUSH: CPT

## 2022-01-01 PROCEDURE — 77030032034 HC SYS EVAC ASEPT DISP BBMI -B

## 2022-01-01 PROCEDURE — 77030040393 HC DRSG OPTIFOAM GENT MDII -B

## 2022-01-01 PROCEDURE — 99222 1ST HOSP IP/OBS MODERATE 55: CPT | Performed by: PSYCHIATRY & NEUROLOGY

## 2022-01-01 PROCEDURE — 3331090004 HSPC SERVICE INTENSITY ADD-ON

## 2022-01-01 PROCEDURE — 74011250637 HC RX REV CODE- 250/637: Performed by: EMERGENCY MEDICINE

## 2022-01-01 PROCEDURE — 89050 BODY FLUID CELL COUNT: CPT

## 2022-01-01 PROCEDURE — 95819 EEG AWAKE AND ASLEEP: CPT | Performed by: PSYCHIATRY & NEUROLOGY

## 2022-01-01 PROCEDURE — 87205 SMEAR GRAM STAIN: CPT

## 2022-01-01 RX ORDER — ACETAMINOPHEN 500 MG
1000 TABLET ORAL 2 TIMES DAILY
COMMUNITY
End: 2022-01-01

## 2022-01-01 RX ORDER — ACETAMINOPHEN 650 MG/1
650 SUPPOSITORY RECTAL
Status: DISCONTINUED | OUTPATIENT
Start: 2022-01-01 | End: 2022-01-01 | Stop reason: HOSPADM

## 2022-01-01 RX ORDER — CLONAZEPAM 0.5 MG/1
0.5 TABLET ORAL
Status: DISCONTINUED | OUTPATIENT
Start: 2022-01-01 | End: 2022-01-01

## 2022-01-01 RX ORDER — ONDANSETRON 4 MG/1
4 TABLET, ORALLY DISINTEGRATING ORAL
Status: DISCONTINUED | OUTPATIENT
Start: 2022-01-01 | End: 2022-01-01 | Stop reason: HOSPADM

## 2022-01-01 RX ORDER — MIDODRINE HYDROCHLORIDE 5 MG/1
2.5 TABLET ORAL 3 TIMES DAILY
Status: DISCONTINUED | OUTPATIENT
Start: 2022-01-01 | End: 2022-01-01 | Stop reason: HOSPADM

## 2022-01-01 RX ORDER — CARBIDOPA AND LEVODOPA 25; 100 MG/1; MG/1
1.5 TABLET ORAL 4 TIMES DAILY
Status: DISCONTINUED | OUTPATIENT
Start: 2022-01-01 | End: 2022-01-01 | Stop reason: HOSPADM

## 2022-01-01 RX ORDER — KETOROLAC TROMETHAMINE 30 MG/ML
15 INJECTION, SOLUTION INTRAMUSCULAR; INTRAVENOUS
Status: COMPLETED | OUTPATIENT
Start: 2022-01-01 | End: 2022-01-01

## 2022-01-01 RX ORDER — ACETAMINOPHEN 325 MG/1
650 TABLET ORAL
Status: DISCONTINUED | OUTPATIENT
Start: 2022-01-01 | End: 2022-01-01 | Stop reason: HOSPADM

## 2022-01-01 RX ORDER — SODIUM CHLORIDE 0.9 % (FLUSH) 0.9 %
5-40 SYRINGE (ML) INJECTION EVERY 8 HOURS
Status: DISCONTINUED | OUTPATIENT
Start: 2022-01-01 | End: 2022-01-01 | Stop reason: HOSPADM

## 2022-01-01 RX ORDER — DEXAMETHASONE SODIUM PHOSPHATE 4 MG/ML
10 INJECTION, SOLUTION INTRA-ARTICULAR; INTRALESIONAL; INTRAMUSCULAR; INTRAVENOUS; SOFT TISSUE ONCE
Status: COMPLETED | OUTPATIENT
Start: 2022-01-01 | End: 2022-01-01

## 2022-01-01 RX ORDER — SODIUM CHLORIDE 0.9 % (FLUSH) 0.9 %
5-10 SYRINGE (ML) INJECTION AS NEEDED
Status: DISCONTINUED | OUTPATIENT
Start: 2022-01-01 | End: 2022-01-01 | Stop reason: HOSPADM

## 2022-01-01 RX ORDER — DIGOXIN 125 MCG
0.25 TABLET ORAL DAILY
Status: DISCONTINUED | OUTPATIENT
Start: 2022-01-01 | End: 2022-01-01 | Stop reason: HOSPADM

## 2022-01-01 RX ORDER — SODIUM CHLORIDE 0.9 % (FLUSH) 0.9 %
5-40 SYRINGE (ML) INJECTION AS NEEDED
Status: DISCONTINUED | OUTPATIENT
Start: 2022-01-01 | End: 2022-01-01 | Stop reason: HOSPADM

## 2022-01-01 RX ORDER — POLYETHYLENE GLYCOL 3350 17 G/17G
17 POWDER, FOR SOLUTION ORAL DAILY PRN
Status: DISCONTINUED | OUTPATIENT
Start: 2022-01-01 | End: 2022-01-01 | Stop reason: HOSPADM

## 2022-01-01 RX ORDER — FUROSEMIDE 10 MG/ML
40 INJECTION INTRAMUSCULAR; INTRAVENOUS ONCE
Status: COMPLETED | OUTPATIENT
Start: 2022-01-01 | End: 2022-01-01

## 2022-01-01 RX ORDER — ONDANSETRON 2 MG/ML
4 INJECTION INTRAMUSCULAR; INTRAVENOUS
Status: DISCONTINUED | OUTPATIENT
Start: 2022-01-01 | End: 2022-01-01 | Stop reason: HOSPADM

## 2022-01-01 RX ORDER — LIDOCAINE HYDROCHLORIDE 10 MG/ML
10 INJECTION, SOLUTION EPIDURAL; INFILTRATION; INTRACAUDAL; PERINEURAL
Status: DISCONTINUED | OUTPATIENT
Start: 2022-01-01 | End: 2022-01-01

## 2022-01-01 RX ORDER — FUROSEMIDE 40 MG/1
40 TABLET ORAL DAILY
COMMUNITY
End: 2022-01-01

## 2022-01-01 RX ORDER — DIGOXIN 250 MCG
0.25 TABLET ORAL DAILY
COMMUNITY
Start: 2022-01-01

## 2022-01-01 RX ORDER — IBUPROFEN 400 MG/1
400 TABLET ORAL ONCE
Status: COMPLETED | OUTPATIENT
Start: 2022-01-01 | End: 2022-01-01

## 2022-01-01 RX ORDER — HYDRALAZINE HYDROCHLORIDE 20 MG/ML
20 INJECTION INTRAMUSCULAR; INTRAVENOUS
Status: DISCONTINUED | OUTPATIENT
Start: 2022-01-01 | End: 2022-01-01 | Stop reason: HOSPADM

## 2022-01-01 RX ORDER — CLONAZEPAM 0.5 MG/1
0.25 TABLET ORAL
Status: DISCONTINUED | OUTPATIENT
Start: 2022-01-01 | End: 2022-01-01 | Stop reason: HOSPADM

## 2022-01-01 RX ORDER — SODIUM CHLORIDE 9 MG/ML
75 INJECTION, SOLUTION INTRAVENOUS CONTINUOUS
Status: DISCONTINUED | OUTPATIENT
Start: 2022-01-01 | End: 2022-01-01

## 2022-01-01 RX ORDER — LIDOCAINE HYDROCHLORIDE 10 MG/ML
10 INJECTION, SOLUTION EPIDURAL; INFILTRATION; INTRACAUDAL; PERINEURAL
Status: COMPLETED | OUTPATIENT
Start: 2022-01-01 | End: 2022-01-01

## 2022-01-01 RX ORDER — ACETAMINOPHEN 500 MG
1000 TABLET ORAL ONCE
Status: COMPLETED | OUTPATIENT
Start: 2022-01-01 | End: 2022-01-01

## 2022-01-01 RX ADMIN — CARBIDOPA AND LEVODOPA 1.5 TABLET: 25; 100 TABLET ORAL at 10:05

## 2022-01-01 RX ADMIN — APIXABAN 5 MG: 5 TABLET, FILM COATED ORAL at 18:24

## 2022-01-01 RX ADMIN — SODIUM CHLORIDE, PRESERVATIVE FREE 10 ML: 5 INJECTION INTRAVENOUS at 06:00

## 2022-01-01 RX ADMIN — APIXABAN 5 MG: 5 TABLET, FILM COATED ORAL at 10:32

## 2022-01-01 RX ADMIN — SODIUM CHLORIDE, PRESERVATIVE FREE 10 ML: 5 INJECTION INTRAVENOUS at 18:57

## 2022-01-01 RX ADMIN — CARBIDOPA AND LEVODOPA 1.5 TABLET: 25; 100 TABLET ORAL at 21:14

## 2022-01-01 RX ADMIN — CARBIDOPA AND LEVODOPA 1.5 TABLET: 25; 100 TABLET ORAL at 13:35

## 2022-01-01 RX ADMIN — CARBIDOPA AND LEVODOPA 1.5 TABLET: 25; 100 TABLET ORAL at 21:37

## 2022-01-01 RX ADMIN — CARBIDOPA AND LEVODOPA 1.5 TABLET: 25; 100 TABLET ORAL at 21:31

## 2022-01-01 RX ADMIN — DIGOXIN 0.25 MG: 125 TABLET ORAL at 09:53

## 2022-01-01 RX ADMIN — CARBIDOPA AND LEVODOPA 1.5 TABLET: 25; 100 TABLET ORAL at 17:38

## 2022-01-01 RX ADMIN — CARBIDOPA AND LEVODOPA 1.5 TABLET: 25; 100 TABLET ORAL at 22:05

## 2022-01-01 RX ADMIN — APIXABAN 5 MG: 5 TABLET, FILM COATED ORAL at 09:53

## 2022-01-01 RX ADMIN — CARBIDOPA AND LEVODOPA 1.5 TABLET: 25; 100 TABLET ORAL at 14:38

## 2022-01-01 RX ADMIN — CLONAZEPAM 0.25 MG: 0.5 TABLET ORAL at 20:34

## 2022-01-01 RX ADMIN — APIXABAN 5 MG: 5 TABLET, FILM COATED ORAL at 10:06

## 2022-01-01 RX ADMIN — ACETAMINOPHEN 650 MG: 325 TABLET ORAL at 14:54

## 2022-01-01 RX ADMIN — APIXABAN 5 MG: 5 TABLET, FILM COATED ORAL at 17:39

## 2022-01-01 RX ADMIN — CARBIDOPA AND LEVODOPA 1.5 TABLET: 25; 100 TABLET ORAL at 20:37

## 2022-01-01 RX ADMIN — CARBIDOPA AND LEVODOPA 1.5 TABLET: 25; 100 TABLET ORAL at 15:39

## 2022-01-01 RX ADMIN — IBUPROFEN 400 MG: 400 TABLET, FILM COATED ORAL at 10:37

## 2022-01-01 RX ADMIN — CARBIDOPA AND LEVODOPA 1.5 TABLET: 25; 100 TABLET ORAL at 20:04

## 2022-01-01 RX ADMIN — CARBIDOPA AND LEVODOPA 1.5 TABLET: 25; 100 TABLET ORAL at 09:28

## 2022-01-01 RX ADMIN — CARBIDOPA AND LEVODOPA 1.5 TABLET: 25; 100 TABLET ORAL at 15:20

## 2022-01-01 RX ADMIN — CARBIDOPA AND LEVODOPA 1.5 TABLET: 25; 100 TABLET ORAL at 17:45

## 2022-01-01 RX ADMIN — MIDODRINE HYDROCHLORIDE 2.5 MG: 5 TABLET ORAL at 18:12

## 2022-01-01 RX ADMIN — SODIUM CHLORIDE, PRESERVATIVE FREE 10 ML: 5 INJECTION INTRAVENOUS at 21:30

## 2022-01-01 RX ADMIN — CARBIDOPA AND LEVODOPA 1.5 TABLET: 25; 100 TABLET ORAL at 18:24

## 2022-01-01 RX ADMIN — ACETAMINOPHEN 1000 MG: 500 TABLET ORAL at 21:07

## 2022-01-01 RX ADMIN — SODIUM CHLORIDE, PRESERVATIVE FREE 10 ML: 5 INJECTION INTRAVENOUS at 14:23

## 2022-01-01 RX ADMIN — CARBIDOPA AND LEVODOPA 1.5 TABLET: 25; 100 TABLET ORAL at 17:06

## 2022-01-01 RX ADMIN — FUROSEMIDE 40 MG: 10 INJECTION, SOLUTION INTRAMUSCULAR; INTRAVENOUS at 09:23

## 2022-01-01 RX ADMIN — CARBIDOPA AND LEVODOPA 1.5 TABLET: 25; 100 TABLET ORAL at 13:52

## 2022-01-01 RX ADMIN — SODIUM CHLORIDE, PRESERVATIVE FREE 10 ML: 5 INJECTION INTRAVENOUS at 09:54

## 2022-01-01 RX ADMIN — CARBIDOPA AND LEVODOPA 1.5 TABLET: 25; 100 TABLET ORAL at 21:39

## 2022-01-01 RX ADMIN — CARBIDOPA AND LEVODOPA 1.5 TABLET: 25; 100 TABLET ORAL at 13:15

## 2022-01-01 RX ADMIN — SODIUM CHLORIDE, PRESERVATIVE FREE 10 ML: 5 INJECTION INTRAVENOUS at 20:37

## 2022-01-01 RX ADMIN — CARBIDOPA AND LEVODOPA 1.5 TABLET: 25; 100 TABLET ORAL at 18:56

## 2022-01-01 RX ADMIN — CARBIDOPA AND LEVODOPA 1.5 TABLET: 25; 100 TABLET ORAL at 09:14

## 2022-01-01 RX ADMIN — APIXABAN 5 MG: 5 TABLET, FILM COATED ORAL at 18:56

## 2022-01-01 RX ADMIN — SODIUM CHLORIDE, PRESERVATIVE FREE 10 ML: 5 INJECTION INTRAVENOUS at 17:46

## 2022-01-01 RX ADMIN — CARBIDOPA AND LEVODOPA 1.5 TABLET: 25; 100 TABLET ORAL at 09:53

## 2022-01-01 RX ADMIN — MIDODRINE HYDROCHLORIDE 2.5 MG: 5 TABLET ORAL at 15:39

## 2022-01-01 RX ADMIN — SODIUM CHLORIDE, PRESERVATIVE FREE 10 ML: 5 INJECTION INTRAVENOUS at 06:57

## 2022-01-01 RX ADMIN — MIDODRINE HYDROCHLORIDE 2.5 MG: 5 TABLET ORAL at 21:13

## 2022-01-01 RX ADMIN — DEXAMETHASONE SODIUM PHOSPHATE 10 MG: 4 INJECTION, SOLUTION INTRAMUSCULAR; INTRAVENOUS at 23:16

## 2022-01-01 RX ADMIN — SODIUM CHLORIDE, PRESERVATIVE FREE 10 ML: 5 INJECTION INTRAVENOUS at 05:20

## 2022-01-01 RX ADMIN — CLONAZEPAM 0.5 MG: 0.5 TABLET ORAL at 21:39

## 2022-01-01 RX ADMIN — LIDOCAINE HYDROCHLORIDE 10 ML: 10 INJECTION, SOLUTION EPIDURAL; INFILTRATION; INTRACAUDAL; PERINEURAL at 12:10

## 2022-01-01 RX ADMIN — CARBIDOPA AND LEVODOPA 1.5 TABLET: 25; 100 TABLET ORAL at 15:15

## 2022-01-01 RX ADMIN — CARBIDOPA AND LEVODOPA 1.5 TABLET: 25; 100 TABLET ORAL at 18:01

## 2022-01-01 RX ADMIN — APIXABAN 5 MG: 5 TABLET, FILM COATED ORAL at 09:28

## 2022-01-01 RX ADMIN — CARBIDOPA AND LEVODOPA 1.5 TABLET: 25; 100 TABLET ORAL at 13:27

## 2022-01-01 RX ADMIN — SODIUM CHLORIDE, PRESERVATIVE FREE 10 ML: 5 INJECTION INTRAVENOUS at 21:08

## 2022-01-01 RX ADMIN — APIXABAN 5 MG: 5 TABLET, FILM COATED ORAL at 18:01

## 2022-01-01 RX ADMIN — MIDODRINE HYDROCHLORIDE 2.5 MG: 5 TABLET ORAL at 21:08

## 2022-01-01 RX ADMIN — ACETAMINOPHEN 650 MG: 325 TABLET ORAL at 08:42

## 2022-01-01 RX ADMIN — SODIUM CHLORIDE, PRESERVATIVE FREE 10 ML: 5 INJECTION INTRAVENOUS at 14:42

## 2022-01-01 RX ADMIN — SODIUM CHLORIDE, PRESERVATIVE FREE 10 ML: 5 INJECTION INTRAVENOUS at 21:39

## 2022-01-01 RX ADMIN — DIGOXIN 0.25 MG: 125 TABLET ORAL at 10:43

## 2022-01-01 RX ADMIN — SODIUM CHLORIDE 75 ML/HR: 9 INJECTION, SOLUTION INTRAVENOUS at 23:15

## 2022-01-01 RX ADMIN — SODIUM CHLORIDE, PRESERVATIVE FREE 10 ML: 5 INJECTION INTRAVENOUS at 21:38

## 2022-01-01 RX ADMIN — DIGOXIN 0.25 MG: 125 TABLET ORAL at 09:28

## 2022-01-01 RX ADMIN — MIDODRINE HYDROCHLORIDE 2.5 MG: 5 TABLET ORAL at 08:22

## 2022-01-01 RX ADMIN — DIGOXIN 0.25 MG: 125 TABLET ORAL at 10:06

## 2022-01-01 RX ADMIN — SODIUM CHLORIDE, PRESERVATIVE FREE 10 ML: 5 INJECTION INTRAVENOUS at 15:15

## 2022-01-01 RX ADMIN — ACETAMINOPHEN 650 MG: 325 TABLET ORAL at 09:26

## 2022-01-01 RX ADMIN — SODIUM CHLORIDE, PRESERVATIVE FREE 10 ML: 5 INJECTION INTRAVENOUS at 06:19

## 2022-01-01 RX ADMIN — CARBIDOPA AND LEVODOPA 1.5 TABLET: 25; 100 TABLET ORAL at 21:08

## 2022-01-01 RX ADMIN — KETOROLAC TROMETHAMINE 15 MG: 30 INJECTION, SOLUTION INTRAMUSCULAR at 21:07

## 2022-01-01 RX ADMIN — APIXABAN 5 MG: 5 TABLET, FILM COATED ORAL at 09:14

## 2022-01-01 RX ADMIN — MIDODRINE HYDROCHLORIDE 2.5 MG: 5 TABLET ORAL at 09:14

## 2022-01-01 RX ADMIN — CARBIDOPA AND LEVODOPA 1.5 TABLET: 25; 100 TABLET ORAL at 10:43

## 2022-01-01 RX ADMIN — SODIUM CHLORIDE, PRESERVATIVE FREE 10 ML: 5 INJECTION INTRAVENOUS at 20:04

## 2022-01-01 RX ADMIN — CARBIDOPA AND LEVODOPA 1.5 TABLET: 25; 100 TABLET ORAL at 08:42

## 2022-01-01 RX ADMIN — APIXABAN 5 MG: 5 TABLET, FILM COATED ORAL at 18:12

## 2022-01-01 RX ADMIN — CARBIDOPA AND LEVODOPA 1.5 TABLET: 25; 100 TABLET ORAL at 18:12

## 2022-01-01 RX ADMIN — CARBIDOPA AND LEVODOPA 1.5 TABLET: 25; 100 TABLET ORAL at 17:59

## 2022-01-01 RX ADMIN — IOPAMIDOL 89 ML: 755 INJECTION, SOLUTION INTRAVENOUS at 23:11

## 2022-01-01 RX ADMIN — APIXABAN 5 MG: 5 TABLET, FILM COATED ORAL at 17:46

## 2022-01-01 RX ADMIN — APIXABAN 5 MG: 5 TABLET, FILM COATED ORAL at 17:59

## 2022-01-01 RX ADMIN — CLONAZEPAM 0.5 MG: 0.5 TABLET ORAL at 21:31

## 2022-01-01 RX ADMIN — CARBIDOPA AND LEVODOPA 1.5 TABLET: 25; 100 TABLET ORAL at 08:40

## 2022-01-01 RX ADMIN — MIDODRINE HYDROCHLORIDE 2.5 MG: 5 TABLET ORAL at 17:06

## 2022-01-01 RX ADMIN — APIXABAN 5 MG: 5 TABLET, FILM COATED ORAL at 10:44

## 2022-01-01 RX ADMIN — CARBIDOPA AND LEVODOPA 1.5 TABLET: 25; 100 TABLET ORAL at 08:22

## 2022-01-01 RX ADMIN — SODIUM CHLORIDE, PRESERVATIVE FREE 10 ML: 5 INJECTION INTRAVENOUS at 21:31

## 2022-01-01 RX ADMIN — SODIUM CHLORIDE, PRESERVATIVE FREE 10 ML: 5 INJECTION INTRAVENOUS at 22:04

## 2022-01-01 RX ADMIN — APIXABAN 5 MG: 5 TABLET, FILM COATED ORAL at 08:22

## 2022-01-01 RX ADMIN — CARBIDOPA AND LEVODOPA 1.5 TABLET: 25; 100 TABLET ORAL at 13:06

## 2022-01-06 NOTE — PROGRESS NOTES
Post Acute Facility Update     *The information contained in this note was received during a weekly care coordination call with the post acute facility*    Current Facility: [address] ([facility])  Anticipated Discharge Date: TBD    Facility Nursing Update: having issues with low BP, MD aware, some improvement  Facility Social Work Update: no current issues  Bundle Program Status: Patient is currently enrolled in the [facility] HF Bundle.  Bundle start date: 12/24/21  Bundle end date:3/24/2022    Upper Extremity Assistance: Stand by Assist - Presence and Cueing  Lower Extremity Assistance: Maximum Assistance  Bed Mobility: Minimal Assistance   Transfers: Minimal Assistance   Ambulation: Minimal Assistance   How far (in feet) is the patient ambulating? 8-65 ft inconsistant  Device Used: walker  Barriers to Discharge: TBD  Other: Parkinsons and Low BP    Maki Silva   BSN, RN  [place]

## 2022-01-12 NOTE — PROGRESS NOTES
Outgoing call made to [facility]. Spoke with [facility]. Introduced self and role. Confirms patient currently admitted to SNF. Transition of care outreach postponed for 14 days due to patient's discharge to SNF. Will continue to monitor patient's progress.

## 2022-01-12 NOTE — PROGRESS NOTES
Post Acute Facility Update     *The information contained in this note was received during a weekly care coordination call with the post acute facility*    Current Facility: [facility] ([facility])  Anticipated Discharge Date: TBD    Facility Nursing Update: BP stable, weight loss noted /poor intake, started Mirtazapine for depression/Wgt loss. Facility Social Work Update: No current social work update  Bundle Program Status: Congested Heart Failure ([facility])  Upper Extremity Assistance: Minimal Assistance   Lower Extremity Assistance: Maximum Assistance   Bed Mobility: Contact Guard Assist- hands on patient for balance  Transfers: Contact Guard Assist - hands on patient for balance   Ambulation: Contact Guard Assist - hands on patient for balance   How far (in feet) is the patient ambulating? 40 FT  Device Used: walker   Barriers to Discharge: TBD  Other: Inconsistent with progress.       Angela Valverde LPN Care Coordinator

## 2022-01-18 NOTE — PROGRESS NOTES
Post Acute Facility Update     *The information contained in this note was received during a weekly care coordination call with the post acute facility*    [facility]  Anticipated Discharge Date: 1/26/2022    Facility Nursing Update: Pt's BP stable  Facility Social Work Update: Plan for pt to discharge to home with wife and [place] on 1/26/22  Bundle Program Status: Patient is currently enrolled in the [facility] HF Bundle. Bundle start date: 12/24/21  Bundle end date: 3/24/22    Reviewed goals for LOS with facility: Yes     Cardiac Bundle recommendations reviewed with SNF team to include: pt is being weighed 3 x's daily having wt loss, on Eliquis and fall risk precautions   Upper Extremity Assistance: Stand by Assist - Presence and Cueing  Lower Extremity Assistance: Minimal Assistance   Bed Mobility: Contact Guard Assist - hands on patient for balance   Transfers: Contact Guard Assist - hands on patient for balance   Ambulation: Contact Guard Assist - hands on patient for balance   How far (in feet) is the patient ambulating? 100  Device Used: walker  Barriers to Discharge: would benefit from one more week of therapy, wife getting training from PT on ways to help prevent BP drops.     Naveed Gusman   BSN, RN  [facility]

## 2022-01-25 NOTE — Clinical Note
Hi Tia,   Mr Kamini Cesar should be discharging [facility] 1/27/2021 he is a bundle patient.    Thank you,   [place]

## 2022-01-25 NOTE — PROGRESS NOTES
Post Acute Facility Update     *The information contained in this note was received during a weekly care coordination call with the post acute facility*    Current Facility: [facility] (SNF)  Anticipated Discharge Date: 01/27/2022    Facility Nursing Update: No changes , DC home 1/27. Eliquis and midodrine. Will get  wife educated on interventions for BP drops  Facility Social Work Update: No current social work update. Bundle Program Status: Cardiac Bundle  Upper Extremity Assistance: Stand by Assist - Presence and Cueing  Lower Extremity Assistance: Maximum Assistance  Bed Mobility: Contact Guard Assist - hands on patient for balance   Transfers: Contact Guard Assist - hands on patient for balance   Ambulation: Contact Guard Assist - hands on patient for balance   How far (in feet) is the patient ambulating?  100 ft  Device Used: walker  Barriers to Discharge: TBD  Other: Hx of Parkinson inconsistent      Vera [place] LPN Care Coordinator

## 2022-01-28 NOTE — PROGRESS NOTES
[address] [place] [address] Dr Discharge Follow-Up      Date/Time:  2022 9:16 AM    Patient was admitted to [facility] on 2022 and discharged to [facility] on 2022. The patients discharge diagnosis was Acute respiratory failure with hypoxia. Patient was discharged on 2022 from [facility]. The discharge summary from the post acute facilty was not available at the time of outreach. Patient was contacted within 2 business days of discharge from the post acute facility. LPN Care Coordinator contacted the family by telephone to perform post hospital discharge follow up. Verified name and  with family as identifiers. Provided introduction to self, and explanation of the LPN Care Coordinator role. Patient received post acute facility discharge instructions. LPN Care Coordinator reviewed discharge instructions and red flags with family who verbalized understanding. Family given an opportunity to ask questions and does not have any further questions or concerns at this time. The family agrees to contact the PCP office for questions related to their healthcare. LPN Care Coordinator provided contact information for future reference. Advance Care Planning:   Does patient have an Advance Directive:  not on file; education provided     Home Health orders at discharge: PT, OT, [address]: [facility]  Date of initial visit: 2022    Durable Medical Equipment ordered at discharge: none  [address] received: n/a    Medication(s):   The post acute facility medication discharge list was not available for this call. Medication review was performed with family, who verbalizes understanding of administration of home medications. There were no barriers to obtaining medications identified at this time. [facility] follow up appointment(s): No future appointments. Non-[facility] follow up appointment(s):  Wife will call to make follow-up appointments with PCP, cardiologist and pulmonologist. Lyly Landa assistance with appointments.    [facility]:  information provided as a resource

## 2022-01-31 NOTE — PROGRESS NOTES
Care Transitions Follow Up Call    Challenges to be reviewed by the provider   Additional needs identified to be addressed with provider: yes  advance care planning- No ACP or HIPAA on EPIC     Is not on Ace/Arb or diuretic. Contains abnormal data HEMOGLOBIN A1C WITH EAG  Order: [id number]   Collected 2021 04:12     Status: Final result     Next appt: None     0 Result Notes     21 0412   Hemoglobin A1c 6.0 High          Contains abnormal data IRON PROFILE  Order: [id number]   Collected 2021 04:12     Status: Final result     Next appt: None     0 Result Notes     21 0412   Iron 23 Low     TIBC 207 Low     Iron % saturation 11 Low              Results for Durel Goodell (MRN [id number]) as of 2022 07:48   2021 04:12   TSH 3.78 (H)          Method of communication with provider : none    Care Transition Nurse (CTN) contacted the family Wife by telephone to follow up after admission on 21. Verified name and  with family as identifiers. Addressed changes since last contact: home health care- [facility]  medications- Not on Ace, Arb or dieretic  labs- As above  advance care planning- No ACP or HIPAA on file  PT- Seen 22  OT- To come this week  Follow up appointment completed? no.   Was follow up appointment scheduled within 7 days of discharge? no.     Heart Failure Note    Do you have a Scale:    yes   How often do you weigh:  Not daily we discussed why it is so important    Daily Weight- Not done today  Zone:(Pt Reported)  green     EF: 60-65% 22  Type of HF:   HFpEF     Cardiac Device present: none      Heart Failure Medications: Anticoagulant     Advance Care Planning:   Does patient have an Advance Directive:  health care decision makers updated    CTN reviewed discharge instructions, medical action plan and red flags with family and discussed any barriers to care and/or understanding of plan of care after discharge.  Discussed appropriate site of care based on symptoms and resources available to patient including: PCP and Specialist. The family agrees to contact the PCP office for questions related to their healthcare. Patients top risk factors for readmission: falls and medical condition-HF, anemia, Parkinson's, Afib   Interventions to address risk factors: Scheduled appointment with PCP-Wife calling and makeing an appt with PCP today, Scheduled appointment with Specialist-Wife is calling and making an appt with Pulm today and Assessment and support for treatment adherence and medication management-Afib, falls, HF, Parkinson's    [facility]): No future appointments. Non-[facility] follow up appointment(s): Wife calling to make an appt with PCP and Pulm    CTN provided contact information for future needs. Plan for follow-up call in 7-10 days based on severity of symptoms and risk factors. Plan for next call: symptom management-Dry cough, SOB on exertions, weak legs, self management-PT/OT, daily weights, take medication as directed. and follow up appointment-PCP and Pulm     Goals Addressed                 This Visit's Progress     Attends follow-up appointments as directed. 01/31/22    Patient wife is going to call and schedule an appt with PCP and Pulm today. Monitor status of these appt on  next call. Uzma Gayle MSN, RN, CCM / Care Transition Nurse / [phone]        Understands red flags post discharge. 01/31/22    Patient taking nap spoke with wife Frantz Ayala. Patient has dry cough, some SOB on exertions. Denies any pedal edema. Up with use of walker.  Has been seen by PT on 1/28, to be seen by OT this week.   Patient is on Tylenol, Elquis, Midorine and Mirtazapine only for medications.  Patient with chronic back pain from falls -takes Tylenol for this.  Patient has a scale does not weigh everyday, discussed why daily weights are important and what a 3 lb or 5 lb in 1 week weight gain means-call provider.   Monitor daily weight, SOB, cough, falls, activity status and PT status on next call.     Rosi Smith MSN, RN, CCM / Care Transition Nurse / [phone]

## 2022-02-06 PROBLEM — A41.9 SEPSIS (HCC): Status: ACTIVE | Noted: 2022-01-01

## 2022-02-06 PROBLEM — R50.9 FEVER: Status: ACTIVE | Noted: 2022-01-01

## 2022-02-06 PROBLEM — R53.1 WEAKNESS GENERALIZED: Status: ACTIVE | Noted: 2022-01-01

## 2022-02-06 PROBLEM — R00.0 SINUS TACHYCARDIA: Status: ACTIVE | Noted: 2022-01-01

## 2022-02-07 NOTE — PROGRESS NOTES
Reason for Admission:   Atrial fib with RVR,fever,sepsis,acute respiratory failure with hypoxia,CHF,chronic bilateral effusions,volume overload                    RUR Score:     17%             PCP: First and Last name:   Mouna Looney MD     Name of Practice:    Are you a current patient: Yes/No: yes   Approximate date of last visit:    Can you participate in a virtual visit if needed:     Do you (patient/family) have any concerns for transition/discharge? No,only that we would like the number for neurology @ [facility] as [facility] neurology is a hardship for us to drive to now,I have placed the number on pt.'s AVS              Plan for utilizing home health:   Pt and his wife both want a resumption of home PT and OT when discharged home    Current Advanced Directive/Advance Care Plan:  Full Code      Healthcare Decision Maker:               Primary Decision Maker: Barbra Mares - Spouse - [phone]    Secondary Decision Maker: Radha Mota - [phone]    Transition of Care Plan:          I met with pt and his wife as an introductory visit to begin future discharge discussions. Pt lives with his wife @ the address on demographics. At home,pt has a rolling walker and wheelchair. Wife has a list of personal caregivers @ home and states if necessary,wife will hire additional help if caring for her  becomes too cumbersome @ home. Wife states pt currently has home bandar for home PT/OT with [facility]. I will ask attending for resumption of home health orders for SN/PT/OT and will set this up for pt. Wife also utilizes [facility] when needed. Wife states tht when pt changes positions @ home,his blood pressure drops ad he becomes dizzy. Pt was in rehab @ [facility] for one month and was discharged from [facility] two weeks ago. Pt is adamant that he does not want to return to rehab and wife supports his decision. Wife states she can manage pt @ home and as stated before ,will hire assistance if she cannot. I will continue to follow pt for disposition needs.     Zuhair Walters

## 2022-02-07 NOTE — CONSULTS
Name: Shauna Akins: [address]   : 1935 Admit Date: 2022   Phone: [phone]  Room: [facility]/   PCP: Vannessa Rivera MD  MRN: [id number]   Date: 2022  Code: Full Code          Chart and notes reviewed. Data reviewed. I review the patient's current medications in the medical record at each encounter. I have evaluated and examined the patient. HPI:    8:27 AM       History was obtained from chart review. I was asked by Denzel Serrano MD to see Judi Guzman in consultation for a chief complaint of pleural effusions. History of Present Illness:  Mr. Dontrell Smith is an 82 yo gentleman with a history of CHF, afib on Elquis, chronic bilateral pleural effusions, Parkinson's Disease, and dementia who is admitted for weakness. He comes in with weakness. He was evaluated by [facility] and found to be hypoxic to 85% on RA. He has a history of chronic bilateral effusions. He is follwed by [facility] and December he had bilateral thoracentesis ( and ). He was then admitted to [facility] - and found to have bilateral effusions. As his effusions were stable to post-thora images at [facility] and the concern for reaccumulation due to underlying CHF and fluid overload, the decision was made not proceed with thora at that time. Has persistent effusions on CTA today, but again not significantly changed from post-thora volumes. Labs: WBC 4.0, Hgb 8.1, , cr 1.26, CRP 7.33, rapid COVID negative, RVP penidng    Images reviewed:  CTA 2022: negative for PE; moderate bilateral effusions (L>R) with associated atelectasis, not significantly increased from December      Past Medical History:   Diagnosis Date    Anemia     Atrial fibrillation ([address]     CHF (congestive heart failure), NYHA class I, chronic, diastolic (HCC)     has had to have lungs drained.     CKD (chronic kidney disease), stage III ([address]     Hx of completed stroke     right sided affected    Hypoalbuminemia     Orthostatic hypotension     Parkinson disease (HCC)     Proteinuria        Past Surgical History:   Procedure Laterality Date    HX CHOLECYSTECTOMY      HX KNEE ARTHROSCOPY Left        No family history on file. Social History     Tobacco Use    Smoking status: Not on file    Smokeless tobacco: Not on file   Substance Use Topics    Alcohol use: Not on file       No Known Allergies    Current Facility-Administered Medications   Medication Dose Route Frequency    sodium chloride (NS) flush 5-10 mL  5-10 mL IntraVENous PRN    acetaminophen (TYLENOL) tablet 650 mg  650 mg Oral Q6H PRN    Or    acetaminophen (TYLENOL) suppository 650 mg  650 mg Rectal Q6H PRN    polyethylene glycol (MIRALAX) packet 17 g  17 g Oral DAILY PRN    ondansetron (ZOFRAN ODT) tablet 4 mg  4 mg Oral Q8H PRN    Or    ondansetron (ZOFRAN) injection 4 mg  4 mg IntraVENous Q6H PRN         REVIEW OF SYSTEMS   12 point ROS negative except as stated in the HPI. Physical Exam:   Visit Vitals  BP (!) 156/94   Pulse 79   Temp 98.9 °F (37.2 °C)   Resp 19   Wt 80.8 kg (178 lb 2.1 oz)   SpO2 99%   BMI 24.64 kg/m²       General:  Asleep, opens eyes to voice but does not answer questions this morning   Head:  Normocephalic, without obvious abnormality   Eyes:  Conjunctivae/corneas clear. Nose: Nares normal. Septum midline. Throat: Lips, mucosa, and tongue normal.    Neck: Supple, symmetrical, trachea midline, no adenopathy. Lungs:   Clear to auscultation bilaterally but diminished in the bases bilaterally, respirations nonlabored   Chest wall:  No tenderness or deformity. Heart:  Regular rate and rhythm, S1, S2 normal, no murmur, click, rub or gallop. Abdomen:   Soft, non-tender. Bowel sounds normal.   Extremities: Extremities normal, atraumatic, 2+ BLE edema   Pulses: 2+ and symmetric all extremities.    Skin: Skin color, texture, turgor normal. No rashes or lesions       Neurologic: Grossly nonfocal Lab Results   Component Value Date/Time    Sodium 138 02/07/2022 05:12 AM    Potassium 4.8 02/07/2022 05:12 AM    Chloride 108 02/07/2022 05:12 AM    CO2 25 02/07/2022 05:12 AM    BUN 22 (H) 02/07/2022 05:12 AM    Creatinine 1.26 02/07/2022 05:12 AM    Glucose 163 (H) 02/07/2022 05:12 AM    Calcium 8.3 (L) 02/07/2022 05:12 AM    Magnesium 2.1 02/06/2022 08:47 PM    Phosphorus 3.7 12/21/2021 04:12 AM    Lactic acid 1.1 02/06/2022 08:47 PM       Lab Results   Component Value Date/Time    WBC 4.0 (L) 02/07/2022 05:12 AM    HGB 8.1 (L) 02/07/2022 05:12 AM    PLATELET 306 73/33/0733 05:12 AM    MCV 84.0 02/07/2022 05:12 AM       Lab Results   Component Value Date/Time    INR 1.1 02/06/2022 08:47 PM    aPTT 32.7 (H) 02/06/2022 08:47 PM    Alk. phosphatase 76 02/07/2022 05:12 AM    Protein, total 7.0 02/07/2022 05:12 AM    Albumin 2.6 (L) 02/07/2022 05:12 AM    Globulin 4.4 (H) 02/07/2022 05:12 AM       Lab Results   Component Value Date/Time    Iron 23 (L) 12/21/2021 04:12 AM    TIBC 207 (L) 12/21/2021 04:12 AM    Iron % saturation 11 (L) 12/21/2021 04:12 AM    Ferritin 287 12/21/2021 04:12 AM       Lab Results   Component Value Date/Time    C-Reactive protein 7.33 (H) 02/06/2022 08:47 PM    TSH 3.78 (H) 12/21/2021 04:12 AM        No results found for: PH, PHI, PCO2, PCO2I, PO2, PO2I, HCO3, HCO3I, FIO2, FIO2I    No results found for: CPK, RCK1, RCK2, RCK3, RCK4, CKNDX, CKND1, TROPT, TROIQ, BNPP, BNP     Lab Results   Component Value Date/Time    Culture result: NO GROWTH AFTER 9 HOURS 02/06/2022 08:59 PM    Culture result: NO GROWTH AFTER 9 HOURS 02/06/2022 08:47 PM    Culture result: MRSA NOT PRESENT 12/21/2021 05:12 AM    Culture result:  12/21/2021 05:12 AM     Screening of patient nares for MRSA is for surveillance purposes and, if positive, to facilitate isolation considerations in high risk settings.  It is not intended for automatic decolonization interventions per se as regimens are not sufficiently effective to warrant routine use. No results found for: TOXA1, RPR, HBCM, HBSAG, HAAB, HCAB1, HAAT, G6PD, CRYAC, HIVGT, HIVR, HIV1, HIV12, HIVPC, HIVRPI    No results found for: VANCT, CPK    Lab Results   Component Value Date/Time    Color DARK YELLOW 12/20/2021 08:56 PM    Appearance TURBID (A) 12/20/2021 08:56 PM    pH (UA) 5.0 12/20/2021 08:56 PM    Protein 100 (A) 12/20/2021 08:56 PM    Glucose Negative 12/20/2021 08:56 PM    Ketone TRACE (A) 12/20/2021 08:56 PM    Blood SMALL (A) 12/20/2021 08:56 PM    Urobilinogen 1.0 12/20/2021 08:56 PM    Nitrites Negative 12/20/2021 08:56 PM    Leukocyte Esterase Negative 12/20/2021 08:56 PM    WBC 0-4 12/20/2021 08:56 PM    RBC 5-10 12/20/2021 08:56 PM    Bacteria Negative 12/20/2021 08:56 PM       IMPRESSION  · Chronic bilateral effusions  · CHF  · Afib, chronically on Elquis  · Volume overload    PLAN  · Goal sats 88% or higher, satting 98% on 2L and can wean as tolerated  · Does not require urgent thoracentesis, fluid volumes are not significantly changed since prior admission which were compatible with levels seen post-thoracentesis. He is at high risk of reaccumulating fluid given his underlying heart failure. Will hold Eliquis in the even that fluid levels increase and could consider thora as early as Wednesday  · He would benefit from diuresis and will order a dose of lasix   · Cardiology consulted  · Follow-up RVP      Thank you for allowing us to participate in the care of this patient. We will be happy to follow along in his/her progress with you.     Lisandra Rivero MD

## 2022-02-07 NOTE — PROGRESS NOTES
Problem: Pressure Injury - Risk of  Goal: *Prevention of pressure injury  Description: Document Luisito Scale and appropriate interventions in the flowsheet.   Outcome: Progressing Towards Goal  Note: Pressure Injury Interventions:  Sensory Interventions: Minimize linen layers,Pad between skin to skin    Moisture Interventions: Apply protective barrier, creams and emollients,Limit adult briefs,Maintain skin hydration (lotion/cream),Minimize layers,Moisture barrier    Activity Interventions: Pressure redistribution bed/mattress(bed type),PT/OT evaluation    Mobility Interventions: HOB 30 degrees or less,Pressure redistribution bed/mattress (bed type),PT/OT evaluation    Nutrition Interventions: Document food/fluid/supplement intake    Friction and Shear Interventions: Apply protective barrier, creams and emollients,HOB 30 degrees or less,Minimize layers                Problem: Patient Education: Go to Patient Education Activity  Goal: Patient/Family Education  Outcome: Progressing Towards Goal

## 2022-02-07 NOTE — ED PROVIDER NOTES
50-year-old male with a history of congestive heart failure, atrial fibrillation, orthostatic hypotension and stroke with right-sided deficit presents with a chief complaint of weakness. At [facility] was called to the patient's home for complaint of weakness and more shaking than usual despite his history of Parkinson's disease. Patient had a fever and tachycardia at home and was found to have oxygen saturations of 85% on room air. He has been vaccinated. [facility]. Past Medical History:   Diagnosis Date    Congestive heart failure (CHF) ([address]     has had to have lungs drained.  Falls     Orthostatic hypotension     Stroke [facility])     right sided affected       Past Surgical History:   Procedure Laterality Date    HX CHOLECYSTECTOMY      HX KNEE ARTHROSCOPY Left          No family history on file. Social History     Socioeconomic History    Marital status:      Spouse name: Not on file    Number of children: Not on file    Years of education: Not on file    Highest education level: Not on file   Occupational History    Not on file   Tobacco Use    Smoking status: Not on file    Smokeless tobacco: Not on file   Substance and Sexual Activity    Alcohol use: Not on file    Drug use: Not on file    Sexual activity: Not on file   Other Topics Concern    Not on file   Social History Narrative    Not on file     Social Determinants of Health     Financial Resource Strain:     Difficulty of Paying Living Expenses: Not on file   Food Insecurity:     Worried About Running Out of Food in the Last Year: Not on file    Magui of Food in the Last Year: Not on file   Transportation Needs:     Lack of Transportation (Medical): Not on file    Lack of Transportation (Non-Medical):  Not on file   Physical Activity:     Days of Exercise per Week: Not on file    Minutes of Exercise per Session: Not on file   Stress:  Feeling of Stress : Not on file   Social Connections:     Frequency of Communication with Friends and Family: Not on file    Frequency of Social Gatherings with Friends and Family: Not on file    Attends Taoist Services: Not on file    Active Member of Clubs or Organizations: Not on file    Attends Club or Organization Meetings: Not on file    Marital Status: Not on file   Intimate Partner Violence:     Fear of Current or Ex-Partner: Not on file    Emotionally Abused: Not on file    Physically Abused: Not on file    Sexually Abused: Not on file   Housing Stability:     Unable to Pay for Housing in the Last Year: Not on file    Number of [place] in the Last Year: Not on file    Unstable Housing in the Last Year: Not on file         ALLERGIES: Patient has no known allergies. Review of Systems   Unable to perform ROS: Mental status change       There were no vitals filed for this visit. Physical Exam  Vitals and nursing note reviewed. Constitutional:       General: He is not in acute distress. Appearance: Normal appearance. He is not ill-appearing, toxic-appearing or diaphoretic. HENT:      Head: Normocephalic. Eyes:      Extraocular Movements: Extraocular movements intact. Cardiovascular:      Rate and Rhythm: Tachycardia present. Pulses: Normal pulses. Heart sounds: Normal heart sounds. Pulmonary:      Effort: Pulmonary effort is normal. No respiratory distress. Breath sounds: Rales present. Abdominal:      General: There is no distension. Tenderness: There is no abdominal tenderness. Musculoskeletal:         General: Normal range of motion. Cervical back: Normal range of motion. Skin:     General: Skin is warm and dry. Capillary Refill: Capillary refill takes less than 2 seconds. Neurological:      General: No focal deficit present. Mental Status: He is alert. Mental status is at baseline.    Psychiatric:         Mood and Affect: Mood normal.          MDM  Number of Diagnoses or Management Options  Acute respiratory failure with hypoxia (HCC)  Atrial fibrillation with rapid ventricular response (HCC)  Clinical diagnosis of COVID-19  Pleural effusion  Diagnosis management comments:       78-year-old male presents with shortness of breath. He was hypoxic on room air and placed on supplemental oxygen with improvement in his work of breathing and oxygen saturation. Patient initially tachycardic in A. fib with RVR. His rate improved after antipyretic administration. We will hold on IV fluids at this point given history of CHF. Covid is likely. Covid test is pending. White blood cell count is normal.  No antibiotics indicated at this time. Patient will be admitted to hospital medicine for further management.   Decadron given    EKG shows atrial fibrillation with rapid ventricular response at a rate of 130, otherwise normal intervals, normal axis, no ischemic changes          Perfect Serve Consult for Admission  9:37 PM    ED Room Number: ER08/08  Patient Name and age:  Michelle Holder 81 y.o.  male  Working Diagnosis: Acute respiratory failure with hypoxia ([address]  (primary encounter diagnosis)  Atrial fibrillation with rapid ventricular response ([facility] [address]  Pleural effusion  Clinical diagnosis of COVID-19    COVID-19 Suspicion:  yes  Sepsis present:  yes  Reassessment needed: no  Code Status:  Full Code  Readmission: yes  Isolation Requirements:  yes  Recommended Level of Care:  telemetry  Department:[facility] - [phone]  Other:  Heart rate improving    Total critical care time spent exclusive of procedures:  35 minutes         Amount and/or Complexity of Data Reviewed  Clinical lab tests: ordered and reviewed  Tests in the radiology section of CPT®: reviewed and ordered           Procedures

## 2022-02-07 NOTE — PROGRESS NOTES
Problem: Self Care Deficits Care Plan (Adult)  Goal: *Acute Goals and Plan of Care (Insert Text)  Description: FUNCTIONAL STATUS PRIOR TO ADMISSION: Patient was modified independent using a walker for functional mobility and needed some assistance for ADLs; Patient unable to clarify level of assist required just prior to admission. HOME SUPPORT: The patient lived with wife. Occupational Therapy Goals  Initiated 2/7/2022  1. Patient will perform grooming with modified independence within 7 day(s). 2.  Patient will perform upper body dressing and bathing with modified independence within 7 day(s). 3.  Patient will perform lower body dressing and bathing with supervision/setup within 7 day(s). 4.  Patient will perform toilet transfers with supervision using rolling walker within 7 day(s). 5.  Patient will perform all aspects of toileting with supervision/set-up within 7 day(s). 6.  Patient will participate in upper extremity therapeutic exercise/activities with modified independence for 10 minutes within 7 day(s). 7.  Patient will utilize energy conservation techniques during functional activities with verbal cues within 7 day(s). Outcome: Progressing Towards Goal   OCCUPATIONAL THERAPY EVALUATION  Patient: Ángel Bob (67 y.o. male)  Date: 2/7/2022  Primary Diagnosis: [facility] [I48.91]        Precautions: fall       ASSESSMENT  Based on the objective data described below, the patient presents with decreased activity tolerance, generalized weakness, impaired balance, and delayed processing and responses both verbally and physically following admission for Afib with RVR. Patient is able to provide history but has difficulty clarifying information or providing detailed information. Patient was recently (x2 weeks ago) discharged from SNF and was managing at home with wife's assistance and home health services prior to admission.   Today, patient was received in the chair and completed x5 sit to stand transfers and short distance transfers within the room in preparation for ADL transfers. Patient engaged in ADLs with good tolerance; He needs increased assistance for LB and standing ADLs. Patient would benefit from continued skilled OT to progress towards goals and improve overall independence. Current Level of Function Impacting Discharge (ADLs/self-care): Patient was received up and required min A for OOB transfers. Patient was independent to supervision/setup level for  UB ADLs and min to mod A for LB ADLs. Functional Outcome Measure: The patient scored Total: 40/100 on the Barthel Index outcome measure. Patient will benefit from skilled therapy intervention to address the above noted impairments. PLAN :  Recommendations and Planned Interventions: self care training, functional mobility training, therapeutic exercise, balance training, therapeutic activities, endurance activities, patient education, home safety training, and family training/education    Frequency/Duration: Patient will be followed by occupational therapy 5 times a week to address goals. Recommendation for discharge: (in order for the patient to meet his/her long term goals)  Occupational therapy at least 2 days/week in the home     This discharge recommendation:  Has been made in collaboration with the attending provider and/or case management    IF patient discharges home will need the following DME: none       SUBJECTIVE:   Patient agreeable to OT eval.      OBJECTIVE DATA SUMMARY:   HISTORY:   Past Medical History:   Diagnosis Date    Anemia     Atrial fibrillation ([address]     CHF (congestive heart failure), NYHA class I, chronic, diastolic ([facility])     has had to have lungs drained.     CKD (chronic kidney disease), stage III (HCC)     Hx of completed stroke     right sided affected    Hypoalbuminemia     Orthostatic hypotension     Parkinson disease (HCC)     Proteinuria      Past Surgical History:   Procedure Laterality Date    HX CHOLECYSTECTOMY      HX KNEE ARTHROSCOPY Left        Expanded or extensive additional review of patient history:     Home Situation  Support Systems: Spouse/Significant Other  Patient Expects to be Discharged to[de-identified] Home with home health    Hand dominance: Right    EXAMINATION OF PERFORMANCE DEFICITS:  Cognitive/Behavioral Status:  Neurologic State: Alert  Orientation Level: Oriented to person;Oriented to place;Oriented to situation;Disoriented to time  Cognition: Follows commands  Perception: Cues to maintain midline in standing  Perseveration: No perseveration noted  Safety/Judgement: Awareness of environment    Skin: Intact in the uppers    Edema: None noted in the uppers    Vision/Perceptual:    Tracking: Able to track stimulus in all quadrants w/o difficulty    Diplopia: No      Range of Motion:  AROM: Within functional limits  PROM: Within functional limits    Strength:  Strength: Generally decreased, functional in the uppers and lowers     Coordination:  Fine Motor Skills-Upper: Left Intact; Right Intact    Gross Motor Skills-Upper: Left Intact; Right Intact    Tone & Sensation:  Tone: normal  Sensation: intact    Balance:  Sitting: Intact  Standing: Impaired  Standing - Static: Fair  Standing - Dynamic : Fair    Functional Mobility and Transfers for ADLs:  Bed Mobility:  Rolling:  (pt was received up and remained up)  Scooting: Contact guard assistance (from the chair)    Transfers:  Sit to Stand: Minimum assistance  Stand to Sit: Minimum assistance  Bed to Chair: Minimum assistance    ADL Assessment:  Feeding: Independent    Oral Facial Hygiene/Grooming: Supervision;Setup    Bathing: Minimum assistance    Upper Body Dressing: Supervision;Setup    Lower Body Dressing:  Moderate assistance    Toileting: Minimum assistance    Cognitive Retraining  Safety/Judgement: Awareness of environment    Functional Measure:    Barthel Index:  Bathin  Bladder: 5  Bowels: 5  Groomin  Dressin  Feeding: 10  Mobility: 0  Stairs: 0  Toilet Use: 5  Transfer (Bed to Chair and Back): 10  Total: 40/100      The Barthel ADL Index: Guidelines  1. The index should be used as a record of what a patient does, not as a record of what a patient could do. 2. The main aim is to establish degree of independence from any help, physical or verbal, however minor and for whatever reason. 3. The need for supervision renders the patient not independent. 4. A patient's performance should be established using the best available evidence. Asking the patient, friends/relatives and nurses are the usual sources, but direct observation and common sense are also important. However direct testing is not needed. 5. Usually the patient's performance over the preceding 24-48 hours is important, but occasionally longer periods will be relevant. 6. Middle categories imply that the patient supplies over 50 per cent of the effort. 7. Use of aids to be independent is allowed. Score Interpretation (from [address] [address])    Independent   60-79 Minimally independent   40-59 Partially dependent   20-39 Very dependent   <20 Totally dependent     -Glenna Rodriguez., Barthel, DJermaineW. (1965). Functional evaluation: the Barthel Index. [facility] ([address] (). The Barthel activities of daily living index: self-reporting versus actual performance in the old (> or = 75 years). Journal of [address] [place] [address] 45(7), [facility], JEDDIE, Charles Serna., Danial Hernandez (1999). Measuring the change in disability after inpatient rehabilitation; comparison of the responsiveness of the Barthel Index and Functional [place] Measure. Journal of Neurology, Neurosurgery, and Psychiatry, 66(4), 517-078. Kelley Bhatti, N.GRAEME.A, MARY Cadena, & Jaguar Guillen M.A. (2004) Assessment of post-stroke quality of life in cost-effectiveness studies:  The usefulness of the Barthel Index and the EuroQoL-5D. Quality of Life Research, 15, 148-72     Occupational Therapy Evaluation Charge Determination   History Examination Decision-Making   LOW Complexity : Brief history review  LOW Complexity : 1-3 performance deficits relating to physical, cognitive , or psychosocial skils that result in activity limitations and / or participation restrictions  LOW Complexity : No comorbidities that affect functional and no verbal or physical assistance needed to complete eval tasks       Based on the above components, the patient evaluation is determined to be of the following complexity level: LOW   Activity Tolerance:   Good    After treatment patient left in no apparent distress:    Sitting in chair and Call bell within reach    COMMUNICATION/EDUCATION:   The patients plan of care was discussed with: Physical therapist, Registered nurse, and patient . Home safety education was provided and the patient/caregiver indicated understanding., Patient/family have participated as able in goal setting and plan of care. , and Patient/family agree to work toward stated goals and plan of care. This patients plan of care is appropriate for delegation to [facility].     Thank you for this referral.  Yvonne Smith OTR/L  Time Calculation: 25 mins

## 2022-02-07 NOTE — PROGRESS NOTES
Problem: Pressure Injury - Risk of  Goal: *Prevention of pressure injury  Description: Document Luisito Scale and appropriate interventions in the flowsheet.   2/7/2022 0547 by Mary Kate Canas  Outcome: Progressing Towards Goal  Note: Pressure Injury Interventions:  Sensory Interventions: Minimize linen layers,Pad between skin to skin    Moisture Interventions: Apply protective barrier, creams and emollients,Limit adult briefs,Maintain skin hydration (lotion/cream),Minimize layers,Moisture barrier    Activity Interventions: Pressure redistribution bed/mattress(bed type),PT/OT evaluation    Mobility Interventions: HOB 30 degrees or less,Pressure redistribution bed/mattress (bed type),PT/OT evaluation    Nutrition Interventions: Document food/fluid/supplement intake    Friction and Shear Interventions: Apply protective barrier, creams and emollients,HOB 30 degrees or less,Minimize layers             2/7/2022 0547 by Jethro MOSS  Outcome: Progressing Towards Goal  Note: Pressure Injury Interventions:  Sensory Interventions: Minimize linen layers,Pad between skin to skin    Moisture Interventions: Apply protective barrier, creams and emollients,Limit adult briefs,Maintain skin hydration (lotion/cream),Minimize layers,Moisture barrier    Activity Interventions: Pressure redistribution bed/mattress(bed type),PT/OT evaluation    Mobility Interventions: HOB 30 degrees or less,Pressure redistribution bed/mattress (bed type),PT/OT evaluation    Nutrition Interventions: Document food/fluid/supplement intake    Friction and Shear Interventions: Apply protective barrier, creams and emollients,HOB 30 degrees or less,Minimize layers                Problem: Patient Education: Go to Patient Education Activity  Goal: Patient/Family Education  2/7/2022 0547 by Jethro MOSS  Outcome: Progressing Towards Goal  2/7/2022 0547 by Jethro MOSS  Outcome: Progressing Towards Goal

## 2022-02-07 NOTE — ED TRIAGE NOTES
Patient arrives via EMS from home for complaints of generalized weakness and increased tremors ( has history of parkinson's)    Patient was seen by [facility]     Per EMS, oxygen saturations 85% with room air, placed on 4L nasal cannula     Room air saturations during triage 85%, placed back on 4L nasal cannula

## 2022-02-07 NOTE — PROGRESS NOTES
Spiritual Care Assessment/Progress Note  [facility]      NAME: Lan Fernandes      MRN: [id number]  AGE: 81 y.o. SEX: male  Jew Affiliation: No Anglican   Language: English     2/7/2022     Total Time (in minutes): 5     Spiritual Assessment begun in [facility] INTENSIVE CARE through conversation with:         []Patient        [] Family    [] Friend(s)        Reason for Consult: Initial visit     Spiritual beliefs: (Please include comment if needed)     [] Identifies with a hank tradition:         [] Supported by a hank community:            [] Claims no spiritual orientation:           [] Seeking spiritual identity:                [] Adheres to an individual form of spirituality:           [x] Not able to assess:                           Identified resources for coping:      [] Prayer                               [] Music                  [] Guided Imagery     [] Family/friends                 [] Pet visits     [] Devotional reading                         [x] Unknown     [] Other:                                             Interventions offered during this visit: (See comments for more details)    Patient Interventions: Initial visit           Plan of Care:     [] Support spiritual and/or cultural needs    [] Support AMD and/or advance care planning process      [] Support grieving process   [] Coordinate Rites and/or Rituals    [] Coordination with community clergy   [] No spiritual needs identified at this time   [] Detailed Plan of Care below (See Comments)  [] Make referral to Music Therapy  [] Make referral to Pet Therapy     [] Make referral to Addiction services  [] Make referral to [facility]  [] Make referral to Spiritual Care Partner  [] No future visits requested        [x] Contact Spiritual Care for further referrals     Attempted to visit pt for initial spiritual assessment. Unable to complete assessment at this time, pt sleeping and did not awake.  Pt's chart was consulted.   Chaplain Monroe MDiv, MS, [facility]

## 2022-02-07 NOTE — CONSULTS
Cinda Dinh MD, [address] [place] [address]  Denise Gonzalez 34  [phone]    Date of  Admission: 2/6/2022  8:16 PM         IMPRESSION and RECOMMENDATIONS     1. SOB:  No mechanism for CHF save possibly tachycardia/RVR and HTN in the setting of secondary stressor (? Infection). Now improving. Last echo unremarkable save issues with elevated pulmonary and right-sided pressures, likely responsible for edema. 2.  AF:  CVA prophylaxis with eliquis. HR now under control on no meds. Discussed with Pt and daughter. Problem List  Date Reviewed: 2/6/2022          Codes Class Noted    Sinus tachycardia ICD-10-CM: R00.0  ICD-9-CM: 427.89  2/6/2022        Sepsis ([facility] ICD-10-CM: A41.9  ICD-9-CM: 038.9, 995.91  2/6/2022        Fever ICD-10-CM: R50.9  ICD-9-CM: 780.60  2/6/2022        Atrial fibrillation with RVR (HCC) ICD-10-CM: I48.91  ICD-9-CM: 427.31  Unknown        CHF (congestive heart failure), NYHA class I, chronic, diastolic (HCC) [facility]-HH: I50.32  ICD-9-CM: 428.32  Unknown    Overview Signed 2/6/2022  8:35 PM by Marcella Dewey MD     has had to have lungs drained. Hx of completed stroke ICD-10-CM: Z86.73  ICD-9-CM: V12.54  Unknown    Overview Signed 2/6/2022  8:35 PM by Marcella Dewey MD     right sided affected             Orthostatic hypotension ICD-10-CM: I95.1  ICD-9-CM: 458.0  Unknown        Parkinson disease ([facility] ICD-10-CM: Duran Skeans  ICD-9-CM: 332.0  Unknown        Weakness generalized ICD-10-CM: R53.1  ICD-9-CM: 780.79  2/6/2022        Anemia ICD-10-CM: D64.9  ICD-9-CM: 645. 9  Unknown        Hypoalbuminemia ICD-10-CM: E88.09  ICD-9-CM: 273.8  Unknown        Proteinuria ICD-10-CM: R80.9  ICD-9-CM: 791.0  Unknown        CKD (chronic kidney disease), stage III ([facility] ICD-10-CM: N18.30  ICD-9-CM: 372. 3  Unknown        Atrial fibrillation (HCC) ICD-10-CM: I48.91  ICD-9-CM: 427.31  Unknown        Acute respiratory failure with hypoxia [facility]) ICD-10-CM: J96.01  ICD-9-CM: 518.81  12/20/2021              History of Present Illness:     Avni Ang is a 81 y.o. male with the above problem list who was admitted for Atrial fibrillation with [facility] [I48.91]. Mr. Juan Navarrete is a 81 y.o.  male who presented to the Emergency Department complaining of weakness. He is a poor historian due to dementia. He feels fine, and dose not recall any dyspnea or fever at home. Per family, dyspnea noted to worsen for days. Associated with fever. ER finds Afib with RVR, fever, and they find hypoxia on room air  He was recently admitted with CHF requiring thoracentesis of pleural effusions.       He denies chest pain/discomfort, shortness of breath, dyspnea on exertion, orthopnea, paroxysmal noctural dyspnea, lower extremity edema, palpitations, syncope, or near-syncope. Current Facility-Administered Medications   Medication Dose Route Frequency    apixaban (ELIQUIS) tablet 5 mg  5 mg Oral BID    carbidopa-levodopa (SINEMET)  mg per tablet 1.5 Tablet  1.5 Tablet Oral QID    midodrine (PROAMATINE) tablet 2.5 mg  2.5 mg Oral TID    sodium chloride (NS) flush 5-10 mL  5-10 mL IntraVENous PRN    acetaminophen (TYLENOL) tablet 650 mg  650 mg Oral Q6H PRN    Or    acetaminophen (TYLENOL) suppository 650 mg  650 mg Rectal Q6H PRN    polyethylene glycol (MIRALAX) packet 17 g  17 g Oral DAILY PRN    ondansetron (ZOFRAN ODT) tablet 4 mg  4 mg Oral Q8H PRN    Or    ondansetron (ZOFRAN) injection 4 mg  4 mg IntraVENous Q6H PRN      No Known Allergies   No family history on file.    Social History     Socioeconomic History    Marital status:      Spouse name: Not on file    Number of children: Not on file    Years of education: Not on file    Highest education level: Not on file   Occupational History    Not on file   Tobacco Use    Smoking status: Not on file    Smokeless tobacco: Not on file   Substance and Sexual Activity    Alcohol use: Not on file    Drug use: Not on file    Sexual activity: Not on file   Other Topics Concern    Not on file   Social History Narrative    Not on file     Social Determinants of Health     Financial Resource Strain:     Difficulty of Paying Living Expenses: Not on file   Food Insecurity:     Worried About Running Out of Food in the Last Year: Not on file    Magui of Food in the Last Year: Not on file   Transportation Needs:     Lack of Transportation (Medical): Not on file    Lack of Transportation (Non-Medical):  Not on file   Physical Activity:     Days of Exercise per Week: Not on file    Minutes of Exercise per Session: Not on file   Stress:     Feeling of Stress : Not on file   Social Connections:     Frequency of Communication with Friends and Family: Not on file    Frequency of Social Gatherings with Friends and Family: Not on file    Attends Mu-ism Services: Not on file    Active Member of [address] [place] [address] [employer] or Organizations: Not on file    Attends Club or Organization Meetings: Not on file    Marital Status: Not on file   Intimate Partner Violence:     Fear of Current or Ex-Partner: Not on file    Emotionally Abused: Not on file    Physically Abused: Not on file    Sexually Abused: Not on file   Housing Stability:     Unable to Pay for Housing in the Last Year: Not on file    Number of [place] in the Last Year: Not on file    Unstable Housing in the Last Year: Not on file       Physical Exam:     Patient Vitals for the past 16 hrs:   BP Temp Pulse Resp SpO2 Weight   02/07/22 1303   94 25 94 %    02/07/22 1301   94 26 91 %    02/07/22 1300 119/60  94 24 (!) 87 %    02/07/22 1200 110/60 98.4 °F (36.9 °C) 94 19 93 %    02/07/22 1100 (!) 146/77  83 20 94 %    02/07/22 1030 (!) 152/89  90 26 92 %    02/07/22 1000 (!) 147/95  87 29 91 %    02/07/22 0930   75 27 92 %    02/07/22 0900 134/83  77 20 98 %    02/07/22 0800 139/78  71 26 94 %  02/07/22 0730  98.9 °F (37.2 °C)       02/07/22 0715      80.8 kg (178 lb 2.1 oz)   02/07/22 0705   81      02/07/22 0700   77 21 98 %    02/07/22 0656 (!) 156/94  79 19 99 %    02/07/22 0600   73 18 100 %    02/07/22 0556 (!) 141/81  74 23 100 %    02/07/22 0456 131/79  70 22 100 %    02/07/22 0400 (!) 140/82 98.7 °F (37.1 °C) 77 21 98 %    02/07/22 0356 (!) 140/82  77 21 98 %    02/07/22 0256 139/83  71 21 99 %    02/07/22 0156 130/76  76 25 99 %    02/07/22 0056 139/85  87 29 99 %    02/07/22 0000 131/85 97.8 °F (36.6 °C) 87 22 94 %    02/06/22 2356   96      02/06/22 2320   95 24 91 %    02/06/22 2316 138/65 98.9 °F (37.2 °C) 95 24 96 %    02/06/22 2250  98.8 °F (37.1 °C) 89 26 100 %    02/06/22 2202 120/61  (!) 104 (!) 32 98 %        HEENT Exam:     Normocephalic, atraumatic. EOMI. Oropharynx negative. Neck supple. No lymphadenopathy. Lung Exam:     The patient is not dyspneic. There is no cough. Decreased BS at bases. There are no wheezes, rales, rhonchi, or rubs heard on auscultation. Heart Exam:     The rhythm is irregularly irregular. The PMI is in the 5th intercostal space of the MCL. Apical impulse is normal. S1 is regular. S2 is physiologic. There is no S3, S4 gallop, murmur, click, or rub. Abdomen Exam:     Bowel sounds are normoactive. Abdomen soft in all quadrants. No tenderness. No palpable masses. No organomegaly. No hernias noted. No bruits or pulsatile mass. Extremities Exam:     The extremities are atraumatic appearing. There is no clubbing, cyanosis, ulcers, varicose veins, rash, erythemia noted in the extremities. The neurovascular status is grossly intact with normal distal sensation and pulses. Mild edema. Vascular Exam:     The radial, brachial, dorsalis pedis, posterior tibial, are equal and strong bilaterally The carotids are equal bilaterally without bruits.           Labs:     Lab Results Component Value Date/Time    Glucose 163 (H) 02/07/2022 05:12 AM    Sodium 138 02/07/2022 05:12 AM    Potassium 4.8 02/07/2022 05:12 AM    Chloride 108 02/07/2022 05:12 AM    CO2 25 02/07/2022 05:12 AM    BUN 22 (H) 02/07/2022 05:12 AM    Creatinine 1.26 02/07/2022 05:12 AM    Calcium 8.3 (L) 02/07/2022 05:12 AM     Recent Labs     02/07/22  0512 02/06/22 2047   WBC 4.0* 6.7   HGB 8.1* 8.9*   HCT 27.3* 29.4*    263     Recent Labs     02/07/22 0512 02/06/22 2047   ALT <6* <6*   AP 76 81   TBILI 0.8 0.7   TP 7.0 7.9   ALB 2.6* 2.7*   GLOB 4.4* 5.2*     Recent Labs     02/06/22 2047   INR 1.1   PTP 11.8*   APTT 32.7*      No results for input(s): CPK, CKMB, TNIPOC in the last 72 hours. No lab exists for component: TROPONINI, ITNL  No results for input(s): TROIQ in the last 72 hours.   No results found for: CHOL, CHOLX, CHLST, CHOLV, HDL, HDLP, LDL, LDLC, DLDLP, TGLX, TRIGL, TRIGP, CHHD, CHHDX    EKG:  AF

## 2022-02-07 NOTE — PROGRESS NOTES
Problem: Mobility Impaired (Adult and Pediatric)  Goal: *Acute Goals and Plan of Care (Insert Text)  Description: FUNCTIONAL STATUS PRIOR TO ADMISSION: Patient required minimal assistance for basic and instrumental ADLs. HOME SUPPORT PRIOR TO ADMISSION: The patient lived with spouse and required minimal assistance/contact guard assist for ambulation using a rolling walker. Physical Therapy Goals  Initiated 2/7/2022  1. Patient will move from supine to sit and sit to supine , scoot up and down, and roll side to side in bed with supervision/set-up within 7 day(s). 2.  Patient will transfer from bed to chair and chair to bed with minimal assistance/contact guard assist using the least restrictive device within 7 day(s). 3.  Patient will perform sit to stand with minimal assistance/contact guard assist within 7 day(s). 4.  Patient will ambulate with minimal assistance/contact guard assist for 100 feet with the least restrictive device within 7 day(s). Outcome: Progressing Towards Goal   PHYSICAL THERAPY EVALUATION  Patient: Halima Adame (89 y.o. male)  Date: 2/7/2022  Primary Diagnosis: Atrial fibrillation with RVR ([address] [I48.91]        Precautions: fall. ASSESSMENT  Based on the objective data described below, the patient presents with difficulty with ambulation generalized weakness. Communicated with nurse cleared for therapy. Patient recent discharge from [facility] to home and was getting HHPT to work on his mobility and ambulation. Patient supine on bed when received spouse at bedside agreed with all goals set for the patient. Rolled on the edge of bed min assist, supine to sit min assist, sit to stand min assist, ambulate with rolling walker in the room and towards the recliner min assist. Noted some shuffling gait due to history of parkinson's and dementia at base line. OOB to chair as tolerated performed some active range of motion exercise on both LE all planes.  Activated chair alarm and notified nurse who was in the room with the patient agreed to monitor patient. Current Level of Function Impacting Discharge (mobility/balance): min assist with transfers and ambulation using a rolling walker    Functional Outcome Measure: The patient scored 11/28 on the tinetti outcome measure which is indicative of high fall risk. Other factors to consider for discharge: fall     Patient will benefit from skilled therapy intervention to address the above noted impairments. PLAN :  Recommendations and Planned Interventions: bed mobility training, transfer training, gait training, therapeutic exercises, neuromuscular re-education, orthotic/prosthetic training, patient and family training/education, and therapeutic activities      Frequency/Duration: Patient will be followed by physical therapy:  5 times a week to address goals. Recommendation for discharge: (in order for the patient to meet his/her long term goals) to be determine  Physical therapy at least 2 days/week in the home AND ensure assist and/or supervision for safety with rolling walker vs SNF if family not able to supervise patient. This discharge recommendation:  Has been made in collaboration with the attending provider and/or case management    IF patient discharges home will need the following DME: patient owns DME required for discharge         SUBJECTIVE:   Patient stated Melania Mcduffie.     OBJECTIVE DATA SUMMARY:   HISTORY:    Past Medical History:   Diagnosis Date    Anemia     Atrial fibrillation (HCC)     CHF (congestive heart failure), NYHA class I, chronic, diastolic (HCC)     has had to have lungs drained.     CKD (chronic kidney disease), stage III (HCC)     Hx of completed stroke     right sided affected    Hypoalbuminemia     Orthostatic hypotension     Parkinson disease ([facility]     Proteinuria      Past Surgical History:   Procedure Laterality Date    HX CHOLECYSTECTOMY      HX KNEE ARTHROSCOPY Left        Personal factors and/or comorbidities impacting plan of care:          EXAMINATION/PRESENTATION/DECISION MAKING:   Critical Behavior:  Neurologic State: Drowsy  Orientation Level: Oriented to person,Oriented to place,Oriented to situation,Disoriented to time  Cognition: Follows commands     Hearing:       Range Of Motion:  AROM: Within functional limits           PROM: Within functional limits           Strength:    Strength: Generally decreased, functional                    Tone & Sensation:                                  Coordination:  Coordination: Generally decreased, functional  Vision:      Functional Mobility:  Bed Mobility:  Rolling: Minimum assistance  Supine to Sit: Minimum assistance  Sit to Supine: Minimum assistance  Scooting: Minimum assistance  Transfers:  Sit to Stand: Minimum assistance  Stand to Sit: Minimum assistance  Stand Pivot Transfers: Minimum assistance     Bed to Chair: Minimum assistance              Balance:   Sitting: Intact; High guard  Standing: Impaired; With support  Standing - Static: Fair  Standing - Dynamic : Fair  Ambulation/Gait Training:  Distance (ft): 5 Feet (ft)  Assistive Device: Walker, rolling;Gait belt  Ambulation - Level of Assistance: Minimal assistance     Gait Description (WDL): Exceptions to WDL  Gait Abnormalities: Path deviations; Shuffling gait; Steppage gait        Base of Support: Widened     Speed/Jessenia: Shuffled; Slow  Step Length: Right shortened;Left shortened                            Therapeutic Exercises:   Educate and instructed patient to continue active range of motion exercise on both legs while up on chair or on bed multiple times. Recommend patient to be up on the chair at least 3 times a day every meal times as tolerated.          Functional Measure:  Tinetti test:    Sitting Balance: 1  Arises: 1  Attempts to Rise: 1  Immediate Standing Balance: 1  Standing Balance: 1  Nudged: 1  Eyes Closed: 0  Turn 360 Degrees - Continuous/Discontinuous: 0  Turn 360 Degrees - Steady/Unsteady: 0  Sitting Down: 1  Balance Score: 7 Balance total score  Indication of Gait: 0  R Step Length/Height: 0  L Step Length/Height: 0  R Foot Clearance: 1  L Foot Clearance: 1  Step Symmetry: 0  Step Continuity: 0  Path: 1  Trunk: 1  Walking Time: 0  Gait Score: 4 Gait total score  Total Score:  Overall total score         Tinetti Tool Score Risk of Falls  <19 = High Fall Risk  19-24 = Moderate Fall Risk  25-28 = Low Fall Risk  Tinetti ME. Performance-Oriented Assessment of Mobility Problems in Elderly Patients. [facility]; [id number]. (Scoring Description: PT Bulletin Feb. 10, 1993)    Older adults: Flavio Castillo et al, 2009; n = 1000 [facility] elderly evaluated with ABC, JJ, ADL, and IADL)  · Mean JJ score for males aged 69-68 years = 26.21(3.40)  · Mean JJ score for females age 69-68 years = 25.16(4.30)  · Mean JJ score for males over 80 years = 23.29(6.02)  · Mean JJ score for females over 80 years = 17.20(8.32)           Physical Therapy Evaluation Charge Determination   History Examination Presentation Decision-Making   MEDIUM  Complexity : 1-2 comorbidities / personal factors will impact the outcome/ POC  MEDIUM Complexity : 3 Standardized tests and measures addressing body structure, function, activity limitation and / or participation in recreation  MEDIUM Complexity : Evolving with changing characteristics  Other outcome measures tinetti  MEDIUM      Based on the above components, the patient evaluation is determined to be of the following complexity level: MEDIUM    Pain Ratin/10    Activity Tolerance:   Good    After treatment patient left in no apparent distress:   Sitting in chair, Heels elevated for pressure relief, Call bell within reach, Bed / chair alarm activated, and Caregiver / family present    COMMUNICATION/EDUCATION:   The patients plan of care was discussed with: Registered nurse and Case management.      Patient/family have participated as able in goal setting and plan of care.    Thank you for this referral.  Marion Lewis, PT,WCC.    Time Calculation: 28 mins

## 2022-02-07 NOTE — H&P
[facility]    Hospitalist Admission Note      NAME:  Joy Archibald   :   1935   MRN:  [id number]     PCP:  Oh Fonseca MD     Date/Time of service:  2022 8:40 PM          Subjective:     CHIEF COMPLAINT: weak     HISTORY OF PRESENT ILLNESS:     Mr. Bradly Sanches is a 81 y.o.  male who presented to the Emergency Department complaining of weakness. He is a poor historian due to dementia. He feels fine, and dose not recall any dyspnea or fever at home. Per family, dyspnea noted to worsen for days. Associated with fever. ER finds Afib with RVR, fever, and they find hypoxia on room air  He was recently admitted with CHF requiring thoracentesis of pleural effusions. Past Medical History:   Diagnosis Date    Anemia     Atrial fibrillation ([address]     CHF (congestive heart failure), NYHA class I, chronic, diastolic (HCC)     has had to have lungs drained.  CKD (chronic kidney disease), stage III ([address]     Hx of completed stroke     right sided affected    Hypoalbuminemia     Orthostatic hypotension     Parkinson disease (HCC)     Proteinuria         Past Surgical History:   Procedure Laterality Date    HX CHOLECYSTECTOMY      HX KNEE ARTHROSCOPY Left        Social History     Tobacco Use    Smoking status: Not on file    Smokeless tobacco: Not on file   Substance Use Topics    Alcohol use: Not on file        No family history on file. Family hx cannot be fully assessed, since the patient cannot provide information    No Known Allergies     Prior to Admission medications    Medication Sig Start Date End Date Taking? Authorizing Provider   acetaminophen (Tylenol Extra Strength) 500 mg tablet Take 1,000 mg by mouth two (2) times a day. Provider, Cheryle   apixaban (Eliquis) 5 mg tablet Take 5 mg by mouth two (2) times a day. Parish Steele MD   midodrine (PROAMATINE) 2.5 mg tablet Take 2.5 mg by mouth three (3) times daily.     Parish Steele MD carbidopa-levodopa (SINEMET)  mg per tablet Take 1.5 Tablets by mouth four (4) times daily. Other, MD Parish       Review of Systems:  (bold if positive, if negative)    Gen:  fever, chills, fatigueEyes:  ENT:  CVS:  Pulm:  dyspneaGI:  :  MS:  Skin:  Psych:  Endo:  Hem:  Renal:  Neuro:  weakness      Objective:      VITALS:    Vital signs reviewed; most recent are:    Visit Vitals  BP (!) 191/73   Pulse (!) 129   Temp (!) 102.3 °F (39.1 °C)   Resp 21   SpO2 98%     SpO2 Readings from Last 6 Encounters:   02/06/22 98%   12/24/21 99%    O2 Flow Rate (L/min): 4 l/min   No intake or output data in the 24 hours ending 02/06/22 2146     Exam:     Physical Exam:    Gen:  Frail, in no acute distress  HEENT:  Pink conjunctivae, PERRL, hearing intact to voice, moist mucous membranes  Neck:  Supple, without masses, thyroid non-tender  Resp:  No accessory muscle use, clear breath sounds without wheezes rales or rhonchi  Card:  No murmurs, irregular tachycardic S1, S2 without thrills, bruits or peripheral edema  Abd:  Soft, non-tender, non-distended, normoactive bowel sounds are present, no mass  Lymph:  No cervical or inguinal adenopathy  Musc:  No cyanosis or clubbing  Skin:  No rashes or ulcers, skin turgor is good  Neuro:  Cranial nerves are grossly intact, general motor weakness, follows commands   Psych:  Good insight, oriented to person, place and time, alert     Labs:    Recent Labs     02/06/22 2047   WBC 6.7   HGB 8.9*   HCT 29.4*        Recent Labs     02/06/22 2047      K 4.1      CO2 28   *   BUN 19   CREA 1.18   CA 8.5   ALB 2.7*   TBILI 0.7   ALT <6*     No results found for: GLUCPOC  No results for input(s): PH, PCO2, PO2, HCO3, FIO2 in the last 72 hours.   Recent Labs     02/06/22 2047   INR 1.1     All Micro Results     Procedure Component Value Units Date/Time    COVID-19 RAPID TEST [[id number]] Collected: 02/06/22 2047    Order Status: Completed Specimen: Nasopharyngeal Updated: 02/06/22 2139     Specimen source Nasopharyngeal        COVID-19 rapid test Not detected        Comment: Rapid Abbott ID Now       Rapid NAAT:  The specimen is NEGATIVE for SARS-CoV-2, the novel coronavirus associated with COVID-19. Negative results should be treated as presumptive and, if inconsistent with clinical signs and symptoms or necessary for patient management, should be tested with an alternative molecular assay. Negative results do not preclude SARS-CoV-2 infection and should not be used as the sole basis for patient management decisions. This test has been authorized by the FDA under an Emergency Use Authorization (EUA) for use by authorized laboratories. Fact sheet for Healthcare Providers: Lambda OpticalSystemsdate.co.nz  Fact sheet for Patients: trend.ly.co.nz       Methodology: Isothermal Nucleic Acid Amplification         RESPIRATORY VIRUS PANEL W/COVID-19, PCR [[id number]] Collected: 02/06/22 2113    Order Status: Completed Specimen: NASOPHARYNGEAL SWAB Updated: 02/06/22 2126    CULTURE, BLOOD [[id number]] Collected: 02/06/22 2059    Order Status: Completed Specimen: Blood Updated: 02/06/22 2114    CULTURE, BLOOD [[id number]] Collected: 02/06/22 2047    Order Status: Completed Specimen: Blood Updated: 02/06/22 2114    CULTURE, BLOOD, PAIRED [[id number]]     Order Status: Sent Specimen: Blood           I have reviewed previous records       Assessment and Plan:      Atrial fibrillation with RVR - POA, I suspect driven by fever and sepsis. Start diltiazem. Admit to stepdown. Consult cardiology. Recent ECHO with EF 60%. Hold Eliquis in case thoracentesis is needed again. Acute respiratory failure with hypoxia - POA, driven by RVR and CHF, but infection including pneumonia may be involved. Oxygen as needed. Sepsis / Sinus tachycardia / Fever - POA, unclear etiology so far. Check RVP, COVID, xray, UA, procalcitonin, blood cx.     CHF (congestive heart failure), NYHA class I, chronic, diastolic - POA, not usually on any diuretic in setting of orthostatic hypotension    Weakness / Hx of completed stroke / Parkinson disease - Worse than baseline. Continue sinemet, Fall precautions. PT OT eval.     Dementia - Obvious on interview. Unclear baseline. Would benefit from outpatient neuropsych consult. Orthostatic hypotension - Continue midodrine    Anemia - POA due to chronic disease. Recent serologies unremarkable. Hypoalbuminemia / Proteinuria - POA, likely due to CKD. Low albumin likely exacerbates, but doesn't cause, pleural effusions. Telemetry reviewed:   AFIB    Risk of deterioration: high      Total time spent with patient: 79 Minutes I personally reviewed chart, notes, data and current medications in the medical record. I have personally examined and treated the patient at bedside during this period.                  Care Plan discussed with: Patient, Nursing Staff and >50% of time spent in counseling and coordination of care    Discussed:  Care Plan       ___________________________________________________    Attending Physician: Shauna Yao MD

## 2022-02-07 NOTE — PROGRESS NOTES
Navid Perdomo [facility] [address]  [address] [facility], [place], [id number] [place] [address]  [phone]      Medical Progress Note      NAME: Radha Taylor   :  1935  MRM:  [id number]    Date of service: 2022  9:55 AM       Assessment and Plan:   1. Atrial fibrillation with RVR likely driven by fever and sepsis. rate is well controlled. evaluated by cardiology.  Recent ECHO with EF 60%.  Hold Eliquis in case thoracentesis is needed again.      2. Acute respiratory failure with hypoxia due to BL pleural effusion/ CHF. Cont supplement O2 to keep SAO2 > 90%. RVP is negative. Consult pulmonary      3. Sepsis / Sinus tachycardia / Fever - possible due to pneumonia. POA,  Check RVP. Rapid COVID test is negative, procalcitonin is not elevated. Check, blood cx. start empiric ABx.      4. CHF (congestive heart failure), NYHA class I, chronic, diastolic/ BL pleural effusion- POA, not usually on any diuretic in setting of orthostatic hypotension. Consult pulmonary for possible thoracocentesis.      5.  Weakness / Hx of completed stroke / Parkinson disease - Worse than baseline. Continue sinemet, Fall precautions.  PT OT eval.      6. Dementia - supportive care.      7. Orthostatic hypotension - Continue midodrine     8. Anemia - likely due to chronic disease. Recent serologies unremarkable. Subjective:     Chief Complaint[de-identified] Patient was seen and examined as a follow up for afib with RVR. Chart was reviewed. denies SOB     ROS:  (bold if positive, if negative)    Tolerating PT  Tolerating Diet        Objective:     Last 24hrs VS reviewed since prior progress note.  Most recent are:    Visit Vitals  /83   Pulse 75   Temp 98.9 °F (37.2 °C)   Resp 27   Wt 80.8 kg (178 lb 2.1 oz)   SpO2 92%   BMI 24.64 kg/m²     SpO2 Readings from Last 6 Encounters:   22 92%   21 99%    O2 Flow Rate (L/min): 3 l/min       Intake/Output Summary (Last 24 hours) at 2022 0918  Last data filed at 2022 0730  Gross per 24 hour   Intake 618.75 ml   Output    Net 618.75 ml        Physical Exam:    Gen:  Well-developed, well-nourished, in no acute distress  HEENT:  Pink conjunctivae, PERRL, hearing intact to voice, moist mucous membranes  Neck:  Supple, without masses, thyroid non-tender  Resp:  No accessory muscle use, clear breath sounds without wheezes rales or rhonchi  Card:  No murmurs, normal S1, S2 without thrills, bruits or peripheral edema  Abd:  Soft, non-tender, non-distended, normoactive bowel sounds are present, no palpable organomegaly and no detectable hernias  Lymph:  No cervical or inguinal adenopathy  Musc:  No cyanosis or clubbing  Skin:  No rashes or ulcers, skin turgor is good  Neuro:  Cranial nerves are grossly intact, no focal motor weakness, follows commands appropriately  Psych:  Good insight, oriented to person, place and time, alert  __________________________________________________________________  Medications Reviewed: (see below)  Medications:     Current Facility-Administered Medications   Medication Dose Route Frequency    sodium chloride (NS) flush 5-10 mL  5-10 mL IntraVENous PRN    acetaminophen (TYLENOL) tablet 650 mg  650 mg Oral Q6H PRN    Or    acetaminophen (TYLENOL) suppository 650 mg  650 mg Rectal Q6H PRN    polyethylene glycol (MIRALAX) packet 17 g  17 g Oral DAILY PRN    ondansetron (ZOFRAN ODT) tablet 4 mg  4 mg Oral Q8H PRN    Or    ondansetron (ZOFRAN) injection 4 mg  4 mg IntraVENous Q6H PRN        Lab Data Reviewed: (see below)  Lab Review:     Recent Labs     02/07/22 0512 02/06/22 2047   WBC 4.0* 6.7   HGB 8.1* 8.9*   HCT 27.3* 29.4*    263     Recent Labs     02/07/22 0512 02/06/22 2047    139   K 4.8 4.1    105   CO2 25 28   * 131*   BUN 22* 19   CREA 1.26 1.18   CA 8.3* 8.5   MG 2.1 2.1   ALB 2.6* 2.7*   TBILI 0.8 0.7   ALT <6* <6*   INR  --  1.1     No results found for: GLUCPOC  No results for input(s): PH, PCO2, PO2, HCO3, FIO2 in the last 72 hours. Recent Labs     02/06/22 2047   INR 1.1     All Micro Results     Procedure Component Value Units Date/Time    RESPIRATORY VIRUS PANEL W/COVID-19, PCR [[id number]] Collected: 02/06/22 2113    Order Status: Completed Specimen: Nasopharyngeal Updated: 02/07/22 0913     Adenovirus Not detected        Coronavirus 229E Not detected        Coronavirus HKU1 Not detected        Coronavirus CVNL63 Not detected        Coronavirus OC43 Not detected        SARS-CoV-2, PCR Not detected        Metapneumovirus Not detected        Rhinovirus and Enterovirus Not detected        Influenza A Not detected        Influenza A, subtype H1 Not detected        Influenza A, subtype H3 Not detected        INFLUENZA A H1N1 PCR Not detected        Influenza B Not detected        Parainfluenza 1 Not detected        Parainfluenza 2 Not detected        Parainfluenza 3 Not detected        Parainfluenza virus 4 Not detected        RSV by PCR Not detected        B. parapertussis, PCR Not detected        Bordetella pertussis - PCR Not detected        Chlamydophila pneumoniae DNA, QL, PCR Not detected        Mycoplasma pneumoniae DNA, QL, PCR Not detected       CULTURE, BLOOD [[id number]] Collected: 02/06/22 2047    Order Status: Completed Specimen: Blood Updated: 02/07/22 0625     Special Requests: NO SPECIAL REQUESTS        Culture result: NO GROWTH AFTER 9 HOURS       CULTURE, BLOOD [[id number]] Collected: 02/06/22 2059    Order Status: Completed Specimen: Blood Updated: 02/07/22 0625     Special Requests: NO SPECIAL REQUESTS        Culture result: NO GROWTH AFTER 9 HOURS       COVID-19 RAPID TEST [[id number]] Collected: 02/06/22 2047    Order Status: Completed Specimen: Nasopharyngeal Updated: 02/06/22 2139     Specimen source Nasopharyngeal        COVID-19 rapid test Not detected        Comment: Rapid Abbott ID Now       Rapid NAAT:  The specimen is NEGATIVE for SARS-CoV-2, the novel coronavirus associated with COVID-19. Negative results should be treated as presumptive and, if inconsistent with clinical signs and symptoms or necessary for patient management, should be tested with an alternative molecular assay. Negative results do not preclude SARS-CoV-2 infection and should not be used as the sole basis for patient management decisions. This test has been authorized by the FDA under an Emergency Use Authorization (EUA) for use by authorized laboratories. Fact sheet for Healthcare Providers: [employer] Credit Exchangedate.co.nz  Fact sheet for Patients: [employer] Credit Exchangedate.co.nz       Methodology: Isothermal Nucleic Acid Amplification         CULTURE, BLOOD, PAIRED [[id number]]     Order Status: Sent Specimen: Blood           I have reviewed notes of prior 24hr. Other pertinent lab: Total time spent with patient: 28 I personally reviewed chart, notes, data and current medications in the medical record. I have personally examined and treated the patient at bedside during this period.                  Care Plan discussed with: Patient, Nursing Staff and >50% of time spent in counseling and coordination of care    Discussed:  Care Plan    Prophylaxis:  SCD's    Disposition:  Home w/Family           ___________________________________________________    Attending Physician: Elis Pineda MD

## 2022-02-07 NOTE — ED NOTES
TRANSFER - OUT REPORT:    Verbal report given to GIANNA Aranda(name) on Michelle Holder  being transferred to ICU(unit) for routine progression of care       Report consisted of patients Situation, Background, Assessment and   Recommendations(SBAR). Information from the following report(s) SBAR, ED Summary and Cardiac Rhythm Afbi was reviewed with the receiving nurse. Lines:   Peripheral IV 02/06/22 Anterior;Right Forearm (Active)       Peripheral IV 02/06/22 Anterior; Left Forearm (Active)        Opportunity for questions and clarification was provided.       Patient transported with:   Monitor  O2 @ 4 liters  Registered Nurse

## 2022-02-07 NOTE — PROGRESS NOTES
Dr. Lisandra Richter last office note:      Valentino Radon 1935   Office/Outpatient Visit  Visit Date: Thu, Aug 26, 2021 11:00 am  Provider: Otto Lange MD (Assistant: Mayur Valdez RN )  Location: [facility] AT [facility] ([facility])[address] [place]. [address] [phone]    Electronically signed by Victor Manuel Multani MD on  08/26/2021 11:22:00 AM                           Subjective:    CC: Mr. Burke Cunha is a 81year old White male. His primary care physician is Therese Antonio MD.  This is a 3  month follow-up visit. He presents today with a complaint of edema and shortness of breath. Medication bottles reviewed. He has a history of atrial fibrillation and old cerebrovascular accident. HPI:       Personal history of transient ischemic attack (TIA), and cerebral infarction without residual deficits noted. Atrial Fibrillation:  MD Notes: Admitted for hypotension on 3/2021. Off Furosemide. On Midodrine. Regarding persistent atrial fibrillation: His [id number] score is 4. Current related medications include Eliquis (Apixaban). Current level of activity includes ability to walk with a walker. He has noted shortness of breath, pedal edema and fatigue. He has not been aware of an irregular heartbeat, chest pain or bleeding. A transthoracic ECHO has been done ( official interpretation shows 90/43/3428 Normal LV systolic function with an estimated ejection fraction of 65%. The left atrium is moderately enlarged. There is trace mitral regurgitation. There is trace tricuspid regurgitation. There is trace pulmonary regurgitation. ). Blood pressure at home has been in the 120s over 80s. Regarding dyspnea/shortness of breath: This tends to be worse with exertion. The shortness of breath is better rest.        Additionally, he presents with history of localized edema. the swelling has primarily involved the lower extremities. Patient is wearing compression stockings. Regarding fatigue/malaise: This has been somewhat intermittent recently. Past Medical History / Family History / Social History:     Last Reviewed on 2021 11:11 AM by Gerard Gonzales  Past Medical History:     Atrial Fibrillation  Hypertension   Cerebrovascular Accident: in 2016;   Parkinsons   INFLUENZA VACCINE: was last done 2020   COVID-19 VACCINE: was last done 2021 Moderna   PNEUMOCOCCAL VACCINE: was last done      Past Cardiac Procedures/Tests:  Echocardiogram on  Normal LV systolic function with an estimated ejection fraction of 65%. The left atrium is moderately enlarged. There is trace mitral regurgitation. There is trace tricuspid regurgitation. There is trace pulmonary regurgitation. .  Nuclear Study:  Marleen Rodriguez 16 - Normal myocardial perfusion Tetrofosmin Myoview SPECT imaging. Calculated left ventricular ejection fraction was normal at 62%. Surgical History:   Surgical/Procedural History:   Arthroscopy: Left knee  Cholecystectomy: open procedure; 2021- Dr. José Miguel Gastelum   basal cell skin cancer removal     Family History:   Father:  at age 79  ; Positve for Pancreatic cancer; Mother:  at age 80  ; Positive for Congestive Heart Failure; ;     Social History:   Social History:   Occupation: Retired   Marital Status:    Children: 2 children     Tobacco/Alcohol/Supplements:   Last Reviewed on 2021 11:11 AM by Gerard Gonzales  TOBACCO/ALCOHOL/SUPPLEMENTS   Tobacco: He has never smoked. Alcohol: Drinks alcohol very infrequently. Caffeine:  He admits to consuming caffeine via coffee ( 1 serving per day ) and tea ( 1 serving per day ). Substance Abuse History:   Last Reviewed on 2021 11:11 AM by Gerard Gonzales  Substance Use/Abuse:   None     Mental Health History:   Last Reviewed on 2021 11:11 AM by Gerard Gonzales    Communicable Diseases (eg STDs):    Last Reviewed on 2021 11:11 AM by Poppy Schaeffer Chaparro Silveira    Current Problems:   Last Reviewed on 8/26/2021 11:11 AM by Sheila Lazcano  Atrial fibrillation  Unspecified atrial fibrillation  Old CVA  Essential hypertension, benign  Essential (primary) hypertension  Shortness of breath  Shortness of Breath  Persistent atrial fibrillation  Hypotension  Cardiac murmur, unspecified  Personal history of transient ischemic attack (TIA), and cerebral infarction without residual deficits  Localized edema  Dizziness and giddiness  Dyspnea, unspecified    Allergies:   Last Reviewed on 8/26/2021 11:11 AM by Sheila Lazcano  No Known Allergies. Current Medications:   Last Reviewed on 8/26/2021 11:11 AM by Sheila Lazcano  senna  [prn]  Eliquis 5 mg oral tablet [TAKE 1 TABLET BY MOUTH TWICE DAILY]  carbidopa-levodopa  mg oral tablet [1 1/2 po Qid]  midodrine 2.5 mg oral tablet [TAKE 1 TABLET(2.5 MG) BY MOUTH THREE TIMES DAILY]  levocetirizine 5 mg oral tablet [take 1 tablet (5 mg) by oral route once daily at bedtime]    Objective:    Vitals:     Historical:   5/24/2021  BP:   143/76 mm Hg ( (left arm, , standing, );) 5/24/2021  BP:   139/90 mm Hg ( (left arm, , sitting, );) 5/24/2021  Wt:   179lbs  Current: 8/26/2021 11:15:38 AM  Ht:  6 ft, 1 in; Wt: 185 lbs;  BMI: 24.4    Exams:   GENERAL:  Alert, oriented to person, place and time. HEENT:  Pinkish palpebral  conjunctivae. Anicteric sclerae. NECK:  No jugular vein engorgement. No bruit. CHEST: Equal expansion. Clear breath sounds. No rales, no wheezing. Heart: Irregular rhythm. Grade 2/6 systolic ejection murmur at the left sternal border area. ABDOMEN:  Soft. Normal active bowel sounds. No tenderness. Extremities: 2+ pitting pedal edema. Procedures:   Persistent atrial fibrillation    ECG INTERPRETATION: See scanned EKG for results.         Assessment:     Z86.73   Personal history of transient ischemic attack (TIA), and cerebral infarction without residual deficits     I48.1   Persistent atrial fibrillation     I48.1   Persistent atrial fibrillation     R01.1   Cardiac murmur, unspecified     R06.02   Shortness of breath     R60.0   Localized edema     R53.81   Other malaise       R53.83 Other fatigue  ORDERS:     Procedures Ordered:     [id number]  Education and train for pt self-mgmt by qualified, nonphysician, ea 30 minutes; individual pt  (Send-Out)          XECD  Echocardiogram  (In-House)          39205  Electrocardiogram, routine with at least 12 leads; with interpretation and report  (In-House)          Other Orders:     CJ LAGUNAS  (Send-Out)          [id number]  Queried Patient for Tobacco Use  (Send-Out)              Plan:     Persistent atrial fibrillation    *  Plan of care for atrial fibrillation: Follow up visit He is tolerating a rate/rhythm control  with prescribed medications. The patient remains anticoagulated with Eliquis. Patient continues to tolerate anticoagulation therapy. .  1.  Medication list has been reviewed. Continue current medications. Smoking Status:  Nonsmoker   2. Advised the patient regarding diet, exercise, and lifestyle modification. 3.  The patient to call the office if there is any change in his cardiac symptoms. 4.  Explained to the patient the importance of controlling his cardiac risk factors. Schedule a follow-up appointment in 6 months. The above note was transcribed by Jasen Duran and authenticated by Dr. Maru Priest prior to sign off.         Orders:     [id number]  Electrocardiogram, routine with at least 12 leads; with interpretation and report  (In-House)            Other Orders    CJ LAGUNAS  (Send-Out)          [id number]  Queried Patient for Tobacco Use  (Send-Out)          [id number]  Education and train for pt self-mgmt by qualified, nonphysician, ea 30 minutes; individual pt  (Send-Out)          XECD  Echocardiogram  (In-House)          Other Patient Education Handouts:     COV Atrial Fibrillation      COV Heart Healthy Diet      COV Hypertension      Patient Recommendations: For  Persistent atrial fibrillation:    *  Plan of care for atrial fibrillation: I would like you to continue anticoagulation therapy with Eliquis. 1.  Your medication list has been reviewed. 2.  You have been advised regarding diet, exercise, and lifestyle. modification. 3.  Please call the office if there is any change in your cardiac symptoms. 4.  Explained to you the importance of controlling your cardiac risk factors. Schedule a follow-up visit in 6 months.

## 2022-02-07 NOTE — PROGRESS NOTES
TRANSFER - IN REPORT:    Verbal report received from Kathy Benjamin RN(name) on Yeny Ellington  being received from ED(unit) for routine progression of care      Report consisted of patients Situation, Background, Assessment and   Recommendations(SBAR). Information from the following report(s) SBAR, Intake/Output, MAR, Recent Results, Med Rec Status and Cardiac Rhythm Afib was reviewed with the receiving nurse. Opportunity for questions and clarification was provided. Assessment completed upon patients arrival to unit and care assumed. 0000-Assessment completed and patient resting quietly with eyes closed. Contacted wife at home to give her an update, No answer but left message of his current location and left ICU phone number to call the unit when available. 0200-Patient resting quietly with eyes closed and mouth open. 0400-Reassessment completed. No changes noted at this time. Labs drawn and sent to the lab. Patient resting quietly with eyes closed and mouth open. 0600-Patient resting quietly with eyes closed and mouth open.      0700-Handoff report given to oncoming nurseCarol RN

## 2022-02-07 NOTE — ED NOTES
Patient's wife called for patient update and made aware patient admitted to ICU, patient's wife requesting to be called with any updates    Claritza Wray, ICU RN made aware

## 2022-02-08 NOTE — PROGRESS NOTES
Bedside shift change report given to Oj Larose (oncoming nurse) by Aleksandra Cody (offgoing nurse). Report included the following information SBAR, Kardex, Intake/Output, MAR, Accordion and Recent Results.

## 2022-02-08 NOTE — PROGRESS NOTES
Bedside and Verbal shift change report given to Nathan Acevedo RN (oncoming nurse) by Vince Davalos RN (offgoing nurse). Report included the following information SBAR, Intake/Output, MAR, Recent Results, Cardiac Rhythm A-Fib and Alarm Parameters . Primary Nurse Alba Foote RN and Vince Davalos RN performed a dual skin assessment on this patient No impairment noted  Luisito score is See Flowsheets    See Flowsheets for all assessments. See MAR for all medication administrations. TRANSFER - OUT REPORT:    Verbal report given to Douglas Ross RN(name) on Shade Hernandez  being transferred to 5th Floor (unit) for routine progression of care       Report consisted of patients Situation, Background, Assessment and   Recommendations(SBAR). Information from the following report(s) SBAR, Intake/Output, MAR, Recent Results, Cardiac Rhythm A-Fib and Alarm Parameters  was reviewed with the receiving nurse. Lines:   Peripheral IV 02/06/22 Anterior;Right Forearm (Active)   Site Assessment Clean, dry, & intact 02/07/22 2000   Phlebitis Assessment 0 02/07/22 2000   Infiltration Assessment 0 02/07/22 2000   Dressing Status Clean, dry, & intact 02/07/22 2000   Dressing Type Transparent;Tape 02/07/22 2000   Hub Color/Line Status Pink;Flushed;Capped 02/07/22 2000   Action Taken Open ports on tubing capped 02/07/22 2000   Alcohol Cap Used Yes 02/07/22 2000       Peripheral IV 02/06/22 Anterior; Left Forearm (Active)   Site Assessment Clean, dry, & intact 02/07/22 2000   Phlebitis Assessment 0 02/07/22 2000   Infiltration Assessment 0 02/07/22 2000   Dressing Status Clean, dry, & intact 02/07/22 2000   Dressing Type Transparent;Tape 02/07/22 2000   Hub Color/Line Status Pink;Flushed;Capped 02/07/22 2000   Action Taken Open ports on tubing capped 02/07/22 2000   Alcohol Cap Used Yes 02/07/22 2000        Opportunity for questions and clarification was provided.       Patient transported with:   Monitor  Registered Nurse     Pts eyeglasses were transported with him. Notified Wife Xiomy Adams of patient moving to bed 517.

## 2022-02-08 NOTE — PROGRESS NOTES
2330  Bedside and Verbal shift change report given to Stacy Phillips RN (oncoming nurse) by Aileen Jaramillo RN (offgoing nurse). Report included the following information SBAR, Kardex, Intake/Output, MAR, Accordion, Recent Results and Med Rec Status.

## 2022-02-08 NOTE — PROGRESS NOTES
Rohith Izaguirre MD, [address] [place] [address]  Denise Gonzalez 34  [phone]      IMPRESSION and RECOMMENDATIONS     1. SOB:  No mechanism for CHF save possibly tachycardia/RVR and HTN in the setting of secondary stressor (? Infection). Now improving. Last echo unremarkable save issues with elevated pulmonary and right-sided pressures, likely responsible for edema.     2.  AF:  CVA prophylaxis with eliquis. HR now under control on no meds. I don't see any burning cardiac issues at this time. I think I'll see prn, but let me know if issues arise.     Discussed with Pt and wife. Subjective:       No complaints. Objective:   Patient Vitals for the past 16 hrs:   BP Temp Pulse Resp SpO2   02/08/22 0801 (!) 144/83 97.9 °F (36.6 °C) 80 18 96 %   02/08/22 0705   74     02/08/22 0512 (!) 151/69 97.9 °F (36.6 °C) 72 16 (!) 89 %   02/08/22 0023 (!) 142/75 97.7 °F (36.5 °C) 78 18 95 %   02/07/22 2331   77     02/07/22 2158 (!) 157/77 97.3 °F (36.3 °C) 85 21 97 %   02/07/22 2000 123/62 98.2 °F (36.8 °C) 85 25 96 %   02/07/22 1900 123/75  89 21 95 %       HEENT Exam:     WNL         Lung Exam:     The patient is not dyspneic. Decreased BS at bases. There are no wheezes, rales, rhonchi, or rubs heard on auscultation. Heart Exam:     The rhythm is irregularly irregular. The PMI is in the 5th intercostal space of the MCL. Apical impulse is normal. S1 is regular. S2 is physiologic. There is no S3, S4 gallop, murmur, click, or rub. Abdomen Exam:     Benign. Extremities Exam:     No cyanosis, clubbing. Distal pulses intact. Mild edema.            Lab Results   Component Value Date/Time    Glucose 124 (H) 02/08/2022 06:02 AM    Sodium 141 02/08/2022 06:02 AM    Potassium 4.0 02/08/2022 06:02 AM    Chloride 108 02/08/2022 06:02 AM    CO2 28 02/08/2022 06:02 AM    BUN 29 (H) 02/08/2022 06:02 AM    Creatinine 1.23 02/08/2022 06:02 AM    Calcium 8.5 02/08/2022 06:02 AM     Recent Labs     02/08/22  0602 02/07/22 0512   WBC 4.0* 4.0*   HGB 7.7* 8.1*   HCT 25.4* 27.3*    239     Recent Labs     02/07/22 0512 02/06/22 2047   ALT <6* <6*   AP 76 81   TBILI 0.8 0.7   TP 7.0 7.9   ALB 2.6* 2.7*   GLOB 4.4* 5.2*     Recent Labs     02/06/22 2047   INR 1.1   PTP 11.8*   APTT 32.7*      No results for input(s): CPK, CKMB, TNIPOC in the last 72 hours. No lab exists for component: TROPONINI, ITNL  No results for input(s): TROIQ in the last 72 hours.   No results found for: CHOL, CHOLX, CHLST, CHOLV, HDL, HDLP, LDL, LDLC, DLDLP, TGLX, TRIGL, TRIGP, CHHD, CHHDX

## 2022-02-08 NOTE — PROGRESS NOTES
Name: Nikolas Ours: [address]   : 1935 Admit Date: 2022   Phone: [phone]  Room: [facility]   PCP: Julian Gamez MD  MRN: [id number]   Date: 2022  Code: Full Code          Chart and notes reviewed. Data reviewed. I review the patient's current medications in the medical record at each encounter. I have evaluated and examined the patient. HPI:    8:27 AM       History was obtained from chart review. I was asked by Charity Perales MD to see Yuniel Patel in consultation for a chief complaint of pleural effusions. History of Present Illness:  Mr. Kathleen Lemus is an 82 yo gentleman with a history of CHF, afib on Elquis, chronic bilateral pleural effusions, Parkinson's Disease, and dementia who is admitted for weakness. He comes in with weakness. He was evaluated by [facility] and found to be hypoxic to 85% on RA. He has a history of chronic bilateral effusions. He is follwed by [facility] and December he had bilateral thoracentesis ( and ). He was then admitted to [facility] - and found to have bilateral effusions. As his effusions were stable to post-thora images at [facility] and the concern for reaccumulation due to underlying CHF and fluid overload, the decision was made not proceed with thora at that time. Has persistent effusions on CTA today, but again not significantly changed from post-thora volumes. Labs: WBC 4.0, Hgb 8.1, , cr 1.26, CRP 7.33, rapid COVID negative, RVP penidng    Images reviewed:  CTA 2022: negative for PE; moderate bilateral effusions (L>R) with associated atelectasis, not significantly increased from December    Interval History:    Afebrile  BP stable  Sats 93% on RA  Hgb 7.7--decreased  Blood cultures negative x 2 days  845 mL documented OP    ROS:  Feels well today. Denies SOB. His wife states that he has a had a mild cough.         Past Medical History:   Diagnosis Date    Anemia     Atrial fibrillation ([address]  CHF (congestive heart failure), NYHA class I, chronic, diastolic (HCC)     has had to have lungs drained.  CKD (chronic kidney disease), stage III ([facility]     Hx of completed stroke     right sided affected    Hypoalbuminemia     Orthostatic hypotension     Parkinson disease (HCC)     Proteinuria        Past Surgical History:   Procedure Laterality Date    HX CHOLECYSTECTOMY      HX KNEE ARTHROSCOPY Left        No family history on file. Social History     Tobacco Use    Smoking status: Not on file    Smokeless tobacco: Not on file   Substance Use Topics    Alcohol use: Not on file       No Known Allergies    Current Facility-Administered Medications   Medication Dose Route Frequency    apixaban (ELIQUIS) tablet 5 mg  5 mg Oral BID    carbidopa-levodopa (SINEMET)  mg per tablet 1.5 Tablet  1.5 Tablet Oral QID    midodrine (PROAMATINE) tablet 2.5 mg  2.5 mg Oral TID    sodium chloride (NS) flush 5-10 mL  5-10 mL IntraVENous PRN    acetaminophen (TYLENOL) tablet 650 mg  650 mg Oral Q6H PRN    Or    acetaminophen (TYLENOL) suppository 650 mg  650 mg Rectal Q6H PRN    polyethylene glycol (MIRALAX) packet 17 g  17 g Oral DAILY PRN    ondansetron (ZOFRAN ODT) tablet 4 mg  4 mg Oral Q8H PRN    Or    ondansetron (ZOFRAN) injection 4 mg  4 mg IntraVENous Q6H PRN         REVIEW OF SYSTEMS   12 point ROS negative except as stated in the HPI. Physical Exam:   Visit Vitals  /71 (BP 1 Location: Right upper arm, BP Patient Position: At rest)   Pulse 84   Temp 97.5 °F (36.4 °C)   Resp 20   Wt 80.8 kg (178 lb 2.1 oz)   SpO2 93%   BMI 24.64 kg/m²       General:  Awake, alert, no distress   Head:  Normocephalic, without obvious abnormality   Eyes:  Conjunctivae/corneas clear. Nose: Nares normal. Septum midline. Throat: Lips, mucosa, and tongue normal.    Neck: Supple, symmetrical, trachea midline, no adenopathy.    Lungs:   Slightly diminished in the bases   Chest wall:  No tenderness or deformity. Heart:  Regular rate and rhythm, S1, S2 normal, no murmur, click, rub or gallop. Abdomen:   Soft, non-tender. Bowel sounds normal.   Extremities: Extremities normal, atraumatic, 1+ BLE edema   Pulses: 2+ and symmetric all extremities. Skin: Skin color, texture, turgor normal. No rashes or lesions       Neurologic: Grossly nonfocal       Lab Results   Component Value Date/Time    Sodium 141 02/08/2022 06:02 AM    Potassium 4.0 02/08/2022 06:02 AM    Chloride 108 02/08/2022 06:02 AM    CO2 28 02/08/2022 06:02 AM    BUN 29 (H) 02/08/2022 06:02 AM    Creatinine 1.23 02/08/2022 06:02 AM    Glucose 124 (H) 02/08/2022 06:02 AM    Calcium 8.5 02/08/2022 06:02 AM    Magnesium 2.2 02/08/2022 06:02 AM    Phosphorus 3.7 12/21/2021 04:12 AM    Lactic acid 1.1 02/06/2022 08:47 PM       Lab Results   Component Value Date/Time    WBC 4.0 (L) 02/08/2022 06:02 AM    HGB 7.7 (L) 02/08/2022 06:02 AM    PLATELET 081 61/09/3141 06:02 AM    MCV 82.5 02/08/2022 06:02 AM       Lab Results   Component Value Date/Time    INR 1.1 02/06/2022 08:47 PM    aPTT 32.7 (H) 02/06/2022 08:47 PM    Alk.  phosphatase 76 02/07/2022 05:12 AM    Protein, total 7.0 02/07/2022 05:12 AM    Albumin 2.6 (L) 02/07/2022 05:12 AM    Globulin 4.4 (H) 02/07/2022 05:12 AM       Lab Results   Component Value Date/Time    Iron 23 (L) 12/21/2021 04:12 AM    TIBC 207 (L) 12/21/2021 04:12 AM    Iron % saturation 11 (L) 12/21/2021 04:12 AM    Ferritin 340 02/06/2022 08:47 PM       Lab Results   Component Value Date/Time    C-Reactive protein 7.33 (H) 02/06/2022 08:47 PM    TSH 3.78 (H) 12/21/2021 04:12 AM        No results found for: PH, PHI, PCO2, PCO2I, PO2, PO2I, HCO3, HCO3I, FIO2, FIO2I    No results found for: CPK, RCK1, RCK2, RCK3, RCK4, CKNDX, CKND1, TROPT, TROIQ, BNPP, BNP     Lab Results   Component Value Date/Time    Culture result: NO GROWTH 2 DAYS 02/06/2022 08:59 PM    Culture result: NO GROWTH 2 DAYS 02/06/2022 08:47 PM    Culture result: MRSA NOT PRESENT 12/21/2021 05:12 AM    Culture result:  12/21/2021 05:12 AM     Screening of patient nares for MRSA is for surveillance purposes and, if positive, to facilitate isolation considerations in high risk settings. It is not intended for automatic decolonization interventions per se as regimens are not sufficiently effective to warrant routine use. No results found for: TOXA1, RPR, HBCM, HBSAG, HAAB, HCAB1, HAAT, G6PD, CRYAC, HIVGT, HIVR, HIV1, HIV12, HIVPC, HIVRPI    No results found for: VANCT, CPK    Lab Results   Component Value Date/Time    Color DARK YELLOW 12/20/2021 08:56 PM    Appearance TURBID (A) 12/20/2021 08:56 PM    pH (UA) 5.0 12/20/2021 08:56 PM    Protein 100 (A) 12/20/2021 08:56 PM    Glucose Negative 12/20/2021 08:56 PM    Ketone TRACE (A) 12/20/2021 08:56 PM    Blood SMALL (A) 12/20/2021 08:56 PM    Urobilinogen 1.0 12/20/2021 08:56 PM    Nitrites Negative 12/20/2021 08:56 PM    Leukocyte Esterase Negative 12/20/2021 08:56 PM    WBC 0-4 12/20/2021 08:56 PM    RBC 5-10 12/20/2021 08:56 PM    Bacteria Negative 12/20/2021 08:56 PM       IMPRESSION  · Chronic bilateral effusions  · CHF  · Afib, chronically on Elquis  · Volume overload    PLAN  · Goal sats 88% or higher, satting 98% on 2L and can wean as tolerated  · Does not require urgent thoracentesis, fluid volumes are not significantly changed since prior admission which were compatible with levels seen post-thoracentesis. He is at high risk of reaccumulating fluid given his underlying heart failure.  Will hold Eliquis in the even that fluid levels increase and could consider thora as early as Wednesday  · Will recheck CXR tomorrow  · Cardiology following    Edmar Ruiz NP

## 2022-02-08 NOTE — PROGRESS NOTES
[facility] [address]  [address] [facility], [address] [place] [address]  [phone]      Medical Progress Note      NAME: Cat Daniel   :  1935  MRM:  [id number]    Date of service: 2022  9:55 AM       Assessment and Plan:   1. Atrial fibrillation with RVR likely driven by fever and sepsis. rate is well controlled now. evaluated by cardiology.  Recent ECHO with EF 60%.  Hold Eliquis in case thoracentesis is needed again.      2. Acute respiratory failure with hypoxia due to BL pleural effusion/ CHF. Cont supplement O2 to keep SAO2 > 90%. RVP is negative. Appreciated pulmonary consult     3. Sepsis / Sinus tachycardia / Fever - possible due to pneumonia. POA,  RVP is negative, procalcitonin is not elevated. Check, blood cx. On empiric ABx.      4. CHF (congestive heart failure), NYHA class I, chronic, diastolic/ BL pleural effusion- POA, not usually on any diuretic in setting of orthostatic hypotension. Evaluated by pulmonary      5.  Weakness / Hx of completed stroke / Parkinson disease - Worse than baseline. Continue sinemet, Fall precautions.  PT OT eval.      6. Dementia - supportive care.      7. Orthostatic hypotension - Continue midodrine     8. Anemia - likely due to chronic disease. Recent serologies unremarkable. Subjective:     Chief Complaint[de-identified] Patient was seen and examined as a follow up for afib with RVR. Chart was reviewed. denies SOB     ROS:  (bold if positive, if negative)    Tolerating PT  Tolerating Diet        Objective:     Last 24hrs VS reviewed since prior progress note.  Most recent are:    Visit Vitals  BP (!) 144/83 (BP 1 Location: Right upper arm, BP Patient Position: At rest)   Pulse 80   Temp 97.9 °F (36.6 °C)   Resp 18   Wt 80.8 kg (178 lb 2.1 oz)   SpO2 96%   BMI 24.64 kg/m²     SpO2 Readings from Last 6 Encounters:   22 96%   21 99%    O2 Flow Rate (L/min): 3 l/min       Intake/Output Summary (Last 24 hours) at 2022 2387  Last data filed at 2/7/2022 2000  Gross per 24 hour   Intake    Output 845 ml   Net -845 ml        Physical Exam:    Gen:  Well-developed, well-nourished, in no acute distress  HEENT:  Pink conjunctivae, PERRL, hearing intact to voice, moist mucous membranes  Neck:  Supple, without masses, thyroid non-tender  Resp:  No accessory muscle use, clear breath sounds without wheezes rales or rhonchi  Card:  No murmurs, normal S1, S2 without thrills, bruits or peripheral edema  Abd:  Soft, non-tender, non-distended, normoactive bowel sounds are present, no palpable organomegaly and no detectable hernias  Lymph:  No cervical or inguinal adenopathy  Musc:  No cyanosis or clubbing  Skin:  No rashes or ulcers, skin turgor is good  Neuro:  Cranial nerves are grossly intact, no focal motor weakness, follows commands appropriately  Psych:  Good insight, oriented to person, place and time, alert  __________________________________________________________________  Medications Reviewed: (see below)  Medications:     Current Facility-Administered Medications   Medication Dose Route Frequency    apixaban (ELIQUIS) tablet 5 mg  5 mg Oral BID    carbidopa-levodopa (SINEMET)  mg per tablet 1.5 Tablet  1.5 Tablet Oral QID    midodrine (PROAMATINE) tablet 2.5 mg  2.5 mg Oral TID    sodium chloride (NS) flush 5-10 mL  5-10 mL IntraVENous PRN    acetaminophen (TYLENOL) tablet 650 mg  650 mg Oral Q6H PRN    Or    acetaminophen (TYLENOL) suppository 650 mg  650 mg Rectal Q6H PRN    polyethylene glycol (MIRALAX) packet 17 g  17 g Oral DAILY PRN    ondansetron (ZOFRAN ODT) tablet 4 mg  4 mg Oral Q8H PRN    Or    ondansetron (ZOFRAN) injection 4 mg  4 mg IntraVENous Q6H PRN        Lab Data Reviewed: (see below)  Lab Review:     Recent Labs     02/08/22  0602 02/07/22  0512 02/06/22 2047   WBC 4.0* 4.0* 6.7   HGB 7.7* 8.1* 8.9*   HCT 25.4* 27.3* 29.4*    239 263     Recent Labs     02/08/22  0602 02/07/22  2119 02/06/22 2047    138 139   K 4.0 4.8 4.1    108 105   CO2 28 25 28   * 163* 131*   BUN 29* 22* 19   CREA 1.23 1.26 1.18   CA 8.5 8.3* 8.5   MG  --  2.1 2.1   ALB  --  2.6* 2.7*   TBILI  --  0.8 0.7   ALT  --  <6* <6*   INR  --   --  1.1     No results found for: GLUCPOC  No results for input(s): PH, PCO2, PO2, HCO3, FIO2 in the last 72 hours.   Recent Labs     02/06/22 2047   INR 1.1     All Micro Results     Procedure Component Value Units Date/Time    CULTURE, BLOOD [[id number]] Collected: 02/06/22 2047    Order Status: Completed Specimen: Blood Updated: 02/08/22 0550     Special Requests: NO SPECIAL REQUESTS        Culture result: NO GROWTH 2 DAYS       CULTURE, BLOOD [[id number]] Collected: 02/06/22 2059    Order Status: Completed Specimen: Blood Updated: 02/08/22 0550     Special Requests: NO SPECIAL REQUESTS        Culture result: NO GROWTH 2 DAYS       RESPIRATORY VIRUS PANEL W/COVID-19, PCR [[id number]] Collected: 02/06/22 2113    Order Status: Completed Specimen: Nasopharyngeal Updated: 02/07/22 0913     Adenovirus Not detected        Coronavirus 229E Not detected        Coronavirus HKU1 Not detected        Coronavirus CVNL63 Not detected        Coronavirus OC43 Not detected        SARS-CoV-2, PCR Not detected        Metapneumovirus Not detected        Rhinovirus and Enterovirus Not detected        Influenza A Not detected        Influenza A, subtype H1 Not detected        Influenza A, subtype H3 Not detected        INFLUENZA A H1N1 PCR Not detected        Influenza B Not detected        Parainfluenza 1 Not detected        Parainfluenza 2 Not detected        Parainfluenza 3 Not detected        Parainfluenza virus 4 Not detected        RSV by PCR Not detected        B. parapertussis, PCR Not detected        Bordetella pertussis - PCR Not detected        Chlamydophila pneumoniae DNA, QL, PCR Not detected        Mycoplasma pneumoniae DNA, QL, PCR Not detected       COVID-19 RAPID TEST [[id number]] Collected: 02/06/22 2047    Order Status: Completed Specimen: Nasopharyngeal Updated: 02/06/22 2139     Specimen source Nasopharyngeal        COVID-19 rapid test Not detected        Comment: Rapid Abbott ID Now       Rapid NAAT:  The specimen is NEGATIVE for SARS-CoV-2, the novel coronavirus associated with COVID-19. Negative results should be treated as presumptive and, if inconsistent with clinical signs and symptoms or necessary for patient management, should be tested with an alternative molecular assay. Negative results do not preclude SARS-CoV-2 infection and should not be used as the sole basis for patient management decisions. This test has been authorized by the FDA under an Emergency Use Authorization (EUA) for use by authorized laboratories. Fact sheet for Healthcare Providers: ConventionUpdate.co.nz  Fact sheet for Patients: ConventionUpdate.co.nz       Methodology: Isothermal Nucleic Acid Amplification         CULTURE, BLOOD, PAIRED [[id number]]     Order Status: Sent Specimen: Blood           I have reviewed notes of prior 24hr. Other pertinent lab: Total time spent with patient: 28 I personally reviewed chart, notes, data and current medications in the medical record. I have personally examined and treated the patient at bedside during this period.                  Care Plan discussed with: Patient, Nursing Staff and >50% of time spent in counseling and coordination of care    Discussed:  Care Plan    Prophylaxis:  SCD's    Disposition:  Home w/Family           ___________________________________________________    Attending Physician: Ashlee Schreiber MD

## 2022-02-08 NOTE — PROGRESS NOTES
2/8/2022  10:26 AM  Pt discussed in IDR rounds, is continuing to require medical management for AFib w/RVR, Acute respiratory failure with hypoxia due to BL pleural effusion/ CHF, Sepsis / Sinus tachycardia / Fever,Weakness / Hx of completed stroke / Parkinson disease, dementia, ortho static hypotension, anemia  Transitions of Care Plan:   RUR 18%  LOS 2 Days  1. Medical management continues  2. Cardiology following   3. Pulmonary following   4. PT, OT following, HH has been arranged w/ [facility], orders and clinicals sent in [employer], AVS updated   5. CM to follow through for treatment/response  6. DC when stable to home w/ family assistance and HH  7. outpatient f/u PCP, cardiology   8.  Transport TBD SHELLIE tomlin vs family  Darry Frankel, BS

## 2022-02-09 NOTE — PROGRESS NOTES
Patient's BP: 160/84. Repeat is 163/79. Ok to hold 2200 dose of midodrine per Dr. Orlando Garcia. Will continue to monitor. 0730: Bedside and Verbal shift change report given to Iram KATZ (oncoming nurse) by Александр Álvarez. Milagros Fabian RN (offgoing nurse). Report included the following information SBAR, Kardex, Intake/Output, MAR and Recent Results.

## 2022-02-09 NOTE — PROGRESS NOTES
Name: Amber Chaer: Crescencio Romero   : 1935 Admit Date: 2022   Phone: [phone]  Room: [facility]   PCP: Miguel Cheng MD  MRN: [id number]   Date: 2022  Code: Full Code          Chart and notes reviewed. Data reviewed. I review the patient's current medications in the medical record at each encounter. I have evaluated and examined the patient. HPI:    8:27 AM       History was obtained from chart review. I was asked by Mitchell Guillen MD to see [facility] in consultation for a chief complaint of pleural effusions. History of Present Illness:  Mr. Jenna Condon is an 80 yo gentleman with a history of CHF, afib on Elquis, chronic bilateral pleural effusions, Parkinson's Disease, and dementia who is admitted for weakness. He comes in with weakness. [facility] and found to be hypoxic to 85% on RA. He has a history of chronic bilateral effusions. He is follwed by [facility] and December he had bilateral thoracentesis ( and ). He was then admitted to [facility] - and found to have bilateral effusions. As his effusions were stable to post-thora images at [facility] and the concern for reaccumulation due to underlying CHF and fluid overload, the decision was made not proceed with thora at that time. Has persistent effusions on CTA today, but again not significantly changed from post-thora volumes. Labs: WBC 4.0, Hgb 8.1, , cr 1.26, CRP 7.33, rapid COVID negative, RVP penidng    Images reviewed:  CTA 2022: negative for PE; moderate bilateral effusions (L>R) with associated atelectasis, not significantly increased from December    Interval History:    Afebrile  BP stable  Sats 97% on 2L--desaturated to 87% overnight on RA  Hgb 7.7--decreased  Blood cultures negative x 3 days  CXR : bilateral pleural effusions unchanged  300mL documented output  Blood cultures negative x 3 days    ROS:  Feels well today. Denies SOB.   Sitting up in chair.  Discussed potential for thora and he is agreeable. Past Medical History:   Diagnosis Date    Anemia     Atrial fibrillation ([facility]     CHF (congestive heart failure), NYHA class I, chronic, diastolic (HCC)     has had to have lungs drained.  CKD (chronic kidney disease), stage III ([facility]     Hx of completed stroke     right sided affected    Hypoalbuminemia     Orthostatic hypotension     Parkinson disease (HCC)     Proteinuria        Past Surgical History:   Procedure Laterality Date    HX CHOLECYSTECTOMY      HX KNEE ARTHROSCOPY Left        No family history on file. Social History     Tobacco Use    Smoking status: Not on file    Smokeless tobacco: Not on file   Substance Use Topics    Alcohol use: Not on file       No Known Allergies    Current Facility-Administered Medications   Medication Dose Route Frequency    [Held by provider] apixaban (ELIQUIS) tablet 5 mg  5 mg Oral BID    carbidopa-levodopa (SINEMET)  mg per tablet 1.5 Tablet  1.5 Tablet Oral QID    midodrine (PROAMATINE) tablet 2.5 mg  2.5 mg Oral TID    sodium chloride (NS) flush 5-10 mL  5-10 mL IntraVENous PRN    acetaminophen (TYLENOL) tablet 650 mg  650 mg Oral Q6H PRN    Or    acetaminophen (TYLENOL) suppository 650 mg  650 mg Rectal Q6H PRN    polyethylene glycol (MIRALAX) packet 17 g  17 g Oral DAILY PRN    ondansetron (ZOFRAN ODT) tablet 4 mg  4 mg Oral Q8H PRN    Or    ondansetron (ZOFRAN) injection 4 mg  4 mg IntraVENous Q6H PRN         REVIEW OF SYSTEMS   12 point ROS negative except as stated in the HPI. Physical Exam:   Visit Vitals  BP (!) 146/82 (BP 1 Location: Left upper arm, BP Patient Position: At rest)   Pulse 82   Temp 97.5 °F (36.4 °C)   Resp 17   Wt 80.8 kg (178 lb 2.1 oz)   SpO2 97%   BMI 24.64 kg/m²       General:  Awake, alert, no distress   Head:  Normocephalic, without obvious abnormality   Eyes:  Conjunctivae/corneas clear. Nose: Nares normal. Septum midline.     Throat: Lips, mucosa, and tongue normal.    Neck: Supple, symmetrical, trachea midline, no adenopathy. Lungs:   Slightly diminished in the bases   Chest wall:  No tenderness or deformity. Heart:  Regular rate and rhythm, S1, S2 normal, no murmur, click, rub or gallop. Abdomen:   Soft, non-tender. Bowel sounds normal.   Extremities: Extremities normal, atraumatic, 1+ BLE edema   Pulses: 2+ and symmetric all extremities. Skin: Skin color, texture, turgor normal. No rashes or lesions       Neurologic: Grossly nonfocal       Lab Results   Component Value Date/Time    Sodium 141 02/08/2022 06:02 AM    Potassium 4.0 02/08/2022 06:02 AM    Chloride 108 02/08/2022 06:02 AM    CO2 28 02/08/2022 06:02 AM    BUN 29 (H) 02/08/2022 06:02 AM    Creatinine 1.23 02/08/2022 06:02 AM    Glucose 124 (H) 02/08/2022 06:02 AM    Calcium 8.5 02/08/2022 06:02 AM    Magnesium 2.2 02/08/2022 06:02 AM    Phosphorus 3.7 12/21/2021 04:12 AM    Lactic acid 1.1 02/06/2022 08:47 PM       Lab Results   Component Value Date/Time    WBC 4.0 (L) 02/08/2022 06:02 AM    HGB 7.7 (L) 02/08/2022 06:02 AM    PLATELET 783 70/12/7913 06:02 AM    MCV 82.5 02/08/2022 06:02 AM       Lab Results   Component Value Date/Time    INR 1.1 02/06/2022 08:47 PM    aPTT 32.7 (H) 02/06/2022 08:47 PM    Alk.  phosphatase 76 02/07/2022 05:12 AM    Protein, total 7.0 02/07/2022 05:12 AM    Albumin 2.6 (L) 02/07/2022 05:12 AM    Globulin 4.4 (H) 02/07/2022 05:12 AM       Lab Results   Component Value Date/Time    Iron 23 (L) 12/21/2021 04:12 AM    TIBC 207 (L) 12/21/2021 04:12 AM    Iron % saturation 11 (L) 12/21/2021 04:12 AM    Ferritin 340 02/06/2022 08:47 PM       Lab Results   Component Value Date/Time    C-Reactive protein 7.33 (H) 02/06/2022 08:47 PM    TSH 3.78 (H) 12/21/2021 04:12 AM        No results found for: PH, PHI, PCO2, PCO2I, PO2, PO2I, HCO3, HCO3I, FIO2, FIO2I    No results found for: CPK, RCK1, RCK2, RCK3, RCK4, CKNDX, CKND1, TROPT, TROIQ, BNPP, BNP     Lab Results   Component Value Date/Time    Culture result: NO GROWTH 3 DAYS 02/06/2022 08:59 PM    Culture result: NO GROWTH 3 DAYS 02/06/2022 08:47 PM    Culture result: MRSA NOT PRESENT 12/21/2021 05:12 AM    Culture result:  12/21/2021 05:12 AM     Screening of patient nares for MRSA is for surveillance purposes and, if positive, to facilitate isolation considerations in high risk settings. It is not intended for automatic decolonization interventions per se as regimens are not sufficiently effective to warrant routine use. No results found for: TOXA1, RPR, HBCM, HBSAG, HAAB, HCAB1, HAAT, G6PD, CRYAC, HIVGT, HIVR, HIV1, HIV12, HIVPC, HIVRPI    No results found for: VANCT, CPK    Lab Results   Component Value Date/Time    Color DARK YELLOW 12/20/2021 08:56 PM    Appearance TURBID (A) 12/20/2021 08:56 PM    pH (UA) 5.0 12/20/2021 08:56 PM    Protein 100 (A) 12/20/2021 08:56 PM    Glucose Negative 12/20/2021 08:56 PM    Ketone TRACE (A) 12/20/2021 08:56 PM    Blood SMALL (A) 12/20/2021 08:56 PM    Urobilinogen 1.0 12/20/2021 08:56 PM    Nitrites Negative 12/20/2021 08:56 PM    Leukocyte Esterase Negative 12/20/2021 08:56 PM    WBC 0-4 12/20/2021 08:56 PM    RBC 5-10 12/20/2021 08:56 PM    Bacteria Negative 12/20/2021 08:56 PM       IMPRESSION  · Chronic bilateral effusions  · CHF  · Afib, chronically on Elquis  · Volume overload    PLAN  · Goal sats 88% or higher, satting 98% on 2L and can wean as tolerated  · Will proceed with thoracentesis; will have to wait until Friday.   Hold eliquis  · Cardiology following    Dk Jimenez NP

## 2022-02-09 NOTE — PROGRESS NOTES
2/9/2022  12:38 PM  Pt discussed in IDR rounds, is continuing to require medical management for AFib w/RVR, Acute respiratory failure with hypoxia due to BL pleural effusion/ CHF, Sepsis / Sinus tachycardia / Fever,Weakness / Hx of completed stroke / Parkinson disease, dementia, ortho static hypotension, anemia  Transitions of Care Plan:   RUR 18%  LOS 3 Days  1. Medical management continues  2. Cardiology following   3. Pulmonary plan for thoracentesis today, pt on 2L O2 NC, follow for home O2 needs    4. PT, OT following, HH has been arranged w/ [facility],   5. CM to follow through for treatment/response  6. DC when stable to home w/ family assistance and HH  7. outpatient f/u PCP, cardiology, pulmonary  8.  Transport TBD SHELLIE English vs family  MANI Torres

## 2022-02-09 NOTE — PROGRESS NOTES
Bedside shift change report [facility] (on[facility] nurse[facility] by Alvin Tafoya RN (offgoing nurse). Report included the following information SBAR, Intake/Output, MAR and Med Rec Status.

## 2022-02-09 NOTE — PROGRESS NOTES
Problem: Self Care Deficits Care Plan (Adult)  Goal: *Acute Goals and Plan of Care (Insert Text)  Description: FUNCTIONAL STATUS PRIOR TO ADMISSION: Patient was modified independent using a walker for functional mobility and needed some assistance for ADLs; Patient unable to clarify level of assist required just prior to admission. HOME SUPPORT: The patient lived with wife. Occupational Therapy Goals  Initiated 2/7/2022  1. Patient will perform grooming with modified independence within 7 day(s). 2.  Patient will perform upper body dressing and bathing with modified independence within 7 day(s). 3.  Patient will perform lower body dressing and bathing with supervision/setup within 7 day(s). 4.  Patient will perform toilet transfers with supervision using rolling walker within 7 day(s). 5.  Patient will perform all aspects of toileting with supervision/set-up within 7 day(s). 6.  Patient will participate in upper extremity therapeutic exercise/activities with modified independence for 10 minutes within 7 day(s). 7.  Patient will utilize energy conservation techniques during functional activities with verbal cues within 7 day(s). Outcome: Progressing Towards Goal   OCCUPATIONAL THERAPY TREATMENT  Patient: Lan Fernandes (20 y.o. male)  Date: 2/9/2022  Diagnosis: Atrial fibrillation with [facility] ([facility] [I48.91] <principal problem not specified>       Precautions:    Chart, occupational therapy assessment, plan of care, and goals were reviewed. ASSESSMENT  Patient continues with skilled OT services and is progressing towards goals. Patient received in chair following PT session. Patient pleasant and agreeable to exercises while seated in chair. Patent performs with rest breaks between sets. He performs shoulder shrugs, should abduction, shoulder flexion and tricep extensions with chair push ups. Patient performs first exercises with supervision, 10 reps each.   He requires min assist for chair push ups for safety and balance. Patient continues to benefit from continued OT services. Current Level of Function Impacting Discharge (ADLs): min assist/CGA    Other factors to consider for discharge: lives with family         PLAN :  Patient continues to benefit from skilled intervention to address the above impairments. Continue treatment per established plan of care to address goals. Recommend with staff:     Recommend next OT session: continue goals    Recommendation for discharge: (in order for the patient to meet his/her long term goals)  Occupational therapy at least 2 days/week in the home AND ensure assist and/or supervision for safety with ADLs and transfers     This discharge recommendation:  Has been made in collaboration with the attending provider and/or case management    IF patient discharges home will need the following DME: TBD       SUBJECTIVE:   Patient stated .   Patient agreeable to activity  OBJECTIVE DATA SUMMARY:   Cognitive/Behavioral Status:  Neurologic State: Alert  Orientation Level: Oriented X4  Cognition: Follows commands  Perception: Appears intact  Perseveration: No perseveration noted  Safety/Judgement: Awareness of environment    Functional Mobility and Transfers for ADLs:  Bed Mobility:  Supine to Sit: Minimum assistance; Moderate assistance    Transfers:  Sit to Stand: Minimum assistance;Contact guard assistance          Balance:  Sitting: Intact  Standing: With support  Standing - Static: Fair    ADL Intervention:                                     Cognitive Retraining  Safety/Judgement: Awareness of environment    Therapeutic Exercises:   Shoulder flexion  Shoulder abduction  Shoulder shrugs  Chair push up (tricep extension)      Pain:  Does not report    Activity Tolerance:   Good    After treatment patient left in no apparent distress:   Sitting in chair, Call bell within reach, and Bed / chair alarm activated    COMMUNICATION/COLLABORATION:   The patients plan of care was discussed with: Physical therapy assistant and Registered nurse.      Marietta Chicas, OTR/L  Time Calculation: 25 mins

## 2022-02-09 NOTE — PROGRESS NOTES
Navid Shawelsen [facility]  [address] [facility], [place], [id number] [place] [address]  [phone]      Medical Progress Note      NAME: Terrence Deluca   :  1935  MRM:  [id number]    Date of service: 2022  9:55 AM       Assessment and Plan:   1. Atrial fibrillation with RVR likely driven by fever and sepsis. rate is well controlled now. evaluated by cardiology.  Recent ECHO with EF 60%.  Hold Eliquis in case thoracentesis is needed again.      2. Acute respiratory failure with hypoxia due to BL pleural effusion/ CHF. CXR: unchanged pleural effusion. RVP is negative. Appreciated pulmonary consult and no plan for thoracocentesis.      3. Sepsis / Sinus tachycardia / Fever - possible due to pneumonia. POA,  RVP is negative, procalcitonin is not elevated. blood cx is negative so far. Stopped empiric ABx.      4. CHF (congestive heart failure), NYHA class I, chronic, diastolic/ BL pleural effusion- POA, not usually on any diuretic in setting of orthostatic hypotension. Evaluated by pulmonary      5.  Weakness / Hx of completed stroke / Parkinson disease - Worse than baseline. Continue sinemet, Fall precautions.  PT OT eval.      6. Dementia - supportive care.      7. Orthostatic hypotension - Continue midodrine     8. Anemia - likely due to chronic disease. Recent serologies unremarkable. Subjective:     Chief Complaint[de-identified] Patient was seen and examined as a follow up for afib with RVR. Chart was reviewed. feels well. ROS:  (bold if positive, if negative)    Tolerating PT  Tolerating Diet        Objective:     Last 24hrs VS reviewed since prior progress note.  Most recent are:    Visit Vitals  BP (!) 161/81 (BP 1 Location: Right upper arm, BP Patient Position: At rest)   Pulse 85   Temp 97.7 °F (36.5 °C)   Resp 17   Wt 80.8 kg (178 lb 2.1 oz)   SpO2 97%   BMI 24.64 kg/m²     SpO2 Readings from Last 6 Encounters:   22 97%   21 99%    O2 Flow Rate (L/min): 2 l/min Intake/Output Summary (Last 24 hours) at 2/9/2022 0846  Last data filed at 2/8/2022 2012  Gross per 24 hour   Intake 112 ml   Output 300 ml   Net -188 ml        Physical Exam:    Gen:  Well-developed, well-nourished, in no acute distress  HEENT:  Pink conjunctivae, PERRL, hearing intact to voice, moist mucous membranes  Neck:  Supple, without masses, thyroid non-tender  Resp:  No accessory muscle use, clear breath sounds without wheezes rales or rhonchi  Card:  No murmurs, normal S1, S2 without thrills, bruits or peripheral edema  Abd:  Soft, non-tender, non-distended, normoactive bowel sounds are present, no palpable organomegaly and no detectable hernias  Lymph:  No cervical or inguinal adenopathy  Musc:  No cyanosis or clubbing  Skin:  No rashes or ulcers, skin turgor is good  Neuro:  Cranial nerves are grossly intact, no focal motor weakness, follows commands appropriately  Psych:  Good insight, oriented to person, place and time, alert  __________________________________________________________________  Medications Reviewed: (see below)  Medications:     Current Facility-Administered Medications   Medication Dose Route Frequency    apixaban (ELIQUIS) tablet 5 mg  5 mg Oral BID    carbidopa-levodopa (SINEMET)  mg per tablet 1.5 Tablet  1.5 Tablet Oral QID    midodrine (PROAMATINE) tablet 2.5 mg  2.5 mg Oral TID    sodium chloride (NS) flush 5-10 mL  5-10 mL IntraVENous PRN    acetaminophen (TYLENOL) tablet 650 mg  650 mg Oral Q6H PRN    Or    acetaminophen (TYLENOL) suppository 650 mg  650 mg Rectal Q6H PRN    polyethylene glycol (MIRALAX) packet 17 g  17 g Oral DAILY PRN    ondansetron (ZOFRAN ODT) tablet 4 mg  4 mg Oral Q8H PRN    Or    ondansetron (ZOFRAN) injection 4 mg  4 mg IntraVENous Q6H PRN        Lab Data Reviewed: (see below)  Lab Review:     Recent Labs     02/08/22  0602 02/07/22  0512 02/06/22  2047   WBC 4.0* 4.0* 6.7   HGB 7.7* 8.1* 8.9*   HCT 25.4* 27.3* 29.4*    239 263     Recent Labs     02/08/22  0602 02/07/22  0512 02/06/22 2047    138 139   K 4.0 4.8 4.1    108 105   CO2 28 25 28   * 163* 131*   BUN 29* 22* 19   CREA 1.23 1.26 1.18   CA 8.5 8.3* 8.5   MG 2.2 2.1 2.1   ALB  --  2.6* 2.7*   TBILI  --  0.8 0.7   ALT  --  <6* <6*   INR  --   --  1.1     No results found for: GLUCPOC  No results for input(s): PH, PCO2, PO2, HCO3, FIO2 in the last 72 hours.   Recent Labs     02/06/22 2047   INR 1.1     All Micro Results     Procedure Component Value Units Date/Time    CULTURE, BLOOD [[id number]] Collected: 02/06/22 2059    Order Status: Completed Specimen: Blood Updated: 02/09/22 0512     Special Requests: NO SPECIAL REQUESTS        Culture result: NO GROWTH 3 DAYS       CULTURE, BLOOD [[id number]] Collected: 02/06/22 2047    Order Status: Completed Specimen: Blood Updated: 02/09/22 0512     Special Requests: NO SPECIAL REQUESTS        Culture result: NO GROWTH 3 DAYS       RESPIRATORY VIRUS PANEL W/COVID-19, PCR [[id number]] Collected: 02/06/22 2113    Order Status: Completed Specimen: Nasopharyngeal Updated: 02/07/22 0913     Adenovirus Not detected        Coronavirus 229E Not detected        Coronavirus HKU1 Not detected        Coronavirus CVNL63 Not detected        Coronavirus OC43 Not detected        SARS-CoV-2, PCR Not detected        Metapneumovirus Not detected        Rhinovirus and Enterovirus Not detected        Influenza A Not detected        Influenza A, subtype H1 Not detected        Influenza A, subtype H3 Not detected        INFLUENZA A H1N1 PCR Not detected        Influenza B Not detected        Parainfluenza 1 Not detected        Parainfluenza 2 Not detected        Parainfluenza 3 Not detected        Parainfluenza virus 4 Not detected        RSV by PCR Not detected        B. parapertussis, PCR Not detected        Bordetella pertussis - PCR Not detected        Chlamydophila pneumoniae DNA, QL, PCR Not detected        Mycoplasma pneumoniae DNA, QL, PCR Not detected       COVID-19 RAPID TEST [[id number]] Collected: 02/06/22 2047    Order Status: Completed Specimen: Nasopharyngeal Updated: 02/06/22 2139     Specimen source Nasopharyngeal        COVID-19 rapid test Not detected        Comment: Rapid Abbott ID Now       Rapid NAAT:  The specimen is NEGATIVE for SARS-CoV-2, the novel coronavirus associated with COVID-19. Negative results should be treated as presumptive and, if inconsistent with clinical signs and symptoms or necessary for patient management, should be tested with an alternative molecular assay. Negative results do not preclude SARS-CoV-2 infection and should not be used as the sole basis for patient management decisions. This test has been authorized by the FDA under an Emergency Use Authorization (EUA) for use by authorized laboratories. Fact sheet for Healthcare Providers: ConventionUpdate.co.nz  Fact sheet for Patients: ConventionUpdate.co.nz       Methodology: Isothermal Nucleic Acid Amplification         CULTURE, BLOOD, PAIRED [[id number]] Collected: 02/06/22 2047    Order Status: Canceled Specimen: Blood           I have reviewed notes of prior 24hr. Other pertinent lab: Total time spent with patient: 28 I personally reviewed chart, notes, data and current medications in the medical record. I have personally examined and treated the patient at bedside during this period.                  Care Plan discussed with: Patient, Nursing Staff and >50% of time spent in counseling and coordination of care    Discussed:  Care Plan    Prophylaxis:  SCD's    Disposition:  Home w/Family           ___________________________________________________    Attending Physician: Elis Pineda MD

## 2022-02-09 NOTE — PROGRESS NOTES
02/09/22    Medicare pt has received, reviewed, and signed 2nd IM letter informing them of their right to appeal the discharge. Signed copied has been placed on pt bedside chart.

## 2022-02-09 NOTE — PROGRESS NOTES
Problem: Mobility Impaired (Adult and Pediatric)  Goal: *Acute Goals and Plan of Care (Insert Text)  Description: FUNCTIONAL STATUS PRIOR TO ADMISSION: Patient required minimal assistance for basic and instrumental ADLs. HOME SUPPORT PRIOR TO ADMISSION: The patient lived with spouse and required minimal assistance/contact guard assist for ambulation using a rolling walker. Physical Therapy Goals  Initiated 2/7/2022  1. Patient will move from supine to sit and sit to supine , scoot up and down, and roll side to side in bed with supervision/set-up within 7 day(s). 2.  Patient will transfer from bed to chair and chair to bed with minimal assistance/contact guard assist using the least restrictive device within 7 day(s). 3.  Patient will perform sit to stand with minimal assistance/contact guard assist within 7 day(s). 4.  Patient will ambulate with minimal assistance/contact guard assist for 100 feet with the least restrictive device within 7 day(s). Note:   PHYSICAL THERAPY TREATMENT  Patient: William Palm (73 y.o. male)  Date: 2/9/2022  Diagnosis: Atrial fibrillation with RVR ([facility] [address] [I48.91] <principal problem not specified>       Precautions:    Chart, physical therapy assessment, plan of care and goals were reviewed. ASSESSMENT  Patient continues with skilled PT services. Pt supine to sit with min to mod assist.Pt CGA to min assist sit to stand. Pt ambulated 12ft with RW CGA to min assist.Pt slightly unsteady and needs cues to direct RW through turns. Pt left sitting. Pt progressing slowly. Continue goals. PLAN :  Patient continues to benefit from skilled intervention to address the above impairments. Continue treatment per established plan of care. to address goals.     Recommendation for discharge: (in order for the patient to meet his/her long term goals)  Physical therapy at least 2 days/week in the home AND ensure assist and/or supervision for safety    This discharge recommendation:  Has been made in collaboration with the attending provider and/or case management    IF patient discharges home will need the following DME: rolling walker       SUBJECTIVE:       OBJECTIVE DATA SUMMARY:   Critical Behavior:  Neurologic State: Alert  Orientation Level: Oriented X4  Cognition: Follows commands  Safety/Judgement: Awareness of environment  Functional Mobility Training:  Bed Mobility:     Supine to Sit: Minimum assistance; Moderate assistance              Transfers:  Sit to Stand: Minimum assistance;Contact guard assistance  Stand to Sit: Contact guard assistance                             Balance:  Sitting: Intact  Standing: With support  Standing - Static: Fair  Ambulation/Gait Training:  Distance (ft): 12 Feet (ft)  Assistive Device: Gait belt;Walker, rolling  Ambulation - Level of Assistance: Contact guard assistance;Minimal assistance        Gait Abnormalities: Decreased step clearance        Base of Support: Narrowed     Speed/Jessenia: Pace decreased (<100 feet/min)  Step Length: Right shortened;Left shortened                    Activity Tolerance:   Fair to good    After treatment patient left in no apparent distress:   Sitting in chair    COMMUNICATION/COLLABORATION:   The patients plan of care was discussed with: Physical therapist.     Svetlana Reece PTA   Time Calculation: 33 mins

## 2022-02-10 NOTE — PROGRESS NOTES
0730: Bedside and Verbal shift change report given to Amairani RN (oncoming nurse) by Alo Gardner. Flash Perrin RN (offgoing nurse). Report included the following information SBAR, Kardex, Intake/Output, MAR and Recent Results.

## 2022-02-10 NOTE — PROGRESS NOTES
Problem: Mobility Impaired (Adult and Pediatric)  Goal: *Acute Goals and Plan of Care (Insert Text)  Description: FUNCTIONAL STATUS PRIOR TO ADMISSION: Patient required minimal assistance for basic and instrumental ADLs. HOME SUPPORT PRIOR TO ADMISSION: The patient lived with spouse and required minimal assistance/contact guard assist for ambulation using a rolling walker. Physical Therapy Goals  Initiated 2/7/2022  1. Patient will move from supine to sit and sit to supine , scoot up and down, and roll side to side in bed with supervision/set-up within 7 day(s). 2.  Patient will transfer from bed to chair and chair to bed with minimal assistance/contact guard assist using the least restrictive device within 7 day(s). 3.  Patient will perform sit to stand with minimal assistance/contact guard assist within 7 day(s). 4.  Patient will ambulate with minimal assistance/contact guard assist for 100 feet with the least restrictive device within 7 day(s). Outcome: Not Met   PHYSICAL THERAPY TREATMENT  Patient: Vickie Valladares (56 y.o. male)  Date: 2/10/2022  Diagnosis: Atrial fibrillation with RVR ([facility] [address] [I48.91] <principal problem not specified>       Precautions:  Falls; skin  Chart, physical therapy assessment, plan of care and goals were reviewed. ASSESSMENT  Patient continues with skilled PT services and is progressing towards goals slowly. He performs bed mobility, transfers, and ambulates in room using RW. SpO2 87% after ambulation using room air and rebounds to 92% with seated rest x 45 seconds and cues for pursed lip breathing. Pt declining further ambulation due to fatigue. Current plan is for thoracentesis tomorrow. Current Level of Function Impacting Discharge (mobility/balance): min A    Other factors to consider for discharge: spouse at bedside         PLAN :  Patient continues to benefit from skilled intervention to address the above impairments.   Continue treatment per established plan of care.  to address goals. Recommendation for discharge: (in order for the patient to meet his/her long term goals)  Physical therapy at least 2 days/week in the home AND ensure assist and/or supervision for safety with all functional mobility    This discharge recommendation:  Has not yet been discussed the attending provider and/or case management    IF patient discharges home will need the following DME: to be determined (TBD)       SUBJECTIVE:   Patient stated I'm going to rest for awhile, once seated in chair. Pt received supine, agreeable to PT and cleared by RN. OBJECTIVE DATA SUMMARY:   Critical Behavior:  Neurologic State: Alert  Orientation Level: Oriented X4  Cognition: Follows commands  Safety/Judgement: Awareness of environment  Functional Mobility Training:  Bed Mobility:     Supine to Sit: Additional time;Minimum assistance; Adaptive equipment     Scooting: Additional time;Contact guard assistance        Transfers:  Sit to Stand: Minimum assistance; Additional time  Stand to Sit: Contact guard assistance                             Balance:  Sitting: Without support  Sitting - Static: Good (unsupported)  Sitting - Dynamic: Good (unsupported)  Standing: With support  Standing - Static: Fair  Standing - Dynamic : Fair  Ambulation/Gait Training:  Distance (ft): 18 Feet (ft)  Assistive Device: Walker, rolling;Gait belt  Ambulation - Level of Assistance: Minimal assistance        Gait Abnormalities: Decreased step clearance              Speed/Jessenia: Pace decreased (<100 feet/min)  Step Length: Right shortened;Left shortened                                 Pain Rating:  States back discomfort at rest in bed; receiving medication from RN prior to PT. Reports improved with mobility.     Activity Tolerance:   requires rest breaks    After treatment patient left in no apparent distress:   Sitting in chair, Call bell within reach, Bed / chair alarm activated, and Caregiver / family present    COMMUNICATION/COLLABORATION:   The patients plan of care was discussed with: Registered nurse.      Dion Cazares, PT, DPT   Time Calculation: 23 mins

## 2022-02-10 NOTE — PROGRESS NOTES
Bedside shift change report given to [address] [place] [address] Line Luciano ZAZUETA (oncoming nurse) by Elizabeth John RN (offgoing nurse). Report included the following information SBAR, Intake/Output, MAR and Recent Results.

## 2022-02-10 NOTE — PROGRESS NOTES
2/10/2022  Case Management Progress Note    12:50 PM  Patient is 81year old male admitted 2/6 with sinus tachycardia  Patient's RUR is 18% yellow/moderate risk for readmission  Covid test: negative 2/6   Chart reviewed--patient discussed at IDR rounds  Per rounds this morning patient may be ready for discharge in the next day or two; may need thoracentesis. Patient is already accepted by [facility] home health, however noted per chart that patient may need a rolling walker and/or home oxygen (currently on 2L). Will need orders and documentation for both. Will continue to follow and assist as needed. Transition of Care Plan   1. Continue medical management/treatment  2. Home with [facility] when ready   3. Transportation tbd, may need WC van vs family  4. Follow up with PCP, specialties as needed  5.  CM will continue to follow    NATALIIA Blanco

## 2022-02-10 NOTE — PROGRESS NOTES
Navid Perdomo [facility]  [address] [facility], [address] [place] [address]  [phone]      Medical Progress Note      NAME: Karlene Perea   :  1935  MRM:  [id number]    Date of service: 2/10/2022  9:55 AM       Assessment and Plan:   1. Atrial fibrillation with RVR likely driven by fever and sepsis. rate is well controlled now. evaluated by cardiology.  Recent ECHO with EF 60%.  Hold Eliquis for possible thoracentesis. IR consulted.       2. Acute respiratory failure with hypoxia due to BL pleural effusion/ CHF. CXR: unchanged pleural effusion. RVP is negative. Appreciated pulmonary consult      3. Sepsis / Sinus tachycardia / Fever - unclear cause. RVP is negative, procalcitonin is not elevated. blood cx is negative so far. Stopped empiric ABx.      4. CHF (congestive heart failure), NYHA class I, chronic, diastolic/ BL pleural effusion- POA, not usually on any diuretic in setting of orthostatic hypotension. Evaluated by pulmonary      5.  Weakness / Hx of completed stroke / Parkinson disease - Worse than baseline. Continue sinemet, Fall precautions.  PT OT eval.      6. Dementia - supportive care.      7. Orthostatic hypotension - Continue midodrine     8. Anemia - likely due to chronic disease. Recent serologies unremarkable. Subjective:     Chief Complaint[de-identified] Patient was seen and examined as a follow up for afib with RVR. Chart was reviewed. ROS:  (bold if positive, if negative)    Tolerating PT  Tolerating Diet        Objective:     Last 24hrs VS reviewed since prior progress note.  Most recent are:    Visit Vitals  BP (!) 150/71 (BP 1 Location: Left lower arm)   Pulse 80   Temp 97.6 °F (36.4 °C)   Resp 18   Wt 80.8 kg (178 lb 2.1 oz)   SpO2 95%   BMI 24.64 kg/m²     SpO2 Readings from Last 6 Encounters:   02/10/22 95%   21 99%    O2 Flow Rate (L/min): 2 l/min       Intake/Output Summary (Last 24 hours) at 2/10/2022 0970  Last data filed at 2022  Gross per 24 hour   Intake 720 ml   Output 120 ml   Net 600 ml        Physical Exam:    Gen:  Well-developed, well-nourished, in no acute distress  HEENT:  Pink conjunctivae, PERRL, hearing intact to voice, moist mucous membranes  Neck:  Supple, without masses, thyroid non-tender  Resp:  No accessory muscle use, clear breath sounds without wheezes rales or rhonchi  Card:  No murmurs, normal S1, S2 without thrills, bruits or peripheral edema  Abd:  Soft, non-tender, non-distended, normoactive bowel sounds are present, no palpable organomegaly and no detectable hernias  Lymph:  No cervical or inguinal adenopathy  Musc:  No cyanosis or clubbing  Skin:  No rashes or ulcers, skin turgor is good  Neuro:  Cranial nerves are grossly intact, no focal motor weakness, follows commands appropriately  Psych:  Good insight, oriented to person, place and time, alert  __________________________________________________________________  Medications Reviewed: (see below)  Medications:     Current Facility-Administered Medications   Medication Dose Route Frequency    [Held by provider] apixaban (ELIQUIS) tablet 5 mg  5 mg Oral BID    carbidopa-levodopa (SINEMET)  mg per tablet 1.5 Tablet  1.5 Tablet Oral QID    midodrine (PROAMATINE) tablet 2.5 mg  2.5 mg Oral TID    sodium chloride (NS) flush 5-10 mL  5-10 mL IntraVENous PRN    acetaminophen (TYLENOL) tablet 650 mg  650 mg Oral Q6H PRN    Or    acetaminophen (TYLENOL) suppository 650 mg  650 mg Rectal Q6H PRN    polyethylene glycol (MIRALAX) packet 17 g  17 g Oral DAILY PRN    ondansetron (ZOFRAN ODT) tablet 4 mg  4 mg Oral Q8H PRN    Or    ondansetron (ZOFRAN) injection 4 mg  4 mg IntraVENous Q6H PRN        Lab Data Reviewed: (see below)  Lab Review:     Recent Labs     02/10/22  0515 02/08/22  0602   WBC 4.5 4.0*   HGB 7.9* 7.7*   HCT 26.5* 25.4*    434     Recent Labs     02/10/22  0515 02/08/22  0602   NA  --  141   K  --  4.0   CL  --  108   CO2  --  28   GLU  -- 124*   BUN  --  29*   CREA 1.10 1.23   CA  --  8.5   MG  --  2.2     No results found for: GLUCPOC  No results for input(s): PH, PCO2, PO2, HCO3, FIO2 in the last 72 hours. No results for input(s): INR, INREXT, INREXT in the last 72 hours.   All Micro Results     Procedure Component Value Units Date/Time    CULTURE, BLOOD [[id number]] Collected: 02/06/22 2059    Order Status: Completed Specimen: Blood Updated: 02/10/22 0547     Special Requests: NO SPECIAL REQUESTS        Culture result: NO GROWTH 4 DAYS       CULTURE, BLOOD [[id number]] Collected: 02/06/22 2047    Order Status: Completed Specimen: Blood Updated: 02/10/22 0547     Special Requests: NO SPECIAL REQUESTS        Culture result: NO GROWTH 4 DAYS       RESPIRATORY VIRUS PANEL W/COVID-19, PCR [[id number]] Collected: 02/06/22 2113    Order Status: Completed Specimen: Nasopharyngeal Updated: 02/07/22 0913     Adenovirus Not detected        Coronavirus 229E Not detected        Coronavirus HKU1 Not detected        Coronavirus CVNL63 Not detected        Coronavirus OC43 Not detected        SARS-CoV-2, PCR Not detected        Metapneumovirus Not detected        Rhinovirus and Enterovirus Not detected        Influenza A Not detected        Influenza A, subtype H1 Not detected        Influenza A, subtype H3 Not detected        INFLUENZA A H1N1 PCR Not detected        Influenza B Not detected        Parainfluenza 1 Not detected        Parainfluenza 2 Not detected        Parainfluenza 3 Not detected        Parainfluenza virus 4 Not detected        RSV by PCR Not detected        B. parapertussis, PCR Not detected        Bordetella pertussis - PCR Not detected        Chlamydophila pneumoniae DNA, QL, PCR Not detected        Mycoplasma pneumoniae DNA, QL, PCR Not detected       COVID-19 RAPID TEST [[id number]] Collected: 02/06/22 2047    Order Status: Completed Specimen: Nasopharyngeal Updated: 02/06/22 2139     Specimen source Nasopharyngeal        COVID-19 rapid test Not detected        Comment: Rapid Abbott ID Now       Rapid NAAT:  The specimen is NEGATIVE for SARS-CoV-2, the novel coronavirus associated with COVID-19. Negative results should be treated as presumptive and, if inconsistent with clinical signs and symptoms or necessary for patient management, should be tested with an alternative molecular assay. Negative results do not preclude SARS-CoV-2 infection and should not be used as the sole basis for patient management decisions. This test has been authorized by the FDA under an Emergency Use Authorization (EUA) for use by authorized laboratories. Fact sheet for Healthcare Providers: ConventionUpdate.co.nz  Fact sheet for Patients: ConventionUpdate.co.nz       Methodology: Isothermal Nucleic Acid Amplification         CULTURE, BLOOD, PAIRED [[id number]] Collected: 02/06/22 2047    Order Status: Canceled Specimen: Blood           I have reviewed notes of prior 24hr. Other pertinent lab: Total time spent with patient: 28 I personally reviewed chart, notes, data and current medications in the medical record. I have personally examined and treated the patient at bedside during this period.                  Care Plan discussed with: Patient, Nursing Staff and >50% of time spent in counseling and coordination of care    Discussed:  Care Plan    Prophylaxis:  SCD's    Disposition:  Home w/Family           ___________________________________________________    Attending Physician: Katie Franco MD

## 2022-02-10 NOTE — PROGRESS NOTES
1030  Pt medicated with tylenol at 0842 for his 6/10 back pain. He stated he felt a little relief but it hurts really bad now. Offered patient to reposition or get out of bed and into recliner, he declined and wishes to stay put. Notified Dr. Sandra Danielson MD stated to order one time 400 mg of ibuprofen. Will continue to monitor.

## 2022-02-10 NOTE — PROGRESS NOTES
Name: Amber Stringer: [address]   : 1935 Admit Date: 2022   Phone: [phone]  Room: [facility]   PCP: Miguel Cheng MD  MRN: [id number]   Date: 2/10/2022  Code: Full Code          Chart and notes reviewed. Data reviewed. I review the patient's current medications in the medical record at each encounter. I have evaluated and examined the patient. HPI:    8:27 AM       History was obtained from chart review. I was asked by Mitchell Guillen MD to see [facility] in consultation for a chief complaint of pleural effusions. History of Present Illness:  Mr. Jenna Condon is an 82 yo gentleman with a history of CHF, afib on Elquis, chronic bilateral pleural effusions, Parkinson's Disease, and dementia who is admitted for weakness. He comes in with weakness. He was evaluated by [facility] and found to be hypoxic to 85% on RA. He has a history of chronic bilateral effusions. He is follwed by [facility] and December he had bilateral thoracentesis ( and ). He was then admitted to [facility] - and found to have bilateral effusions. As his effusions were stable to post-thora images at [facility] and the concern for reaccumulation due to underlying CHF and fluid overload, the decision was made not proceed with thora at that time. Has persistent effusions on CTA today, but again not significantly changed from post-thora volumes. Labs: WBC 4.0, Hgb 8.1, , cr 1.26, CRP 7.33, rapid COVID negative, RVP penidng    Images reviewed:  CTA 2022: negative for PE; moderate bilateral effusions (L>R) with associated atelectasis, not significantly increased from December    Interval History:    No complaints today  Has been weaned off O2, sats 91%        Past Medical History:   Diagnosis Date    Anemia     Atrial fibrillation (HCC)     CHF (congestive heart failure), NYHA class I, chronic, diastolic (HCC)     has had to have lungs drained.     CKD (chronic kidney disease), stage III ([facility] [facility]     Hx of completed stroke     right sided affected    Hypoalbuminemia     Orthostatic hypotension     Parkinson disease ([facility]     Proteinuria        Past Surgical History:   Procedure Laterality Date    HX CHOLECYSTECTOMY      HX KNEE ARTHROSCOPY Left        No family history on file. Social History     Tobacco Use    Smoking status: Not on file    Smokeless tobacco: Not on file   Substance Use Topics    Alcohol use: Not on file       No Known Allergies    Current Facility-Administered Medications   Medication Dose Route Frequency    [Held by provider] apixaban (ELIQUIS) tablet 5 mg  5 mg Oral BID    carbidopa-levodopa (SINEMET)  mg per tablet 1.5 Tablet  1.5 Tablet Oral QID    midodrine (PROAMATINE) tablet 2.5 mg  2.5 mg Oral TID    sodium chloride (NS) flush 5-10 mL  5-10 mL IntraVENous PRN    acetaminophen (TYLENOL) tablet 650 mg  650 mg Oral Q6H PRN    Or    acetaminophen (TYLENOL) suppository 650 mg  650 mg Rectal Q6H PRN    polyethylene glycol (MIRALAX) packet 17 g  17 g Oral DAILY PRN    ondansetron (ZOFRAN ODT) tablet 4 mg  4 mg Oral Q8H PRN    Or    ondansetron (ZOFRAN) injection 4 mg  4 mg IntraVENous Q6H PRN         REVIEW OF SYSTEMS   12 point ROS negative except as stated in the HPI. Physical Exam:   Visit Vitals  /75 (BP 1 Location: Left upper arm, BP Patient Position: At rest)   Pulse 93   Temp 98.1 °F (36.7 °C)   Resp 16   Wt 80.8 kg (178 lb 2.1 oz)   SpO2 92%   BMI 24.64 kg/m²       General:  Awake, alert, no distress   Head:  Normocephalic, without obvious abnormality   Eyes:  Conjunctivae/corneas clear. Nose: Nares normal. Septum midline. Throat: Lips, mucosa, and tongue normal.    Neck: Supple, symmetrical, trachea midline, no adenopathy. Lungs:   Slightly diminished in the bases   Chest wall:  No tenderness or deformity. Heart:  Regular rate and rhythm, S1, S2 normal   Abdomen:   Soft, non-tender.  Bowel sounds normal. Extremities: Extremities normal, atraumatic, 1+ BLE edema   Pulses: 2+ and symmetric all extremities. Skin: Skin color, texture, turgor normal. No rashes or lesions       Neurologic: Grossly nonfocal       Lab Results   Component Value Date/Time    Sodium 141 02/08/2022 06:02 AM    Potassium 4.0 02/08/2022 06:02 AM    Chloride 108 02/08/2022 06:02 AM    CO2 28 02/08/2022 06:02 AM    BUN 29 (H) 02/08/2022 06:02 AM    Creatinine 1.10 02/10/2022 05:15 AM    Glucose 124 (H) 02/08/2022 06:02 AM    Calcium 8.5 02/08/2022 06:02 AM    Magnesium 2.2 02/08/2022 06:02 AM    Phosphorus 3.7 12/21/2021 04:12 AM    Lactic acid 1.1 02/06/2022 08:47 PM       Lab Results   Component Value Date/Time    WBC 4.5 02/10/2022 05:15 AM    HGB 7.9 (L) 02/10/2022 05:15 AM    PLATELET 864 46/42/1974 05:15 AM    MCV 82.3 02/10/2022 05:15 AM       Lab Results   Component Value Date/Time    INR 1.1 02/06/2022 08:47 PM    aPTT 32.7 (H) 02/06/2022 08:47 PM    Alk.  phosphatase 76 02/07/2022 05:12 AM    Protein, total 7.0 02/07/2022 05:12 AM    Albumin 2.6 (L) 02/07/2022 05:12 AM    Globulin 4.4 (H) 02/07/2022 05:12 AM       Lab Results   Component Value Date/Time    Iron 23 (L) 12/21/2021 04:12 AM    TIBC 207 (L) 12/21/2021 04:12 AM    Iron % saturation 11 (L) 12/21/2021 04:12 AM    Ferritin 340 02/06/2022 08:47 PM       Lab Results   Component Value Date/Time    C-Reactive protein 7.33 (H) 02/06/2022 08:47 PM    TSH 3.78 (H) 12/21/2021 04:12 AM        No results found for: PH, PHI, PCO2, PCO2I, PO2, PO2I, HCO3, HCO3I, FIO2, FIO2I    No results found for: CPK, RCK1, RCK2, RCK3, RCK4, CKNDX, CKND1, TROPT, TROIQ, BNPP, BNP     Lab Results   Component Value Date/Time    Culture result: NO GROWTH 4 DAYS 02/06/2022 08:59 PM    Culture result: NO GROWTH 4 DAYS 02/06/2022 08:47 PM    Culture result: MRSA NOT PRESENT 12/21/2021 05:12 AM    Culture result:  12/21/2021 05:12 AM     Screening of patient nares for MRSA is for surveillance purposes and, if positive, to facilitate isolation considerations in high risk settings. It is not intended for automatic decolonization interventions per se as regimens are not sufficiently effective to warrant routine use. No results found for: TOXA1, RPR, HBCM, HBSAG, HAAB, HCAB1, HAAT, G6PD, CRYAC, HIVGT, HIVR, HIV1, HIV12, HIVPC, HIVRPI    No results found for: VANCT, CPK    Lab Results   Component Value Date/Time    Color DARK YELLOW 12/20/2021 08:56 PM    Appearance TURBID (A) 12/20/2021 08:56 PM    pH (UA) 5.0 12/20/2021 08:56 PM    Protein 100 (A) 12/20/2021 08:56 PM    Glucose Negative 12/20/2021 08:56 PM    Ketone TRACE (A) 12/20/2021 08:56 PM    Blood SMALL (A) 12/20/2021 08:56 PM    Urobilinogen 1.0 12/20/2021 08:56 PM    Nitrites Negative 12/20/2021 08:56 PM    Leukocyte Esterase Negative 12/20/2021 08:56 PM    WBC 0-4 12/20/2021 08:56 PM    RBC 5-10 12/20/2021 08:56 PM    Bacteria Negative 12/20/2021 08:56 PM       IMPRESSION  · Chronic bilateral effusions  · CHF  · Afib, chronically on Elquis  · Volume overload    PLAN  · Goal sats 88% or higher-- on RA  · Will proceed with thoracentesis; will have to wait until Friday. Holding eliquis  · Cardiology following    [address]  [place], [place]

## 2022-02-11 PROBLEM — R50.9 FEVER: Status: RESOLVED | Noted: 2022-01-01 | Resolved: 2022-01-01

## 2022-02-11 PROBLEM — A41.9 SEPSIS (HCC): Status: RESOLVED | Noted: 2022-01-01 | Resolved: 2022-01-01

## 2022-02-11 PROBLEM — J96.01 ACUTE RESPIRATORY FAILURE WITH HYPOXIA (HCC): Status: RESOLVED | Noted: 2021-01-01 | Resolved: 2022-01-01

## 2022-02-11 PROBLEM — R00.0 SINUS TACHYCARDIA: Status: RESOLVED | Noted: 2022-01-01 | Resolved: 2022-01-01

## 2022-02-11 NOTE — PROGRESS NOTES
Name: Jose Armando Doan: [address]   : 1935 Admit Date: 2022   Phone: [phone]  Room: [facility]   PCP: Nikos Mcgarry MD  MRN: [id number]   Date: 2022  Code: Full Code          Chart and notes reviewed. Data reviewed. I review the patient's current medications in the medical record at each encounter. I have evaluated and examined the patient. HPI:    8:27 AM       History was obtained from chart review. I was asked by Mike Hartman MD to see [facility] in consultation for a chief complaint of pleural effusions. History of Present Illness:  Mr. Khoi Garvey is an 82 yo gentleman with a history of CHF, afib on Elquis, chronic bilateral pleural effusions, Parkinson's Disease, and dementia who is admitted for weakness. He comes in with weakness. He was evaluated by [facility] and found to be hypoxic to 85% on RA. He has a history of chronic bilateral effusions. He is follwed by [facility] and December he had bilateral thoracentesis ( and ). He was then admitted to [facility] - and found to have bilateral effusions. As his effusions were stable to post-thora images at [facility] and the concern for reaccumulation due to underlying CHF and fluid overload, the decision was made not proceed with thora at that time. Has persistent effusions on CTA today, but again not significantly changed from post-thora volumes. Labs: WBC 4.0, Hgb 8.1, , cr 1.26, CRP 7.33, rapid COVID negative, RVP penidng    Images reviewed:  CTA 2022: negative for PE; moderate bilateral effusions (L>R) with associated atelectasis, not significantly increased from December    Interval History:    Afebrile  BP elevated  Sats 92% on RA    ROS:  Feels well. Sitting in chair. Anxious to have thora done.         Past Medical History:   Diagnosis Date    Anemia     Atrial fibrillation ([address]     CHF (congestive heart failure), NYHA class I, chronic, diastolic (HCC)     has had to have lungs drained.  CKD (chronic kidney disease), stage III ([address]     Hx of completed stroke     right sided affected    Hypoalbuminemia     Orthostatic hypotension     Parkinson disease (HCC)     Proteinuria        Past Surgical History:   Procedure Laterality Date    HX CHOLECYSTECTOMY      HX KNEE ARTHROSCOPY Left        No family history on file. Social History     Tobacco Use    Smoking status: Not on file    Smokeless tobacco: Not on file   Substance Use Topics    Alcohol use: Not on file       No Known Allergies    Current Facility-Administered Medications   Medication Dose Route Frequency    [Held by provider] apixaban (ELIQUIS) tablet 5 mg  5 mg Oral BID    carbidopa-levodopa (SINEMET)  mg per tablet 1.5 Tablet  1.5 Tablet Oral QID    midodrine (PROAMATINE) tablet 2.5 mg  2.5 mg Oral TID    sodium chloride (NS) flush 5-10 mL  5-10 mL IntraVENous PRN    acetaminophen (TYLENOL) tablet 650 mg  650 mg Oral Q6H PRN    Or    acetaminophen (TYLENOL) suppository 650 mg  650 mg Rectal Q6H PRN    polyethylene glycol (MIRALAX) packet 17 g  17 g Oral DAILY PRN    ondansetron (ZOFRAN ODT) tablet 4 mg  4 mg Oral Q8H PRN    Or    ondansetron (ZOFRAN) injection 4 mg  4 mg IntraVENous Q6H PRN         REVIEW OF SYSTEMS   12 point ROS negative except as stated in the HPI. Physical Exam:   Visit Vitals  BP (!) 159/106 (BP 1 Location: Right upper arm, BP Patient Position: Sitting)   Pulse 85   Temp 98 °F (36.7 °C)   Resp 14   Ht 5' 11.3\" (1.811 m)   Wt 68.5 kg (151 lb)   SpO2 92%   BMI 20.88 kg/m²       General:  Awake, alert, no distress   Head:  Normocephalic, without obvious abnormality   Eyes:  Conjunctivae/corneas clear. Nose: Nares normal. Septum midline. Throat: Lips, mucosa, and tongue normal.    Neck: Supple, symmetrical, trachea midline, no adenopathy. Lungs:   Slightly diminished in the bases   Chest wall:  No tenderness or deformity.    Heart:  Regular rate and rhythm, S1, S2 normal   Abdomen:   Soft, non-tender. Bowel sounds normal.   Extremities: Extremities normal, atraumatic, 1+ BLE edema   Pulses: 2+ and symmetric all extremities. Skin: Skin color, texture, turgor normal. No rashes or lesions       Neurologic: Grossly nonfocal       Lab Results   Component Value Date/Time    Sodium 141 02/08/2022 06:02 AM    Potassium 4.0 02/08/2022 06:02 AM    Chloride 108 02/08/2022 06:02 AM    CO2 28 02/08/2022 06:02 AM    BUN 29 (H) 02/08/2022 06:02 AM    Creatinine 1.10 02/10/2022 05:15 AM    Glucose 124 (H) 02/08/2022 06:02 AM    Calcium 8.5 02/08/2022 06:02 AM    Magnesium 2.2 02/08/2022 06:02 AM    Phosphorus 3.7 12/21/2021 04:12 AM    Lactic acid 1.1 02/06/2022 08:47 PM       Lab Results   Component Value Date/Time    WBC 4.5 02/10/2022 05:15 AM    HGB 7.9 (L) 02/10/2022 05:15 AM    PLATELET 512 11/82/3637 05:15 AM    MCV 82.3 02/10/2022 05:15 AM       Lab Results   Component Value Date/Time    INR 1.1 02/06/2022 08:47 PM    aPTT 32.7 (H) 02/06/2022 08:47 PM    Alk.  phosphatase 76 02/07/2022 05:12 AM    Protein, total 7.0 02/07/2022 05:12 AM    Albumin 2.6 (L) 02/07/2022 05:12 AM    Globulin 4.4 (H) 02/07/2022 05:12 AM       Lab Results   Component Value Date/Time    Iron 23 (L) 12/21/2021 04:12 AM    TIBC 207 (L) 12/21/2021 04:12 AM    Iron % saturation 11 (L) 12/21/2021 04:12 AM    Ferritin 340 02/06/2022 08:47 PM       Lab Results   Component Value Date/Time    C-Reactive protein 7.33 (H) 02/06/2022 08:47 PM    TSH 3.78 (H) 12/21/2021 04:12 AM        No results found for: PH, PHI, PCO2, PCO2I, PO2, PO2I, HCO3, HCO3I, FIO2, FIO2I    No results found for: CPK, RCK1, RCK2, RCK3, RCK4, CKNDX, CKND1, TROPT, TROIQ, BNPP, BNP     Lab Results   Component Value Date/Time    Culture result: NO GROWTH 5 DAYS 02/06/2022 08:59 PM    Culture result: NO GROWTH 5 DAYS 02/06/2022 08:47 PM    Culture result: MRSA NOT PRESENT 12/21/2021 05:12 AM    Culture result:  12/21/2021 05:12 AM     Screening of patient nares for MRSA is for surveillance purposes and, if positive, to facilitate isolation considerations in high risk settings. It is not intended for automatic decolonization interventions per se as regimens are not sufficiently effective to warrant routine use. No results found for: TOXA1, RPR, HBCM, HBSAG, HAAB, HCAB1, HAAT, G6PD, CRYAC, HIVGT, HIVR, HIV1, HIV12, HIVPC, HIVRPI    No results found for: VANCT, CPK    Lab Results   Component Value Date/Time    Color DARK YELLOW 12/20/2021 08:56 PM    Appearance TURBID (A) 12/20/2021 08:56 PM    pH (UA) 5.0 12/20/2021 08:56 PM    Protein 100 (A) 12/20/2021 08:56 PM    Glucose Negative 12/20/2021 08:56 PM    Ketone TRACE (A) 12/20/2021 08:56 PM    Blood SMALL (A) 12/20/2021 08:56 PM    Urobilinogen 1.0 12/20/2021 08:56 PM    Nitrites Negative 12/20/2021 08:56 PM    Leukocyte Esterase Negative 12/20/2021 08:56 PM    WBC 0-4 12/20/2021 08:56 PM    RBC 5-10 12/20/2021 08:56 PM    Bacteria Negative 12/20/2021 08:56 PM       IMPRESSION  · Chronic bilateral effusions  · CHF  · Afib, chronically on Elquis  · Volume overload    PLAN  · Goal sats 88% or higher-- on RA  · Thoracentesis today. Okay to discharge following procedure.   · Cardiology following    Joe Malcolm NP

## 2022-02-11 NOTE — PROGRESS NOTES
[facility] [address]  [address] [facility], [address] [place] [address]  [phone]      Medical Progress Note      NAME: Michelle Holder   :  1935  MRM:  [id number]    Date of service: 2022  9:55 AM       Assessment and Plan:   1. Atrial fibrillation with RVR likely driven by fever and sepsis. rate is well controlled now. evaluated by cardiology.  Recent ECHO with EF 60%.  Hold Eliquis for possible thoracentesis. IR consulted.       2. Acute respiratory failure with hypoxia due to BL pleural effusion/ CHF. CXR: unchanged pleural effusion. RVP is negative. Appreciated pulmonary consult. Pt has LT side pleural fluid drainage on . FU with pulmonary      3. Sepsis / Sinus tachycardia / Fever - unclear cause. RVP is negative, procalcitonin is not elevated. blood cx is negative so far. Stopped empiric ABx.      4. CHF (congestive heart failure), NYHA class I, chronic, diastolic/ BL pleural effusion- POA, not usually on any diuretic in setting of orthostatic hypotension. Evaluated by pulmonary      5.  Weakness / Hx of completed stroke / Parkinson disease - Worse than baseline. Continue sinemet, Fall precautions.  PT OT eval.      6. Dementia - supportive care.      7. Orthostatic hypotension - Continue midodrine     8. Anemia - likely due to chronic disease. Recent serologies unremarkable. Subjective:     Chief Complaint[de-identified] Patient was seen and examined as a follow up for afib with RVR. Chart was reviewed. C/o chest pain after thoracocentesis. ROS:  (bold if positive, if negative)    Tolerating PT  Tolerating Diet        Objective:     Last 24hrs VS reviewed since prior progress note.  Most recent are:    Visit Vitals  BP (!) 159/106 (BP 1 Location: Right upper arm, BP Patient Position: Sitting)   Pulse 85   Temp 98 °F (36.7 °C)   Resp 14   Ht 5' 11.3\" (1.811 m)   Wt 68.5 kg (151 lb)   SpO2 92%   BMI 20.88 kg/m²     SpO2 Readings from Last 6 Encounters:   22 92% 12/24/21 99%    O2 Flow Rate (L/min): 2 l/min       Intake/Output Summary (Last 24 hours) at 2/11/2022 1304  Last data filed at 2/11/2022 1220  Gross per 24 hour   Intake 112 ml   Output 1060 ml   Net -948 ml        Physical Exam:    Gen:  Well-developed, well-nourished, in no acute distress  HEENT:  Pink conjunctivae, PERRL, hearing intact to voice, moist mucous membranes  Neck:  Supple, without masses, thyroid non-tender  Resp:  No accessory muscle use, clear breath sounds without wheezes rales or rhonchi  Card:  No murmurs, normal S1, S2 without thrills, bruits or peripheral edema  Abd:  Soft, non-tender, non-distended, normoactive bowel sounds are present, no palpable organomegaly and no detectable hernias  Lymph:  No cervical or inguinal adenopathy  Musc:  No cyanosis or clubbing  Skin:  No rashes or ulcers, skin turgor is good  Neuro:  Cranial nerves are grossly intact, no focal motor weakness, follows commands appropriately  Psych:  Good insight, oriented to person, place and time, alert  __________________________________________________________________  Medications Reviewed: (see below)  Medications:     Current Facility-Administered Medications   Medication Dose Route Frequency    [Held by provider] apixaban (ELIQUIS) tablet 5 mg  5 mg Oral BID    carbidopa-levodopa (SINEMET)  mg per tablet 1.5 Tablet  1.5 Tablet Oral QID    midodrine (PROAMATINE) tablet 2.5 mg  2.5 mg Oral TID    sodium chloride (NS) flush 5-10 mL  5-10 mL IntraVENous PRN    acetaminophen (TYLENOL) tablet 650 mg  650 mg Oral Q6H PRN    Or    acetaminophen (TYLENOL) suppository 650 mg  650 mg Rectal Q6H PRN    polyethylene glycol (MIRALAX) packet 17 g  17 g Oral DAILY PRN    ondansetron (ZOFRAN ODT) tablet 4 mg  4 mg Oral Q8H PRN    Or    ondansetron (ZOFRAN) injection 4 mg  4 mg IntraVENous Q6H PRN        Lab Data Reviewed: (see below)  Lab Review:     Recent Labs     02/10/22  0515   WBC 4.5   HGB 7.9*   HCT 26.5*   PLT 239     Recent Labs     02/10/22  0515   CREA 1.10     No results found for: GLUCPOC  No results for input(s): PH, PCO2, PO2, HCO3, FIO2 in the last 72 hours. No results for input(s): INR, INREXT, INREXT in the last 72 hours.   All Micro Results     Procedure Component Value Units Date/Time    CULTURE, BLOOD [[id number]] Collected: 02/06/22 2047    Order Status: Completed Specimen: Blood Updated: 02/11/22 0703     Special Requests: NO SPECIAL REQUESTS        Culture result: NO GROWTH 5 DAYS       CULTURE, BLOOD [[id number]] Collected: 02/06/22 2059    Order Status: Completed Specimen: Blood Updated: 02/11/22 0703     Special Requests: NO SPECIAL REQUESTS        Culture result: NO GROWTH 5 DAYS       RESPIRATORY VIRUS PANEL W/COVID-19, PCR [[id number]] Collected: 02/06/22 2113    Order Status: Completed Specimen: Nasopharyngeal Updated: 02/07/22 0913     Adenovirus Not detected        Coronavirus 229E Not detected        Coronavirus HKU1 Not detected        Coronavirus CVNL63 Not detected        Coronavirus OC43 Not detected        SARS-CoV-2, PCR Not detected        Metapneumovirus Not detected        Rhinovirus and Enterovirus Not detected        Influenza A Not detected        Influenza A, subtype H1 Not detected        Influenza A, subtype H3 Not detected        INFLUENZA A H1N1 PCR Not detected        Influenza B Not detected        Parainfluenza 1 Not detected        Parainfluenza 2 Not detected        Parainfluenza 3 Not detected        Parainfluenza virus 4 Not detected        RSV by PCR Not detected        B. parapertussis, PCR Not detected        Bordetella pertussis - PCR Not detected        Chlamydophila pneumoniae DNA, QL, PCR Not detected        Mycoplasma pneumoniae DNA, QL, PCR Not detected       COVID-19 RAPID TEST [[id number]] Collected: 02/06/22 2047    Order Status: Completed Specimen: Nasopharyngeal Updated: 02/06/22 2139     Specimen source Nasopharyngeal        COVID-19 rapid test Not detected Comment: Rapid Abbott ID Now       Rapid NAAT:  The specimen is NEGATIVE for SARS-CoV-2, the novel coronavirus associated with COVID-19. Negative results should be treated as presumptive and, if inconsistent with clinical signs and symptoms or necessary for patient management, should be tested with an alternative molecular assay. Negative results do not preclude SARS-CoV-2 infection and should not be used as the sole basis for patient management decisions. This test has been authorized by the FDA under an Emergency Use Authorization (EUA) for use by authorized laboratories. Fact sheet for Healthcare Providers: [employer]  Fact sheet for Patients: [employer]       Methodology: Isothermal Nucleic Acid Amplification         CULTURE, BLOOD, PAIRED [[id number]] Collected: 02/06/22 2047    Order Status: Canceled Specimen: Blood           I have reviewed notes of prior 24hr. Other pertinent lab: Total time spent with patient: 28 I personally reviewed chart, notes, data and current medications in the medical record. I have personally examined and treated the patient at bedside during this period.                  Care Plan discussed with: Patient, Nursing Staff and >50% of time spent in counseling and coordination of care    Discussed:  Care Plan    Prophylaxis:  SCD's    Disposition:  Home w/Family           ___________________________________________________    Attending Physician: Belle Cutler MD

## 2022-02-11 NOTE — PROGRESS NOTES
Comprehensive Nutrition Assessment    Type and Reason for Visit: Initial,RD nutrition re-screen/LOS    Nutrition Recommendations/Plan:   1. Continue Regular, 4 Carb Choice diet  2. Provide Glucerna BID to aid in meeting kcal/protein needs (440 kcal, 52 g carbs, 20 g protein)   3. Please provide encouragement with all meals    Nutrition Assessment:    Pt is an 81year old male admitted with Atrial fibrillation with RVR ([facility] [address] [I48.91]. He  has a past medical history of Anemia, Atrial fibrillation, CHF, NYHA class I, CKD stage III, completed stroke, Hypoalbuminemia, Orthostatic hypotension, Parkinson disease, and Proteinuria. Per documentation, patient has lost 12# (7.3%) x ~2 months, not clinically significant for timeframe. PO intake averaging 25% of all meals. Patient had consumed 75% of breakfast at time of RD visit. Information gathered from spouse, at bedside. Patient attempted to provide information as well. #, with some mild weight loss and decreased appetite. Patient states he 'doesn't eat hospital food well'. NKFA. No N/V/D. Denies chewing/swallowing problems. Currently on 2 L O2. Patient voiced acceptance of protein supplement, spouse unsure if patient will consume. Unsure method of weight collection for 2/7/2022 weight; spouse declines patient weighing this much \"in a while\".      Last 3 Recorded Weights in this Encounter    02/07/22 0715 02/11/22 0444   Weight: 80.8 kg (178 lb 2.1 oz) 68.5 kg (151 lb)     Wt Readings from Last 10 Encounters:   02/11/22 68.5 kg (151 lb)   12/23/21 74 kg (163 lb 2.3 oz)     Malnutrition Assessment:  Malnutrition Status:  Mild malnutrition    Context:  Acute illness     Findings of the 6 clinical characteristics of malnutrition:   Energy Intake:  7 - 50% or less of est energy requirements for 5 or more days  Weight Loss:  No significant weight loss     Body Fat Loss:  No significant body fat loss,     Muscle Mass Loss:  No significant muscle mass loss,    Fluid Accumulation:  No significant fluid accumulation,     Strength:  Not performed     Estimated Daily Nutrient Needs:  Energy (kcal): 4174-1149 (25-30); Weight Used for Energy Requirements: Current  Protein (g): 69-83 (1.0-1.2); Weight Used for Protein Requirements: Current  Fluid (ml/day): 2072-7680; Method Used for Fluid Requirements: 1 ml/kcal    Nutrition Related Findings:    ABD: Fair appetite with active bowel sounds   Last BM: 2/10  Edema: LLE: Trace (2/10/2022  7:33 PM)  RLE: Trace (2/10/2022  7:33 PM)    Nutr. Labs:  Lab Results   Component Value Date/Time    GFR est AA >60 02/10/2022 05:15 AM    GFR est non-AA >60 02/10/2022 05:15 AM    Creatinine 1.10 02/10/2022 05:15 AM    BUN 29 (H) 02/08/2022 06:02 AM    Sodium 141 02/08/2022 06:02 AM    Potassium 4.0 02/08/2022 06:02 AM    Chloride 108 02/08/2022 06:02 AM    CO2 28 02/08/2022 06:02 AM     Lab Results   Component Value Date/Time    Glucose 124 (H) 02/08/2022 06:02 AM     Lab Results   Component Value Date/Time    Hemoglobin A1c 6.0 (H) 12/21/2021 04:12 AM       Nutr. Meds:  Sinemet, midodrine, zofran PRN, miralax PRN    Wounds:    None       Current Nutrition Therapies:  ADULT DIET Regular; 4 carb choices (60 gm/meal)    Anthropometric Measures:  · Height:  5' 11.3\" (181.1 cm)  · Current Body Wt:  68.5 kg (151 lb)   · Admission Body Wt:  178 lb    · Usual Body Wt:  72.6 kg (160 lb)     · Ideal Body Wt:  174 lbs:  86.8 %   · Body mass index is 20.88 kg/m².   · BMI Category:  Underweight (BMI less than 22) age over 72       Nutrition Diagnosis:   · Mild malnutrition related to increased demand for energy/nutrients,cognitive or neurological impairment,inadequate protein-energy intake as evidenced by intake 0-25% (confusion, requires encouragement with meals)    Nutrition Interventions:   Food and/or Nutrient Delivery: Continue current diet,Start oral nutrition supplement  Nutrition Education and Counseling: No recommendations at this time  Coordination of Nutrition Care: Continue to monitor while inpatient,Interdisciplinary rounds    Goals:  PO intake >/= 75% of all meals and supplements within 3 - 5 days       Nutrition Monitoring and Evaluation:   Behavioral-Environmental Outcomes: None identified  Food/Nutrient Intake Outcomes: Food and nutrient intake,Supplement intake  Physical Signs/Symptoms Outcomes: Biochemical data,Skin,Weight,Meal time behavior    Discharge Planning:     Too soon to determine     Electronically signed by Angela Mcgrath RD on 4/36/2173  Contact: [phone] or via [employer]

## 2022-02-11 NOTE — DISCHARGE SUMMARY
Hospitalist Discharge Summary     Patient ID:    Judi Guzman  [id number]  55 y.o.  1935    Admit date of service: 2/6/2022    Discharge date of service: 2/11/2022    Admission Diagnoses: Atrial fibrillation with RVR ([facility] [I48.91]    Chronic Diagnoses:    Problem List as of 2/11/2022 Date Reviewed: 2/6/2022          Codes Class Noted - Resolved    Atrial fibrillation with RVR ([facility] ICD-10-CM: I48.91  ICD-9-CM: 427.31  Unknown - Present        CHF (congestive heart failure), NYHA class I, chronic, diastolic (HCC) [id number]: I50.32  ICD-9-CM: 428.32  Unknown - Present    Overview Signed 2/6/2022  8:35 PM by Rani Mullins MD     has had to have lungs drained. Hx of completed stroke ICD-10-CM: Z86.73  ICD-9-CM: V12.54  Unknown - Present    Overview Signed 2/6/2022  8:35 PM by Rani Mullins MD     right sided affected             Orthostatic hypotension ICD-10-CM: I95.1  ICD-9-CM: 458.0  Unknown - Present        Parkinson disease ([facility] ICD-10-CM: Thyra Cumber  ICD-9-CM: 332.0  Unknown - Present        Weakness generalized ICD-10-CM: R53.1  ICD-9-CM: 780.79  2/6/2022 - Present        Anemia ICD-10-CM: D64.9  ICD-9-CM: 822. 9  Unknown - Present        Hypoalbuminemia ICD-10-CM: E88.09  ICD-9-CM: 273.8  Unknown - Present        Proteinuria ICD-10-CM: R80.9  ICD-9-CM: 791.0  Unknown - Present        CKD (chronic kidney disease), stage III ([facility] ICD-10-CM: N18.30  ICD-9-CM: 571. 3  Unknown - Present        Atrial fibrillation (HCC) ICD-10-CM: I48.91  ICD-9-CM: 427.31  Unknown - Present        RESOLVED: Sinus tachycardia ICD-10-CM: R00.0  ICD-9-CM: 427.89  2/6/2022 - 2/11/2022        RESOLVED: Sepsis ([facility] [address] ICD-10-CM: A41.9  ICD-9-CM: 038.9, 995.91  2/6/2022 - 2/11/2022        RESOLVED: Fever ICD-10-CM: R50.9  ICD-9-CM: 780.60  2/6/2022 - 2/11/2022        RESOLVED: Acute respiratory failure with hypoxia [facility]) ICD-10-CM: J96.01  ICD-9-CM: 518.81  12/20/2021 - 2/11/2022              Discharge Medications:   Current Discharge Medication List      CONTINUE these medications which have NOT CHANGED    Details   apixaban (Eliquis) 5 mg tablet Take 5 mg by mouth two (2) times a day. midodrine (PROAMATINE) 2.5 mg tablet Take 2.5 mg by mouth three (3) times daily. carbidopa-levodopa (SINEMET)  mg per tablet Take 1.5 Tablets by mouth four (4) times daily. acetaminophen (Tylenol Extra Strength) 500 mg tablet Take 1,000 mg by mouth two (2) times a day. Follow up Care:    1. Renee Steinberg MD in 1-2 weeks  2. Dr Edwardo Robles:  Cardiac Diet    Disposition:  Home. Advanced Directive:    Discharge Exam:  See today's note. CONSULTATIONS: Cardiology and Pulmonary/Intensive care    Significant Diagnostic Studies:   Recent Labs     02/10/22  0515   WBC 4.5   HGB 7.9*   HCT 26.5*        Recent Labs     02/10/22  0515   CREA 1.10     No results for input(s): ALT, AP, TBIL, TBILI, TP, ALB, GLOB, GGT, AML, LPSE in the last 72 hours. No lab exists for component: SGOT, GPT, AMYP, HLPSE  No results for input(s): INR, PTP, APTT, INREXT in the last 72 hours. No results for input(s): FE, TIBC, PSAT, FERR in the last 72 hours. No results for input(s): PH, PCO2, PO2 in the last 72 hours. No results for input(s): CPK, CKMB in the last 72 hours. No lab exists for component: TROPONINI  No results found for: Sweta 57:   1.  Atrial fibrillation with RVR likely driven by fever and sepsis. rate is well controlled now. evaluated by cardiology.  Recent ECHO with EF 60%. Continue Eliquis         2.  Acute respiratory failure with hypoxia due to BL pleural effusion/ CHF.  off O2. CXR: unchanged pleural effusion. RVP is negative. Appreciated pulmonary consult. Pt has LT side pleural fluid drainage on 2/11. FU with pulmonary      3.  Sepsis / Sinus tachycardia / Fever - unclear cause. RVP is negative, procalcitonin is not elevated.  blood cx is negative so far. Stopped empiric ABx.       4.  CHF (congestive heart failure), NYHA class I, chronic, diastolic/ BL pleural effusion- POA, not usually on any diuretic in setting of orthostatic hypotension. Evaluated by pulmonary      5.  Weakness / Hx of completed stroke / Parkinson disease - Worse than baseline. Continue sinemet, Fall precautions.  PT OT eval.      6.  Dementia - supportive care.      7.  Orthostatic hypotension - Continue midodrine     8.  Anemia - likely due to chronic disease. Recent serologies unremarkable. Discharged in improved condition.     Spent 35 minutes    Signed:  Loralie Severs, MD  2/11/2022  2:31 PM

## 2022-02-11 NOTE — PROGRESS NOTES
Discharge instructions/AVS discussed with pt and his wife. They verbalize understanding of all instructions. Questions about instructions denied and pt's wife has e-signed AVS. Pt discharged per MD order, being driven home by his wife. Pt was transferred to car via wheelchair, assisted by PCT. Pt in no apparent distress at time of discharge and with no complaints.

## 2022-02-11 NOTE — PROGRESS NOTES
2/11/2022  Case Management Progress Note    3:53 PM  RW delivered to patient    NATALIIA Vogel    2:14 PM  Patient is 81year old male admitted 2/6 with sinus tachycardia  Patient's RUR is 16% yellow/moderate risk for readmission  Covid test: negative 2/6   Chart reviewed--patient discussed at IDR rounds  Per rounds this morning patient needs thoracentesis with IR, per chart looks like it was done today. Patient is set up for home health with [facility], they will just need to know when he discharges. Will continue to follow and assist.     Transition of Care Plan   1. Continue medical management/treatment  2. Home with [facility]   3. May need RW--latest PT note not clear  4. Family to transport at discharge  5.  CM will continue to follow    NATALIIA Vogel

## 2022-02-11 NOTE — PROGRESS NOTES
0730: Bedside and Verbal shift change report given to [facility] RN (oncoming nurse) by Leopold Fritz. O, RN (offgoing nurse). Report included the following information SBAR, Kardex, Intake/Output, MAR and Recent Results.

## 2022-02-11 NOTE — PROGRESS NOTES
Physician Progress Note      Carina Villalobos  CSN #:                  [id number]  :                       1935  ADMIT DATE:       2022 8:16 PM  [address] [place] DATE:  RESPONDING  PROVIDER #:        Berta Bain MD          QUERY TEXT:    Good afternoon. Patient admitted with Sepsis, Afib, Acute Resp Failure, CHF, Weakness  an RD consult was noted on     If possible, please document in progress notes and discharge summary if you are evaluating and /or treating any of the following: The medical record reflects the following:  Risk Factors: Age, CKD, CHF, proteinuria, Parkinson's Disease  Clinical Indicators: 50% or less of est energy requirements for 5 or more days, poor appetite, acute illness 12 lb weight loss over last 2 months  Treatment:  Glucerna BID to aid in meeting kcal/protein needs, lab draws, RD following  Thank you  Odetta Severance RN [facility]  [phone]  _______________________________________________      ASPEN Criteria:  https://aspenjournals. onlinelibrary. smith. com/doi/full/10.1177/4018593686731754  Options provided:  -- Protein calorie malnutrition mild  -- Protein calorie malnutrition moderate  -- Other - I will add my own diagnosis  -- Disagree - Not applicable / Not valid  -- Disagree - Clinically unable to determine / Unknown  -- Refer to Clinical Documentation Reviewer    PROVIDER RESPONSE TEXT:    This patient has mild protein calorie malnutrition.     Query created by: Ana Cristina Pressley on 2022 2:34 PM      Electronically signed by:  Berta Bain MD 2022 2:35 PM

## 2022-02-11 NOTE — DISCHARGE INSTRUCTIONS
ACUTE DIAGNOSES:  Atrial fibrillation with RVR ([facility] [I48.91]    CHRONIC MEDICAL DIAGNOSES:  Problem List as of 2/11/2022 Date Reviewed: 2/6/2022          Codes Class Noted - Resolved    Atrial fibrillation with RVR ([facility] ICD-10-CM: I48.91  ICD-9-CM: 427.31  Unknown - Present        CHF (congestive heart failure), NYHA class I, chronic, diastolic (HCC) [id number]: I50.32  ICD-9-CM: 428.32  Unknown - Present    Overview Signed 2/6/2022  8:35 PM by Naty Butler MD     has had to have lungs drained. Hx of completed stroke ICD-10-CM: Z86.73  ICD-9-CM: V12.54  Unknown - Present    Overview Signed 2/6/2022  8:35 PM by Naty Butler MD     right sided affected             Orthostatic hypotension ICD-10-CM: I95.1  ICD-9-CM: 458.0  Unknown - Present        Parkinson disease ([facility] ICD-10-CM: Nimisha Nett  ICD-9-CM: 332.0  Unknown - Present        Weakness generalized ICD-10-CM: R53.1  ICD-9-CM: 780.79  2/6/2022 - Present        Anemia ICD-10-CM: D64.9  ICD-9-CM: 476. 9  Unknown - Present        Hypoalbuminemia ICD-10-CM: E88.09  ICD-9-CM: 273.8  Unknown - Present        Proteinuria ICD-10-CM: R80.9  ICD-9-CM: 791.0  Unknown - Present        CKD (chronic kidney disease), stage III ([facility] ICD-10-CM: N18.30  ICD-9-CM: 049. 3  Unknown - Present        Atrial fibrillation ([facility] ICD-10-CM: I48.91  ICD-9-CM: 427.31  Unknown - Present        RESOLVED: Sinus tachycardia ICD-10-CM: R00.0  ICD-9-CM: 427.89  2/6/2022 - 2/11/2022        RESOLVED: Sepsis ([facility] ICD-10-CM: A41.9  ICD-9-CM: 038.9, 995.91  2/6/2022 - 2/11/2022        RESOLVED: Fever ICD-10-CM: R50.9  ICD-9-CM: 780.60  2/6/2022 - 2/11/2022        RESOLVED: Acute respiratory failure with hypoxia [facility]) ICD-10-CM: J96.01  ICD-9-CM: 518.81  12/20/2021 - 2/11/2022              DISCHARGE MEDICATIONS:          · It is important that you take the medication exactly as they are prescribed.    · Keep your medication in the bottles provided by the pharmacist and keep a list of the medication names, dosages, and times to be taken in your wallet. · Do not take other medications without consulting your doctor. DIET:  Cardiac Diet    ACTIVITY: Activity as tolerated    ADDITIONAL INFORMATION: If you experience any of the following symptoms then please call your primary care physician or return to the emergency room if you cannot get hold of your doctor: Fever, chills, nausea, vomiting, diarrhea, change in mentation, falling, bleeding, shortness of breath. FOLLOW UP CARE:  Dr. Ravin Gordon MD  you are to call and set up an appointment to see them in 5 days. Follow-up with cardiology    Follow-up with pulmonary, Dr Robel Ceballos obtained by :  I understand that if any problems occur once I am at home I am to contact my physician. I understand and acknowledge receipt of the instructions indicated above.                                                                                                                                            Physician's or R.N.'s Signature                                                                  Date/Time                                                                                                                                              Patient or Representative Signature                                                          Date/Time

## 2022-02-11 NOTE — PROGRESS NOTES
Problem: Mobility Impaired (Adult and Pediatric)  Goal: *Acute Goals and Plan of Care (Insert Text)  Description: FUNCTIONAL STATUS PRIOR TO ADMISSION: Patient required minimal assistance for basic and instrumental ADLs. HOME SUPPORT PRIOR TO ADMISSION: The patient lived with spouse and required minimal assistance/contact guard assist for ambulation using a rolling walker. Physical Therapy Goals  Initiated 2/7/2022  1. Patient will move from supine to sit and sit to supine , scoot up and down, and roll side to side in bed with supervision/set-up within 7 day(s). 2.  Patient will transfer from bed to chair and chair to bed with minimal assistance/contact guard assist using the least restrictive device within 7 day(s). 3.  Patient will perform sit to stand with minimal assistance/contact guard assist within 7 day(s). 4.  Patient will ambulate with minimal assistance/contact guard assist for 100 feet with the least restrictive device within 7 day(s). Note:   PHYSICAL THERAPY TREATMENT  Patient: Robyn Sexton (10 y.o. male)  Date: 2/11/2022  Diagnosis: Atrial fibrillation with RVR ([facility] [facility] [I48.91] <principal problem not specified>       Precautions:    Chart, physical therapy assessment, plan of care and goals were reviewed. ASSESSMENT  Patient continues with skilled PT services. Pt sit to stand with CGA. Pt ambulated 40ft with RW CGA. Pt given cues to direct RW through turns. Pt left sitting in chair. Pt progressing with mobility. Continue goals. PLAN :  Patient continues to benefit from skilled intervention to address the above impairments. Continue treatment per established plan of care. to address goals.     Recommendation for discharge: (in order for the patient to meet his/her long term goals)  Physical therapy at least 2 days/week in the home AND ensure assist and/or supervision    This discharge recommendation:  Has been made in collaboration with the attending provider and/or case management    IF patient discharges home will need the following DME: rolling walker       SUBJECTIVE:       OBJECTIVE DATA SUMMARY:   Critical Behavior:  Neurologic State: Alert  Orientation Level: Oriented to person,Oriented to place,Disoriented to time,Disoriented to situation  Cognition: Follows commands  Safety/Judgement: Awareness of environment  Functional Mobility Training:       Transfers:  Sit to Stand: Contact guard assistance                                Balance:  Sitting - Static: Good (unsupported)  Standing: With support  Standing - Static: Good  Ambulation/Gait Training:  Distance (ft): 40 Feet (ft)  Assistive Device: Gait belt  Ambulation - Level of Assistance: Assist x1;Minimal assistance        Gait Abnormalities: Decreased step clearance        Base of Support: Narrowed     Speed/Jessenia: Pace decreased (<100 feet/min)  Step Length: Left shortened;Right shortened                 Activity Tolerance:   Good    After treatment patient left in no apparent distress:   Sitting in chair    COMMUNICATION/COLLABORATION:   The patients plan of care was discussed with: Physical therapist.     Michaelle Moore PTA   Time Calculation: 23 mins

## 2022-02-11 NOTE — PROGRESS NOTES
11:30 AM Arrives via stretcher to radiology department for therapeutic thoracentesis left lung  by Dr. Bonnei Bland MD. Rhunette Mars and NPO status confirmed. Assessment and pre-procedural assessment completed. Allergies and medications reviewed. Consent reviewed for correctness. Procedure, sedation plan and medication education provided. Patient voices good understanding of all. 11:40 AM Consent has been obtained. 12:10 PM Procedural timeout completed and procedure started. Procedure sterile prep left posterior pleural area, access by Dr Lan Reed with 6 FR pleural drainage catheter, fluid return clear germán in color. 12:22 PM total of fluid removed 1,060 ml. Samples to lab by Alpa CAMPOVERDE. Patient tolerated well. Pleural drainage catheter removed and gauze tegaderm to site, no bleeding or hematoma. Patient did have some coughing and chest discomfort as expected toward end of fluid drainage, pain subsided within 5 minutes. VS stable and remains on room air. See Intraprocedural flowsheet for all VS and monitoring during procedure. 12:35 Patient return to 65 with transport tech in stable condition, no shortness of breath, chest expansion is symmetrical and denies any chest pain. Procedural call report attempted to [facility].

## 2022-02-11 NOTE — PROGRESS NOTES
Problem: Pressure Injury - Risk of  Goal: *Prevention of pressure injury  Description: Document Luisito Scale and appropriate interventions in the flowsheet. Outcome: Progressing Towards Goal  Note: Pressure Injury Interventions:  Sensory Interventions: Assess changes in LOC,Assess need for specialty bed,Avoid rigorous massage over bony prominences,Maintain/enhance activity level,Minimize linen layers,Monitor skin under medical devices,Turn and reposition approx. every two hours (pillows and wedges if needed)    Moisture Interventions: Absorbent underpads,Apply protective barrier, creams and emollients,Assess need for specialty bed,Maintain skin hydration (lotion/cream),Minimize layers,Moisture barrier    Activity Interventions: Assess need for specialty bed,Increase time out of bed,Pressure redistribution bed/mattress(bed type),PT/OT evaluation    Mobility Interventions: Assess need for specialty bed,PT/OT evaluation,Pressure redistribution bed/mattress (bed type),Turn and reposition approx. every two hours(pillow and wedges)    Nutrition Interventions: Document food/fluid/supplement intake,Offer support with meals,snacks and hydration    Friction and Shear Interventions: Apply protective barrier, creams and emollients,Minimize layers,Transferring/repositioning devices                Problem: Falls - Risk of  Goal: *Absence of Falls  Description: Document Kole Fall Risk and appropriate interventions in the flowsheet.   Outcome: Progressing Towards Goal  Note: Fall Risk Interventions:  Mobility Interventions: Bed/chair exit alarm,Communicate number of staff needed for ambulation/transfer,Strengthening exercises (ROM-active/passive),Utilize walker, cane, or other assistive device,Utilize gait belt for transfers/ambulation         Medication Interventions: Bed/chair exit alarm,Patient to call before getting OOB,Teach patient to arise slowly,Utilize gait belt for transfers/ambulation    Elimination Interventions: Bed/chair exit alarm,Call light in reach    History of Falls Interventions: Bed/chair exit alarm,Door open when patient unattended,Room close to nurse's station,Utilize gait belt for transfer/ambulation

## 2022-02-15 NOTE — PROGRESS NOTES
Care Transitions Follow Up Call    Challenges to be reviewed by the provider   Additional needs identified to be addressed with provider: yes  advance care planning- Does not have an ACP or HIPAA on EPIC    Results for Kayla Watt (MRN [id number]) as of 2/15/2022 12:14   2022 05:12 2022 06:02 2/10/2022 05:15   WBC 4.0 (L) 4.0 (L) 4.5   RBC 3.25 (L) 3.08 (L) 3.22 (L)   HGB 8.1 (L) 7.7 (L) 7.9 (L)          Method of communication with provider : none    Care Transition Nurse (CTN) contacted the family Wife Jo-Ann Mcknight by telephone to follow up after admission on 22. Verified name and  with family as identifiers. Addressed changes since last contact: Readmitted -22  Follow up appointment completed? yes. Was follow up appointment scheduled within 7 days of discharge? yes. Seen virtually by [facility] today    Advance Care Planning:   Does patient have an Advance Directive:  health care decision makers updated    CTN reviewed discharge instructions, medical action plan and red flags with family and discussed any barriers to care and/or understanding of plan of care after discharge. Discussed appropriate site of care based on symptoms and resources available to patient including: PCP and Specialist. The family agrees to contact the PCP office for questions related to their healthcare. Patients top risk factors for readmission: functional physical ability, falls and medical condition-A fib, cough, fever, sepsis, HF   Interventions to address risk factors: Scheduled appointment with PCP-Wife CXL appt with PCP on 22 \"patient too weak\", Scheduled appointment with Specialist-Patient seen by [facility] Dr Mimi Rodriguez  today virtually and Assessment and support for treatment adherence and medication management-Afib, parkinson's, HF    REHABILITATION [facility] follow up appointment(s): No future appointments.   Non-[facility] follow up appointment(s): Seen by Dr Mendy Chung Virtually today    CTN provided contact information for future needs. Plan for follow-up call in 7-10 days based on severity of symptoms and risk factors. Plan for next call: symptom management-cough,weakness, self management-Low sodium diet, daily weights and follow up appointment-Wife to reschedule PCP appt. Goals Addressed                 This Visit's Progress     Attends follow-up appointments as directed. On track     01/31/22    Patient wife is going to call and schedule an appt with PCP and Pulm today. Monitor status of these appt on  next call. Arpan ZHENG, RN, CCM / Care Transition Nurse / [phone]     02/15/22    Patient seen by [facility] virtually today.  Patient had an appt on 2/16 with PCP office and cancelled it because wife thought he was too weak to go. Encouraged to call and reschedule since he did walk some today. Monitor status of PCP appt on next call. Arpan ZHENG, RN, CCM / Care Transition Nurse / [phone]        Understands red flags post discharge. On track     01/31/22    Patient taking nap spoke with wife Claudia Short. Patient has dry cough, some SOB on exertions. Denies any pedal edema. Up with use of walker.  Has been seen by PT on 1/28, to be seen by OT this week.   Patient is on Tylenol, Elquis, Midorine and Mirtazapine only for medications.  Patient with chronic back pain from falls -takes Tylenol for this.  Patient has a scale does not weigh everyday, discussed why daily weights are important and what a 3 lb or 5 lb in 1 week weight gain means-call provider. Monitor daily weight, SOB, cough, falls, activity status and PT status on next call. Arpan ZHENG, RN, CCM / Care Transition Nurse / [phone]     02/15/22   Albaro Oconnell and spoke to patient wife Claudia Short today. Patient had a bad cough with clear mucus Sunday and Monday, better now. Does have some SOB on exertion. Denies any pedal edema.  Patient is up with walker, not much of appetite, does eat 1 meal and a Boost per day.      Has not weighed since he has been home  [facility] with with [facility] is to see patient this afternoon.   Monitor cough, SOB on exertion, pedal edema, PO intake, daily weight and activity tolerance on next pascale.    Salomon Felty MSN, RN, CCM / Care Transition Nurse / [phone]

## 2022-02-16 NOTE — PROGRESS NOTES
02/16/22   Care Transitions Outreach Attempt-LMTCB. Also suggested [facility] and gave # to call for [facility].     Kayla Garcia MSN, RN, CCM / Care Transition Nurse / [phone]

## 2022-02-18 NOTE — PROGRESS NOTES
Care Transitions Follow Up Call    Care Transition Nurse (CTN) contacted the family- wife Daniel Garibay by telephone to follow up. Verified name and  with patient as identifiers. Addressed changes since last contact: none  Follow up appointment completed? yes. Was follow up appointment scheduled within 7 days of discharge? yes. Seen by Keysha Or on 2/15/22 and has an appt with PCP 22      CTN reviewed discharge instructions, medical action plan and red flags with patient and discussed any barriers to care and/or understanding of plan of care after discharge. Discussed appropriate site of care based on symptoms and resources available to patient including: PCP and Specialist, Abimael Pettit. The family agrees to contact the PCP office for questions related to their healthcare. [address] follow up appointment(s): No future appointments. Non-[facility] follow up appointment(s): PCP office on 22 at 1:45pm    CTN provided contact information for future needs. Plan for follow-up call in 5-7 days based on severity of symptoms and risk factors. Goals Addressed                 This Visit's Progress     Attends follow-up appointments as directed. On track     22    Patient wife is going to call and schedule an appt with PCP and Pulm today. Monitor status of these appt on  next call. Vaibhav Lou MSN, RN, CCM / Care Transition Nurse / [phone]     02/15/22    Patient seen by Keysha Or virtually today.  Patient had an appt on  with PCP office and cancelled it because wife thought he was too weak to go. Encouraged to call and reschedule since he did walk some today. Monitor status of PCP appt on next call. Vaibhav Lou MSN, RN, CCM / Care Transition Nurse / [phone]     22    Per patient wife patient has an appt with PCP on  in office at 1:45 pm.  Wife is taking him there.    Also discussed [facility], wife is aware of them has used in past.  Monitor status of PCP appt on next call. Tatyana ZHENG, RN, CCM / Care Transition Nurse / [phone]        Understands red flags post discharge. On track     01/31/22    Patient taking nap spoke with wife Radha. Patient has dry cough, some SOB on exertions. Denies any pedal edema. Up with use of walker.  Has been seen by PT on 1/28, to be seen by OT this week.   Patient is on Tylenol, Elquis, Midorine and Mirtazapine only for medications.  Patient with chronic back pain from falls -takes Tylenol for this.  Patient has a scale does not weigh everyday, discussed why daily weights are important and what a 3 lb or 5 lb in 1 week weight gain means-call provider. Monitor daily weight, SOB, cough, falls, activity status and PT status on next call. Tatyana ZHENG, RN, CCM / Care Transition Nurse / [phone]     02/15/22   Adrianna Ashford and spoke to patient wife Radha today. Patient had a bad cough with clear mucus Sunday and Monday, better now. Does have some SOB on exertion. Denies any pedal edema.  Patient is up with walker, not much of appetite, does eat 1 meal and a Boost per day.  Has not weighed since he has been home  [facility] with with [facility] is to see patient this afternoon. Monitor cough, SOB on exertion, pedal edema, PO intake, daily weight and activity tolerance on next pascale.    Tatyana ZHENG, RN, CCM / Care Transition Nurse / [phone]     02/18/22   Adrianna Ashford and spoke to patient wife Radha, patient continues to have a cough-started his ABX ordered by Maribell Edwards yesterday. Does get SOB on exertion, up with walker or W/C. Denies any Pedal edema.  Continues with PT/OT seen yesterday and to be seen again on Tuesday.  Wife states he is eating \"moderately\", has not weighed because his legs are too \"not stable\" to stand on scale.  O2 Sat on R/A is 91%. Monitor for cough- finish ABX?, SOB, pedal edema, activity tolerance, PO intake, can he weigh?       Tatyana ZHENG, RN, CCM / Care Transition Nurse / [phone]

## 2022-02-22 NOTE — PROGRESS NOTES
Care Transitions Follow Up CallCare Transition Nurse (CTN) contacted the family by telephone to follow up. Verified name and  with family as identifiers. CTN reviewed discharge instructions, medical action plan and red flags with family and discussed any barriers to care and/or understanding of plan of care after discharge. Discussed appropriate site of care based on symptoms and resources available to patient including: PCP and Specialist. The family agrees to contact the PCP office for questions related to their healthcare. Patients top risk factors for readmission: functional physical ability, falls, medical condition-HF, CVA, Parkinson's, anemia, support system and caregiver stress   Interventions to address risk factors: Scheduled appointment with PCP-Seen by PCP on 22, Scheduled appointment with Specialist-Wife to call and make an appt with Pulm and Assessment and support for treatment adherence and medication management-, Parkinson's    REHABILITATION [facility] follow up appointment(s): No future appointments. Non-[facility] follow up appointment(s): Wife to call and make an appt with Pulm    CTN provided contact information for future needs. Plan for follow-up call in 7-10 days based on severity of symptoms and risk factors. Goals Addressed                 This Visit's Progress     Attends follow-up appointments as directed. On track     22    Patient wife is going to call and schedule an appt with PCP and Pulm today. Monitor status of these appt on  next call. Varghese ZHENG, RN, CCM / Care Transition Nurse / [phone]     02/15/22    Patient seen by Ruth hernandez today.  Patient had an appt on  with PCP office and cancelled it because wife thought he was too weak to go. Encouraged to call and reschedule since he did walk some today. Monitor status of PCP appt on next call.     Varghese ZHENG, RN, CCM / Care Transition Nurse / [phone]     22    Per patient wife patient has an appt with PCP on 2/21 in office at 1:45 pm.  Wife is taking him there.  Also discussed [facility], wife is aware of them has used in past.  Monitor status of PCP appt on next call. Yvonne ZHENG, RN, CCM / Care Transition Nurse / [phone]     02/22/22    Patient was seen in office by PCP yesterday and had labs drawn and received B12 injection. Did not get weight since he was in W/C.  PCP wants him to F/U with Pulm, wife to call and make an appt. Monitor status of Pulm and ? Cards appt on next call. Yvonne ZHENG, RN, CCM / Care Transition Nurse / [phone]        Understands red flags post discharge. On track     01/31/22    Patient taking nap spoke with wife Diane Serrato. Patient has dry cough, some SOB on exertions. Denies any pedal edema. Up with use of walker.  Has been seen by PT on 1/28, to be seen by OT this week.   Patient is on Tylenol, Elquis, Midorine and Mirtazapine only for medications.  Patient with chronic back pain from falls -takes Tylenol for this.  Patient has a scale does not weigh everyday, discussed why daily weights are important and what a 3 lb or 5 lb in 1 week weight gain means-call provider. Monitor daily weight, SOB, cough, falls, activity status and PT status on next call. Yvonne ZHENG, RN, CCM / Care Transition Nurse / [phone]     02/15/22   [facility] and spoke to patient wife Diane Serrato today. Patient had a bad cough with clear mucus Sunday and Monday, better now. Does have some SOB on exertion. Denies any pedal edema.  Patient is up with walker, not much of appetite, does eat 1 meal and a Boost per day.  Has not weighed since he has been home  [facility] with with [facility] is to see patient this afternoon.   Monitor cough, SOB on exertion, pedal edema, PO intake, daily weight and activity tolerance on next pascale.    Yvonne ZHENG, RN, CCM / Care Transition Nurse / [phone]     02/18/22    Called and spoke to patient wife Sheyla Valdez, patient continues to have a cough-started his ABX ordered by Lily Strickland yesterday. Does get SOB on exertion, up with walker or W/C. Denies any Pedal edema.  Continues with PT/OT seen yesterday and to be seen again on Tuesday.  Wife states he is eating \"moderately\", has not weighed because his legs are too \"not stable\" to stand on scale.  O2 Sat on R/A is 91%. Monitor for cough- finish ABX?, SOB, pedal edema, activity tolerance, PO intake, can he weigh? Wende Duane MSN, RN, CCM / Care Transition Nurse / [phone]     02/22/22   Be Keane and spoke to patient wife Sheyla Valdez, patient continues to have white foamy mucus.  Is eating 2 light meals a day and then Boost, Boost fills him up-suggested making a HS milk shake with boost.    Continue to be weak, wife is afraid to weigh him herself, OT is to see patient tomorrow and they are going to weigh with wife. Was seen by PT today.  Patient did finish his ABX. Monitor cough, SOB, did we weigh? PO intake and activity tolerance on next call.     Wende Duane MSN, RN, CCM / Care Transition Nurse / [phone]

## 2022-03-01 NOTE — PROGRESS NOTES
.  Care Transitions Follow Up Call    Care Transition Nurse (CTN) contacted the family by telephone to follow up. Verified name and  with family as identifiers. Addressed changes since last contact: none    CTN reviewed discharge instructions, medical action plan and red flags with family and discussed any barriers to care and/or understanding of plan of care after discharge. Discussed appropriate site of care based on symptoms and resources available to patient including: PCP, Specialist and Abimael Pettit. The family agrees to contact the PCP office for questions related to their healthcare. [facility] follow up appointment(s): No future appointments. Non-[facility] follow up appointment(s): Cards 3/22/22, Pulm 22    CTN provided contact information for future needs. Plan for follow-up call in 10-14 days based on severity of symptoms and risk factors. Goals Addressed                 This Visit's Progress     Attends follow-up appointments as directed. On track     22    Patient wife is going to call and schedule an appt with PCP and Pulm today. Monitor status of these appt on  next call. Harshal ZHENG, RN, CCM / Care Transition Nurse / [phone]     02/15/22    Patient seen by Genet hernandez today.  Patient had an appt on  with PCP office and cancelled it because wife thought he was too weak to go. Encouraged to call and reschedule since he did walk some today. Monitor status of PCP appt on next call. Harshal ZHENG, RN, CCM / Care Transition Nurse / [phone]     22    Per patient wife patient has an appt with PCP on  in office at 1:45 pm.  Wife is taking him there.  Also discussed [facility], wife is aware of them has used in past.  Monitor status of PCP appt on next call. Harshal ZHENG, RN, CCM / Care Transition Nurse / [phone]     22    Patient was seen in office by PCP yesterday and had labs drawn and received B12 injection. Did not get weight since he was in W/C.  PCP wants him to F/U with Pulm, wife to call and make an appt. Monitor status of Pulm and ? Cards appt on next call. Apolinar ZHENG, RN, CCM / Care Transition Nurse / [phone]     03/01/22    Patient has an appt with Cards on 3/22.  Patient has an appt with Pulm on 5/16 but wife wants to make that sooner. Monitor status of these appt on next call. Apolinar ZHENG, RN, CCM / Care Transition Nurse / [phone]        Understands red flags post discharge. On track     01/31/22    Patient taking nap spoke with wife Jonathan Rivera. Patient has dry cough, some SOB on exertions. Denies any pedal edema. Up with use of walker.  Has been seen by PT on 1/28, to be seen by OT this week.   Patient is on Tylenol, Elquis, Midorine and Mirtazapine only for medications.  Patient with chronic back pain from falls -takes Tylenol for this.  Patient has a scale does not weigh everyday, discussed why daily weights are important and what a 3 lb or 5 lb in 1 week weight gain means-call provider. Monitor daily weight, SOB, cough, falls, activity status and PT status on next call. Apolinar ZHENG, RN, CCM / Care Transition Nurse / [phone]     02/15/22   Marybel Ayala and spoke to patient wife Jonathan Rivera today. Patient had a bad cough with clear mucus Sunday and Monday, better now. Does have some SOB on exertion. Denies any pedal edema.  Patient is up with walker, not much of appetite, does eat 1 meal and a Boost per day.  Has not weighed since he has been home  [facility] with with [facility] is to see patient this afternoon. Monitor cough, SOB on exertion, pedal edema, PO intake, daily weight and activity tolerance on next pascale.    Apolinar ZHENG, RN, CCM / Care Transition Nurse / [phone]     02/18/22   Marybel Ayala and spoke to patient wife Jonathan Rivera, patient continues to have a cough-started his ABX ordered by [facility] yesterday.   Does get SOB on exertion, up with walker or W/C. Denies any Pedal edema.  Continues with PT/OT seen yesterday and to be seen again on Tuesday.  Wife states he is eating \"moderately\", has not weighed because his legs are too \"not stable\" to stand on scale.  O2 Sat on R/A is 91%. Monitor for cough- finish ABX?, SOB, pedal edema, activity tolerance, PO intake, can he weigh? Rush ZHENG, RN, CCM / Care Transition Nurse / [phone]     02/22/22   Susan Villagomez and spoke to patient wife Marlyn Castelan, patient continues to have white foamy mucus.  Is eating 2 light meals a day and then Boost, Boost fills him up-suggested making a HS milk shake with boost.    Continue to be weak, wife is afraid to weigh him herself, OT is to see patient tomorrow and they are going to weigh with wife. Was seen by PT today.  Patient did finish his ABX. Monitor cough, SOB, did we weigh? PO intake and activity tolerance on next call. Rush ZHENG, RN, CCM / Care Transition Nurse / [phone]     03/01/22   Nila Shirley with patient wife Marlyn Castelan, patient has finished ABX. Still hs a cough with foamy white mucus. Does get SOB and not always with exertion, can't figure out what makes him SOB at times. PO intake is better.  Did weigh yesterday and was 176 lbs. Seen by Major SHEPHERD and PT today, was able to walk a little bit.  Discussed support systems, wife has a neighbor and children who work or out of town, come on weekends. She is able to get out to go to grocery store close by.  Wants to look into Respite care, encouraged her to look into it now and that way when she needs it does not have to check things out. Received list of caretakers from Hospital.  Monitor SOB, cough, daily weight, activity tolerance and PO intake and did Mrs Siddhartha Martinez look into respite support yet?       Rush ZHENG, RN, CCM / Care Transition Nurse / [phone]

## 2022-03-15 NOTE — PROGRESS NOTES
Care Transitions Follow Up Call  Care Transition Nurse (CTN) contacted the family by telephone to follow up. Verified name and  with family as identifiers. Addressed changes since last contact: To recieve O2 ordered from Pulm, getting thoracentissi on 3/17/22    CTN reviewed discharge instructions, medical action plan and red flags with family and discussed any barriers to care and/or understanding of plan of care after discharge. Discussed appropriate site of care based on symptoms and resources available to patient including: PCP and Specialist. The family agrees to contact the PCP office for questions related to their healthcare. [facility] follow up appointment(s): No future appointments. Non-[facility] follow up appointment(s): Same day surgery at [facility] for Thoracentesis on 3/17/22. CTN provided contact information for future needs. Plan for follow-up call in 5-7 days based on severity of symptoms and risk factors. Goals Addressed                 This Visit's Progress     Attends follow-up appointments as directed. On track     22    Patient wife is going to call and schedule an appt with PCP and Pulm today. Monitor status of these appt on  next call. Scooter ZHENG, RN, CCM / Care Transition Nurse / [phone]     02/15/22    Patient seen by Lei hernandez today.  Patient had an appt on  with PCP office and cancelled it because wife thought he was too weak to go. Encouraged to call and reschedule since he did walk some today. Monitor status of PCP appt on next call. Scooter ZHENG, RN, ARIANNE / Care Transition Nurse / [phone]     22    Per patient wife patient has an appt with PCP on  in office at 1:45 pm.  Wife is taking him there.  Also discussed [facility], wife is aware of them has used in past.  Monitor status of PCP appt on next call.     Scooter ZHENG, RN, CCM / Care Transition Nurse / [phone]     22    Patient was seen in office by PCP yesterday and had labs drawn and received B12 injection. Did not get weight since he was in W/C.  PCP wants him to F/U with Pulm, wife to call and make an appt. Monitor status of Pulm and ? Cards appt on next call. Vaibhav Lou MSN, RN, CCM / Care Transition Nurse / [phone]     03/01/22    Patient has an appt with Cards on 3/22.  Patient has an appt with Pulm on 5/16 but wife wants to make that sooner. Monitor status of these appt on next call. Vaibhav ZHENG, RN, CCM / Care Transition Nurse / [phone]     03/15/22    Patient was seen by Keysha Garcia- Dr Rafael Auguste on 3/14/22 and O2 has been ordered, hopping to get tomorrow.  Per wife patient is to go to [facility] on 3/17/22 and get a \"tube in his lung to drain\"  They are leaving the \"tube\" in and then nurse will come weekly to drain the \"tube\". [facility] appt not in EMR so must have been told to me by Mrs Jordy Bass. Monitor  status of these appt on next call.  Understands red flags post discharge. Not on track     01/31/22    Patient taking nap spoke with wife Evita Newton. Patient has dry cough, some SOB on exertions. Denies any pedal edema. Up with use of walker.  Has been seen by PT on 1/28, to be seen by OT this week.   Patient is on Tylenol, Elquis, Midorine and Mirtazapine only for medications.  Patient with chronic back pain from falls -takes Tylenol for this.  Patient has a scale does not weigh everyday, discussed why daily weights are important and what a 3 lb or 5 lb in 1 week weight gain means-call provider. Monitor daily weight, SOB, cough, falls, activity status and PT status on next call. Vaibhav ZHENG, RN, CCM / Care Transition Nurse / [phone]     02/15/22   Simeon Orosco and spoke to patient wife Evita Newton today. Patient had a bad cough with clear mucus Sunday and Monday, better now. Does have some SOB on exertion. Denies any pedal edema.    Patient is up with walker, not much of appetite, does eat 1 meal and a Boost per day.  Has not weighed since he has been home  [facility] with with [facility] is to see patient this afternoon. Monitor cough, SOB on exertion, pedal edema, PO intake, daily weight and activity tolerance on next pascale.    Thanh ZHENG, RN, CCM / Care Transition Nurse / [phone]     02/18/22   Monique Owens and spoke to patient wife Christelle Wilson, patient continues to have a cough-started his ABX ordered by [facility] yesterday. Does get SOB on exertion, up with walker or W/C. Denies any Pedal edema.  Continues with PT/OT seen yesterday and to be seen again on Tuesday.  Wife states he is eating \"moderately\", has not weighed because his legs are too \"not stable\" to stand on scale.  O2 Sat on R/A is 91%. Monitor for cough- finish ABX?, SOB, pedal edema, activity tolerance, PO intake, can he weigh? Thanh ZHENG, RN, CCM / Care Transition Nurse / [phone]     02/22/22   Moniquelima Owens and spoke to patient wife Christelle Wilson, patient continues to have white foamy mucus.  Is eating 2 light meals a day and then Boost, Boost fills him up-suggested making a HS milk shake with boost.    Continue to be weak, wife is afraid to weigh him herself, OT is to see patient tomorrow and they are going to weigh with wife. Was seen by PT today.  Patient did finish his ABX. Monitor cough, SOB, did we weigh? PO intake and activity tolerance on next call. Thanh ZHENG, RN, CCM / Care Transition Nurse / [phone]     03/01/22   María Elena Roberts with patient wife Christelle Wilson, patient has finished ABX. Still hs a cough with foamy white mucus. Does get SOB and not always with exertion, can't figure out what makes him SOB at times. PO intake is better.  Did weigh yesterday and was 176 lbs. Seen by Major SN and PT today, was able to walk a little bit.  Discussed support systems, wife has a neighbor and children who work or out of town, come on weekends.   She is able to get out to go to grocery store close by.  Wants to look into Respite care, encouraged her to look into it now and that way when she needs it does not have to check things out. Received list of caretakers from Hospital.  Monitor SOB, cough, daily weight, activity tolerance and PO intake and did Mrs Matheus Shepard look into respite support yet? Wende Duane MSN, RN, CCM / Care Transition Nurse / [phone]      03/15/22   eB Keane and spoke to Mrs Matheus Shepard, she stats patient is not doing well today. Continues to have a productive cough with foamy white mucus. His )2 was dropping when he stands making him dizzy, has not stood up for 4 days, moves around by W/C. To be receiving O2 hopefully 3/6/22.  Is to be seen by [facility] today.  Not eating much, did get him to drink 1 Boost milkshake today.  Has not looked into Respite care yet for herself, \"too much going on\". Monitor cough, O2 sat, did he get O2, activity status, PO intake, can he stand to weigh,  resite status on next call.   Wende Duane MSN, RN, CCM / Care Transition Nurse / [phone]

## 2022-03-21 NOTE — PROGRESS NOTES
Care Transitions Follow Up Call    Challenges to be reviewed by the provider   Additional needs identified to be addressed with provider:  Had Thorocentesis on 3/17 and had 500 cc removed now has a Aspira Cath. Is constipated had senna and miralax today       Method of communication with provider : none    Care Transition Nurse (CTN) contacted the family by telephone to follow up after. Verified name and  with family as identifiers. CTN reviewed discharge instructions, medical action plan and red flags with family and discussed any barriers to care and/or understanding of plan of care after discharge. Discussed appropriate site of care based on symptoms and resources available to patient including: PCP and Specialist. The family agrees to contact the PCP office for questions related to their healthcare. [address] follow up appointment(s): No future appointments. Non-[facility] follow up appointment(s): Cardiology 3/22/22 per patient wife    CTN provided contact information for future needs. Plan for follow-up call in 5-7 days based on severity of symptoms and risk factors. Goals Addressed                 This Visit's Progress     Attends follow-up appointments as directed. On track     22    Patient wife is going to call and schedule an appt with PCP and Pulm today. Monitor status of these appt on  next call. Rosi ZHENG, RN, CCM / Care Transition Nurse / [phone]     02/15/22    Patient seen by Javy hernandez today.  Patient had an appt on  with PCP office and cancelled it because wife thought he was too weak to go. Encouraged to call and reschedule since he did walk some today. Monitor status of PCP appt on next call. Rosi ZHENG, RN, CCM / Care Transition Nurse / [phone]     22    Per patient wife patient has an appt with PCP on  in office at 1:45 pm.  Wife is taking him there.    Also discussed [facility], wife is aware of them has used in past.  Monitor status of PCP appt on next call. Scott ZHENG, RN, CCM / Care Transition Nurse / [phone]     02/22/22    Patient was seen in office by PCP yesterday and had labs drawn and received B12 injection. Did not get weight since he was in W/C.  PCP wants him to F/U with Pulm, wife to call and make an appt. Monitor status of Pulm and ? Cards appt on next call. Scott ZHENG, RN, CCM / Care Transition Nurse / [phone]     03/01/22    Patient has an appt with Cards on 3/22.  Patient has an appt with Pulm on 5/16 but wife wants to make that sooner. Monitor status of these appt on next call. Scott ZHENG, RN, [facility] / Care Transition Nurse / [phone]     03/15/22    Patient was seen by Layla Mendez- Dr Marcelo Hairston on 3/14/22 and O2 has been ordered, hopping to get tomorrow.  Per wife patient is to go to [facility] on 3/17/22 and get a \"tube in his lung to drain\"  They are leaving the \"tube\" in and then nurse will come weekly to drain the \"tube\". [facility] appt not in EMR so must have been told to me by Mrs Marleen Carmona. Monitor  status of these appt on next call.    03/21/22    Patient has an appt with Cards tomorrow per Mrs Marleen Carmona. Monitor status of this appt on next call. Scott ZHENG, RN, [facility] / Care Transition Nurse / [phone]          Understands red flags post discharge. On track     01/31/22    Patient taking nap spoke with wife Marisa Robins. Patient has dry cough, some SOB on exertions. Denies any pedal edema. Up with use of walker.  Has been seen by PT on 1/28, to be seen by OT this week.   Patient is on Tylenol, Elquis, Midorine and Mirtazapine only for medications.  Patient with chronic back pain from falls -takes Tylenol for this.  Patient has a scale does not weigh everyday, discussed why daily weights are important and what a 3 lb or 5 lb in 1 week weight gain means-call provider.   Monitor daily weight, SOB, cough, falls, activity status and PT status on next call. Thanh ZHENG, RN, CCM / Care Transition Nurse / [phone]     02/15/22   Moniquelima Owens and spoke to patient wife Christelle Wilson today. Patient had a bad cough with clear mucus Sunday and Monday, better now. Does have some SOB on exertion. Denies any pedal edema.  Patient is up with walker, not much of appetite, does eat 1 meal and a Boost per day.  Has not weighed since he has been home  [facility] with with [facility] is to see patient this afternoon. Monitor cough, SOB on exertion, pedal edema, PO intake, daily weight and activity tolerance on next pascale.    Thanh ZHENG, RN, [facility] / Care Transition Nurse / [phone]     02/18/22   Monique Budrichardson and spoke to patient wife Christelle Wilson, patient continues to have a cough-started his ABX ordered by Varun Skinner yesterday. Does get SOB on exertion, up with walker or W/C. Denies any Pedal edema.  Continues with PT/OT seen yesterday and to be seen again on Tuesday.  Wife states he is eating \"moderately\", has not weighed because his legs are too \"not stable\" to stand on scale.  O2 Sat on R/A is 91%. Monitor for cough- finish ABX?, SOB, pedal edema, activity tolerance, PO intake, can he weigh? Thanh ZHENG, RN, [facility] / Care Transition Nurse / [phone]     02/22/22   [facility] to patient wife Christelle Wilson, patient continues to have white foamy mucus.  Is eating 2 light meals a day and then Boost, Boost fills him up-suggested making a HS milk shake with boost.    Continue to be weak, wife is afraid to weigh him herself, OT is to see patient tomorrow and they are going to weigh with wife. Was seen by PT today.  Patient did finish his ABX. Monitor cough, SOB, did we weigh? PO intake and activity tolerance on next call. Thanh ZHENG, RN, [facility] / Care Transition Nurse / [phone]     03/01/22   María Elena Roberts with patient wife Christelle Wilson, patient has finished ABX. Still hs a cough with foamy white mucus.   Does get SOB and not always with exertion, can't figure out what makes him SOB at times. PO intake is better.  Did weigh yesterday and was 176 lbs. Seen by Major SHEPHERD and PT today, was able to walk a little bit.  Discussed support systems, wife has a neighbor and children who work or out of town, come on weekends. She is able to get out to go to grocery store close by.  Wants to look into Respite care, encouraged her to look into it now and that way when she needs it does not have to check things out. Received list of caretakers from Hospital.  Monitor SOB, cough, daily weight, activity tolerance and PO intake and did Mrs Tatyana Hilliard look into respite support yet? Evon Whitney MSN, RN, CCM / Care Transition Nurse / [phone]      03/15/22   Akira Biswas and spoke to Mrs Tatyana Hilliard, she stats patient is not doing well today. Continues to have a productive cough with foamy white mucus. His )2 was dropping when he stands making him dizzy, has not stood up for 4 days, moves around by W/C. To be receiving O2 hopefully 3/6/22.  Is to be seen by [facility] today.  Not eating much, did get him to drink 1 Boost milkshake today.  Has not looked into Respite care yet for herself, \"too much going on\". Monitor cough, O2 sat, did he get O2, activity status, PO intake, can he stand to weigh,  resite status on next call. Evon ZHENG, RN, CCM / Care Transition Nurse / [phone]     03/21/22    Patient did have his thoracentesis on 3/18/22 and had 500 cc removed. Still has an [address] cath in and [facility] nurse will come Thursday to empty.  Patient did not weigh today felt kind of dizzy and is having constipation. Has taken Senna and Miralax today without success yet. Remains on O2.  Wife wants to look into respite care if patient does not get stronger. Monitor daily weight, O2, po intake, constipation, activity tolerance and Aspria output on next call.     Evon Whitney MSN, RN, CCM / Care Transition Nurse / [phone]

## 2022-03-24 NOTE — PROGRESS NOTES
Patient has graduated from the Bundle program on 3/24/22. Patient/family has the ability to self-manage at this time Care management goals have been completed. Patient was not referred to the [facility] team for further management. Goals Addressed                 This Visit's Progress     COMPLETED: Attends follow-up appointments as directed. 01/31/22    Patient wife is going to call and schedule an appt with PCP and Pulm today. Monitor status of these appt on  next call. Honey ZHENG, RN, CCM / Care Transition Nurse / [phone]     02/15/22    Patient seen by Corinna hernandez today.  Patient had an appt on 2/16 with PCP office and cancelled it because wife thought he was too weak to go. Encouraged to call and reschedule since he did walk some today. Monitor status of PCP appt on next call. Honey ZHENG, RN, CCM / Care Transition Nurse / [phone]     02/18/22    Per patient wife patient has an appt with PCP on 2/21 in office at 1:45 pm.  Wife is taking him there.  Also discussed [facility], wife is aware of them has used in past.  Monitor status of PCP appt on next call. Honey ZHENG, RN, CCM / Care Transition Nurse / [phone]     02/22/22    Patient was seen in office by PCP yesterday and had labs drawn and received B12 injection. Did not get weight since he was in W/C.  PCP wants him to F/U with Pulm, wife to call and make an appt. Monitor status of Pulm and ? Cards appt on next call. Honey ZHENG, RN, CCM / Care Transition Nurse / [phone]     03/01/22    Patient has an appt with Cards on 3/22.  Patient has an appt with Pulm on 5/16 but wife wants to make that sooner. Monitor status of these appt on next call. Honey ZHENG, RN, CCM / Care Transition Nurse / [phone]     03/15/22    Patient was seen by Corinna Kenney- Dr Mimi Rodriguez on 3/14/22 and O2 has been ordered, hopping to get tomorrow.    Per wife patient is to go to [facility] on 3/[facility]/22 and get a \"tube in his lung to drain\"  They are leaving the \"tube\" in and then nurse will come weekly to drain the \"tube\". [facility] appt not in EMR so must have been told to me by Mrs Bradly Sanches. Monitor  status of these appt on next call.    03/21/22    Patient has an appt with Cards tomorrow per Mrs Bradly Sacnhes. Monitor status of this appt on next call. Harshal ZHENG, RN, CCM / Care Transition Nurse / [phone]     03/24/22    Patient was seen by Cards on 3/21 and has next appt 4/24/22 with Cards. Harshal ZHENG, RN, CCM / Care Transition Nurse / [phone]          COMPLETED: Understands red flags post discharge. 01/31/22    Patient taking nap spoke with wife Mariaelena Andrade. Patient has dry cough, some SOB on exertions. Denies any pedal edema. Up with use of walker.  Has been seen by PT on 1/28, to be seen by OT this week.   Patient is on Tylenol, Elquis, Midorine and Mirtazapine only for medications.  Patient with chronic back pain from falls -takes Tylenol for this.  Patient has a scale does not weigh everyday, discussed why daily weights are important and what a 3 lb or 5 lb in 1 week weight gain means-call provider. Monitor daily weight, SOB, cough, falls, activity status and PT status on next call. Harshal ZHENG, RN, CCM / Care Transition Nurse / [phone]     02/15/22   David Vergaralay and spoke to patient wife Mariaelena Andrade today. Patient had a bad cough with clear mucus Sunday and Monday, better now. Does have some SOB on exertion. Denies any pedal edema.  Patient is up with walker, not much of appetite, does eat 1 meal and a Boost per day.  Has not weighed since he has been home  [facility] with with [facility] is to see patient this afternoon.   Monitor cough, SOB on exertion, pedal edema, PO intake, daily weight and activity tolerance on next pascale.    Harshal ZHENG, RN, CCM / Care Transition Nurse / [phone]     02/18/22   David Carrizales and spoke to patient wife Mariaelena Andrade, patient continues to have a cough-started his ABX ordered by Lily Strickland yesterday. Does get SOB on exertion, up with walker or W/C. Denies any Pedal edema.  Continues with PT/OT seen yesterday and to be seen again on Tuesday.  Wife states he is eating \"moderately\", has not weighed because his legs are too \"not stable\" to stand on scale.  O2 Sat on R/A is 91%. Monitor for cough- finish ABX?, SOB, pedal edema, activity tolerance, PO intake, can he weigh? Wende Duane MSN, RN, CCM / Care Transition Nurse / [phone]     02/22/22   Be Keane and spoke to patient wife Sheyla Valdez, patient continues to have white foamy mucus.  Is eating 2 light meals a day and then Boost, Boost fills him up-suggested making a HS milk shake with boost.    Continue to be weak, wife is afraid to weigh him herself, OT is to see patient tomorrow and they are going to weigh with wife. Was seen by PT today.  Patient did finish his ABX. Monitor cough, SOB, did we weigh? PO intake and activity tolerance on next call. Wende Duane MSN, RN, CCM / Care Transition Nurse / [phone]     03/01/22   Ed Yanez with patient wife Sheyla Valdez, patient has finished ABX. Still hs a cough with foamy white mucus. Does get SOB and not always with exertion, can't figure out what makes him SOB at times. PO intake is better.  Did weigh yesterday and was 176 lbs. Seen by Major SHEPHERD and PT today, was able to walk a little bit.  Discussed support systems, wife has a neighbor and children who work or out of town, come on weekends. She is able to get out to go to grocery store close by.  Wants to look into Respite care, encouraged her to look into it now and that way when she needs it does not have to check things out. Received list of caretakers from Hospital.  Monitor SOB, cough, daily weight, activity tolerance and PO intake and did Mrs Matheus Shepard look into respite support yet?       Wende Duane MSN, RN, CCM / Care Transition Nurse / [phone]      03/15/22    Called and spoke to Mrs Jordy Bass, she stats patient is not doing well today. Continues to have a productive cough with foamy white mucus. His )2 was dropping when he stands making him dizzy, has not stood up for 4 days, moves around by W/C. To be receiving O2 hopefully 3/6/22.  Is to be seen by [facility] today.  Not eating much, did get him to drink 1 Boost milkshake today.  Has not looked into Respite care yet for herself, \"too much going on\". Monitor cough, O2 sat, did he get O2, activity status, PO intake, can he stand to weigh,  resite status on next call. Vaibhav ZHENG, RN, CCM / Care Transition Nurse / [phone]     03/21/22    Patient did have his thoracentesis on 3/18/22 and had 500 cc removed. Still has an [address] [facility] to [facility].  Patient did not weigh today felt kind of dizzy and is having constipation. Has taken Senna and Miralax today without success yet. Remains on O2.  Wife wants to look into respite care if patient does not get stronger. Monitor daily weight, O2, po intake, constipation, activity tolerance and Aspria output on next call. Vaibhav ZHENG, RN, CCM / Care Transition Nurse / [phone]     03/24/22   Simeon Orosco and spoke to patient wife Evita Newton, patient is doing \"better\". Had his Aspira tube drained yesterday had \"2/3 liter\" output per wife. Last weighed at Cards appt 167 lbs. Is up with use of walker, has his O2. Continues to have cough with white foamy mucus. Has slight Pedal edema.  Continues to have constipation, had BM Tuesday, wife planning on giving senna today, encouraged to take daily. Continues to have a poor appetite, drinking Boost.     Wife has a list of 4 agencies that she can call for Respite care, needs to pick 1 and call.   Vaibhav ZHENG, RN, CCM / Care Transition Nurse / [phone]               Patient has Care Transition Nurse's contact information for any further questions, concerns, or needs. Patients upcoming visits:  No future appointments.

## 2022-04-04 PROBLEM — J90 PLEURAL EFFUSION, BILATERAL: Status: ACTIVE | Noted: 2022-01-01

## 2022-04-04 PROBLEM — R56.9 SEIZURE (HCC): Status: ACTIVE | Noted: 2022-01-01

## 2022-04-04 PROBLEM — G93.40 ACUTE ENCEPHALOPATHY: Status: ACTIVE | Noted: 2022-01-01

## 2022-04-04 PROBLEM — R41.89 UNRESPONSIVENESS: Status: ACTIVE | Noted: 2022-01-01

## 2022-04-04 NOTE — ED NOTES
Bedside and Verbal shift change report given to Tori (oncoming nurse) by Gal Burton (offgoing nurse). Report included the following information SBAR, ED Summary and MAR.

## 2022-04-04 NOTE — H&P
[facility]  [address], [place] [address]  [phone]    Admission History and Physical      NAME:  Jose F Rey   :   1935   MRN:  [id number]     PCP:  She [address] MD     Date of service:  2022         Subjective:     CHIEF COMPLAINT: Unresponsiveness    HISTORY OF PRESENT ILLNESS:     Mr. Sergei Sidhu is a 81 y.o.  male who is admitted with unresponsiveness. Mr. Sergei Sidhu with past medical history of chronic A. fib, diastolic CHF, CVA, orthostatic hypotension, Parkinson's disease, dementia was brought to ER due to unresponsiveness. Per his wife, when she tried to help him earlier this morning to get up to go to the bathroom, patient became unresponsive and started the whole body to shake. He was semiconscious initially, eyes open, but later he became unresponsive. Slowly, patient came out of it and remained lethargic. Here in ER, per his wife, patient is more awake, but still not at baseline. Past Medical History:   Diagnosis Date    Anemia     Atrial fibrillation ([address]     CHF (congestive heart failure), NYHA class I, chronic, diastolic (HCC)     has had to have lungs drained.  CKD (chronic kidney disease), stage III ([address]     Hx of completed stroke     right sided affected    Hypoalbuminemia     Orthostatic hypotension     Parkinson disease (HCC)     Proteinuria         Past Surgical History:   Procedure Laterality Date    HX CHOLECYSTECTOMY      HX KNEE ARTHROSCOPY Left        Social History     Tobacco Use    Smoking status: Not on file    Smokeless tobacco: Not on file   Substance Use Topics    Alcohol use: Not on file        No family history on file. No Known Allergies     Prior to Admission medications    Medication Sig Start Date End Date Taking? Authorizing Provider   digoxin (LANOXIN) 0.25 mg tablet Take 0.25 mg by mouth daily.    Yes Other, MD Parish   acetaminophen (Tylenol Extra Strength) 500 mg tablet Take 1,000 mg by mouth two (2) times a day. Yes Provider, Cheryle   apixaban (Eliquis) 5 mg tablet Take 5 mg by mouth two (2) times a day. Yes Cedric, MD Parish   midodrine (PROAMATINE) 2.5 mg tablet Take 2.5 mg by mouth three (3) times daily. Yes Cedric, MD Parish   carbidopa-levodopa (SINEMET)  mg per tablet Take 1.5 Tablets by mouth four (4) times daily.    Yes Other, MD Parish         Review of Systems:  (bold if positive, if negative)    Patient unable to provide history          Objective:      VITALS:    Vital signs reviewed; most recent are:    Visit Vitals  BP (!) 168/71   Pulse 68   Temp 98.3 °F (36.8 °C)   Resp 16   Ht 5' 11\" (1.803 m)   Wt 68.5 kg (151 lb 0.2 oz)   SpO2 100%   BMI 21.06 kg/m²     SpO2 Readings from Last 6 Encounters:   04/04/22 100%   02/11/22 95%   12/24/21 99%    O2 Flow Rate (L/min): 3 l/min   No intake or output data in the 24 hours ending 04/04/22 0542         Exam:     Physical Exam:    Gen:  Well-developed, well-nourished, in no acute distress  HEENT:  Pink conjunctivae, PERRL, hearing intact to voice, moist mucous membranes  Neck:  Supple, without masses, thyroid non-tender  Resp:  No accessory muscle use, clear breath sounds without wheezes rales or rhonchi  Card:  No murmurs, normal S1, S2 without thrills, bruits or peripheral edema  Abd:  Soft, non-tender, non-distended, normoactive bowel sounds are present, no palpable organomegaly and no detectable hernias  Lymph:  No cervical or inguinal adenopathy  Musc:  No cyanosis or clubbing  Skin:  No rashes or ulcers, skin turgor is good  Neuro:  Cranial nerves are grossly intact, no focal motor weakness,    Psych: Poor insight        Labs:    Recent Labs     04/04/22  0331   WBC 7.9   HGB 10.1*   HCT 35.3*        Recent Labs     04/04/22  0331      K 4.1   CL 94*   CO2 42*   *   BUN 25*   CREA 1.15   CA 9.3   ALB 2.7*   TBILI 0.9   ALT <6*     No results found for: GLUCPOC  No results for input(s): PH, PCO2, PO2, HCO3, FIO2 in the last 72 hours. No results for input(s): INR, INREXT in the last 72 hours. Telemetry reviewed:          Assessment/Plan:    1. Unresponsiveness (4/4/2022)/possible Seizure vs syncope due to orthostatic hypotension. Admit to telemetry. Seizure precaution. Check MRI of the brain, EEG and consult neurology     2. Chronic atrial fibrillation. Rate is well controlled. Recent ECHO with EF 60%. Continue Eliquis          3.  Parkinson disease ([facility] Continue Sinemet    4. CHF (congestive heart failure), NYHA class I, chronic, diastolic (HCC)/Pleural effusion, bilateral (4/4/2022). Compensated. Not usually on any diuretic in setting of orthostatic hypotension. Has pleural fluid drainage on the right     5. Dementia/Hx of completed stroke. Supportive care      6. Orthostatic hypotension. Continue midodrine    7.  Anemia - likely due to chronic disease. Recent serologies unremarkable.     CODE STATUS: DNR       Previous medical records reviewed     Risk of deterioration: high      Total time spent with patient: [address] [facility] discussed with: Patient, Family, Nursing Staff and >50% of time spent in counseling and coordination of care    Discussed:  Care Plan    Prophylaxis:  Eliquis    Probable Disposition:  Home w/Family           ___________________________________________________    Attending Physician: Chaka Thorpe MD

## 2022-04-04 NOTE — PROGRESS NOTES
Spiritual Care Assessment/Progress Note  [address]      NAME: Sherri Multani      MRN: [id number]  AGE: 81 y.o. SEX: male  Gnosticist Affiliation: No Restorationism   Language: English     4/4/2022     Total Time (in minutes): 17     Spiritual Assessment begun in [facility] EMERGENCY DEPT through conversation with:         [x]Patient        [x] Family    [] Friend(s)        Reason for Consult: Palliative Care, Initial/Spiritual Assessment     Spiritual beliefs: (Please include comment if needed)     [x] Identifies with a hank tradition:    Sikhism      [] Supported by a hank community:       None      [] Claims no spiritual orientation:           [] Seeking spiritual identity:                [] Adheres to an individual form of spirituality:           [] Not able to assess:                           Identified resources for coping:      [x] Prayer                               [] Music                  [] Guided Imagery     [x] Family/friends                 [] Pet visits     [] Devotional reading                         [] Unknown     [] Other:                                              Interventions offered during this visit: (See comments for more details)    Patient Interventions: Affirmation of emotions/emotional suffering,Normalization of emotional/spiritual concerns,Prayer (assurance of)     Family/Friend(s):  Affirmation of emotions/emotional suffering,Affirmation of hank,Catharsis/review of pertinent events in supportive environment,Initial Assessment,Normalization of emotional/spiritual concerns,Prayer (assurance of),Gnosticist beliefs/image of God discussed     Plan of Care:     [] Support spiritual and/or cultural needs    [] Support AMD and/or advance care planning process      [] Support grieving process   [] Coordinate Rites and/or Rituals    [] Coordination with community clergy   [] No spiritual needs identified at this time   [] Detailed Plan of Care below (See Comments)  [] Make referral to Music Therapy  [] Make referral to Pet Therapy     [] Make referral to Addiction services  [] Make referral to [facility]  [] Make referral to Spiritual Care Partner  [] No future visits requested        [x] Contact Spiritual Care for further referrals     Comments:  Spiritual care assessment with Mr. Mart Guevara in Emergency Department (ED). Mr Jerri Joy is a Palliative Pt and his wife, Rocio Sarah is next to him. Rocio Sarah expressed he had a stroke some time ago and the last several months have bee the most difficult. Mr Jerri Joy is awake and tried to communicate with  but not able to formulate words clear enough at this time. Rocio Sarah shared that they have 2 grown children, 4 grandchildren. He was raised Church not not active in any hank community. Rocio Sarah attended with the children, [employer]. They are anxiously, but with calm demeanor,  waiting test results. She expressed appreciation for the care and support and continued availability.    SANGITA Camarillo.Tirstan.  Lila Bhatti ([id number])

## 2022-04-04 NOTE — ED PROVIDER NOTES
81-year-old male with a history of chronic kidney disease, CHF, Parkinson's disease, atrial fibrillation presents after unresponsive episode today. He had gotten his wife up around 2 AM to use the bathroom but then became unresponsive where he was just staring. She states that he may not have been breathing. It lasted about a minute. He has had a few other episodes similar this past weekend. Patient denies any current complaints. He is back to his mental status baseline per his wife. History is limited secondary to patient's speech, age, likely dementia. On chart review patient was admitted from February 6 2 February 11 for atrial fibrillation with RVR. He also had acute respiratory failure with hypoxia due to bilateral pleural effusions/CHF. His left side pleural fluid drainage on February 11. Altered mental status          Past Medical History:   Diagnosis Date    Anemia     Atrial fibrillation (HCC)     CHF (congestive heart failure), NYHA class I, chronic, diastolic (HCC)     has had to have lungs drained.  CKD (chronic kidney disease), stage III ([facility]     Hx of completed stroke     right sided affected    Hypoalbuminemia     Orthostatic hypotension     Parkinson disease (HCC)     Proteinuria        Past Surgical History:   Procedure Laterality Date    HX CHOLECYSTECTOMY      HX KNEE ARTHROSCOPY Left          No family history on file.     Social History     Socioeconomic History    Marital status:      Spouse name: Not on file    Number of children: Not on file    Years of education: Not on file    Highest education level: Not on file   Occupational History    Not on file   Tobacco Use    Smoking status: Not on file    Smokeless tobacco: Not on file   Substance and Sexual Activity    Alcohol use: Not on file    Drug use: Not on file    Sexual activity: Not on file   Other Topics Concern    Not on file   Social History Narrative    Not on file     Social Determinants of Health     Financial Resource Strain:     Difficulty of Paying Living Expenses: Not on file   Food Insecurity:     Worried About Running Out of Food in the Last Year: Not on file    Magui of Food in the Last Year: Not on file   Transportation Needs:     Lack of Transportation (Medical): Not on file    Lack of Transportation (Non-Medical): Not on file   Physical Activity:     Days of Exercise per Week: Not on file    Minutes of Exercise per Session: Not on file   Stress:     Feeling of Stress : Not on file   Social Connections:     Frequency of Communication with Friends and Family: Not on file    Frequency of Social Gatherings with Friends and Family: Not on file    Attends Yarsani Services: Not on file    Active Member of [address] [place] [address] [employer] or Organizations: Not on file    Attends Club or Organization Meetings: Not on file    Marital Status: Not on file   Intimate Partner Violence:     Fear of Current or Ex-Partner: Not on file    Emotionally Abused: Not on file    Physically Abused: Not on file    Sexually Abused: Not on file   Housing Stability:     Unable to Pay for Housing in the Last Year: Not on file    Number of [place] in the Last Year: Not on file    Unstable Housing in the Last Year: Not on file         ALLERGIES: Patient has no known allergies. Review of Systems   Unable to perform ROS: Dementia       Vitals:    04/04/22 0313 04/04/22 0323   BP: (!) 146/53    Pulse: 79    Resp: 16    Temp: 98.3 °F (36.8 °C)    SpO2: 100% 100%   Weight: 68.5 kg (151 lb 0.2 oz)    Height: 5' 11\" (1.803 m)             Physical Exam  Vitals and nursing note reviewed. Constitutional:       General: He is not in acute distress. HENT:      Head: Normocephalic and atraumatic. Eyes:      General: No scleral icterus. Conjunctiva/sclera: Conjunctivae normal.      Pupils: Pupils are equal, round, and reactive to light. Neck:      Trachea: No tracheal deviation.    Cardiovascular: Rate and Rhythm: Normal rate and regular rhythm. Pulmonary:      Effort: Pulmonary effort is normal. No respiratory distress. Breath sounds: Normal breath sounds. No stridor. Abdominal:      General: There is no distension. Palpations: Abdomen is soft. Tenderness: There is no abdominal tenderness. Genitourinary:     Comments: deferred  Musculoskeletal:         General: No deformity. Cervical back: No rigidity. Skin:     General: Skin is warm and dry. Neurological:      General: No focal deficit present. Mental Status: He is alert. Mental status is at baseline. He is confused. Psychiatric:         Mood and Affect: Mood normal.         Behavior: Behavior normal.          [facility]  ED Course as of 04/04/22 0518   Mon Apr 04, 2022   0348 EKG shows atrial fibrillation at rate of 64, inverted T waves in the inferior leads, Compared to February 6, 2022 rate has decreased and T wave abnormalities are new in the inferior leads [TT]   0517 CO2(!!): 42  Likely compensation secondary to respiratory acidosis during episode of unresponsiveness [TT]      ED Course User Index  [TT] Edgar Recinos MD       Procedures          Perfect Serve Consult for Admission  5:21 AM    ED Room Number: ER10/10  Patient Name and age:  Kieran Chowdhury 81 y.o.  male  Working Diagnosis:   1. Unresponsive episode    2. Elevated troponin    3. Abnormal EKG        COVID-19 Suspicion:  no  Sepsis present:  no  Reassessment needed: N/A  Code Status:  Do Not Resuscitate  Readmission: no  Isolation Requirements:  no  Recommended Level of Care:  telemetry  Department:[facility] - [phone]  Other: Patient presents after unknown responsive episode early this morning. EKG shows new T wave inversions in the inferior leads. Troponin returned 50. Bicarb returned 46. Urinalysis pending.          LABORATORY TESTS:  Labs Reviewed   METABOLIC PANEL, COMPREHENSIVE - Abnormal; Notable for the following components: Result Value    Chloride 94 (*)     CO2 42 (*)     Anion gap 2 (*)     Glucose 101 (*)     BUN 25 (*)     BUN/Creatinine ratio 22 (*)     ALT (SGPT) <6 (*)     AST (SGOT) 12 (*)     Albumin 2.7 (*)     Globulin 5.3 (*)     A-G Ratio 0.5 (*)     All other components within normal limits   CBC WITH AUTOMATED DIFF - Abnormal; Notable for the following components:    RBC 4.08 (*)     HGB 10.1 (*)     HCT 35.3 (*)     MCH 24.8 (*)     MCHC 28.6 (*)     RDW 15.9 (*)     IMMATURE GRANULOCYTES 1 (*)     ABS. IMM. GRANS. 0.1 (*)     All other components within normal limits   URINE CULTURE HOLD SAMPLE   SAMPLES BEING HELD   URINALYSIS W/MICROSCOPIC   TROPONIN-HIGH SENSITIVITY       IMAGING RESULTS:  XR CHEST PORT   Final Result   1. Bilateral pleural effusions with associated passive atelectasis and/or   consolidation. CT HEAD WO CONT   Final Result   1. No evidence of acute intracranial abnormality by this modality. MEDICATIONS GIVEN:  Medications - No data to display      IMPRESSION/ PLAN:  1. Unresponsive episode    2. Elevated troponin    3. Abnormal EKG      - admit to hospitalist    Total critical care time spent exclusive of procedures:  0 minutes      Timothy J. Leoma Oppenheim, MD

## 2022-04-04 NOTE — PROGRESS NOTES
Advance Care Planning (ACP) Provider Note - Comprehensive      Date of ACP Conversation:  04/04/22    Persons included in Conversation:  patient   Length of ACP Conversation in minutes:  16 minutes     Authorized Decision Maker (if patient is incapable of making informed decisions): This person is: Mrs Burnett New for ALL Patients with Decision Making Capacity:  Importance of advance care planning, including choosing a healthcare agent to communicate patient's healthcare decisions if patient lost the ability to make decisions. Review of Existing Advance Directive:  Patient and family do not have an advance directive readily available for review. Active Diagnoses:   Unresponsiveness    These active diagnoses are of sufficient risk that focused discussion on advance care planning is indicated in order to allow the patient to thoughtfully consider personal goals of care; and, if situations arise that prevent the ability to personally give input, to ensure appropriate representation of their personal desires for different levels and aggressiveness of care. For Serious or Chronic Illness:  Understanding of medical condition     Reviewed pt's clinical status. Andrzej Montejo has multiple medical problems including  chronic A. fib, diastolic CHF, CVA, orthostatic hypotension, Parkinson's disease, dementia and is being admitted for unresponsiveness. We reviewed our treatment plan and anticipated discharge plans. Further, we discussed pt's wishes on how  he would like to proceed (aggressive/heroic resuscitation vs not intervening and allowing nature takes its course) if  he were to suffer cardiopulmonary arrest.    Understanding of CPR, goals and expected outcomes, benefits and burdens discussed. Information on CPR success provided (many factors lower a patients chance of survival, including advanced age, performance status, malignancy, and presence of multiple comorbidities);  Individual asked to communicate understanding of information in his/her own words. CPR works best to restart the heart when there is a sudden event, like a heart attack, in someone who is otherwise healthy. Unfortunately, CPR does not typically restart the heart for people who have serious health conditions or who are very sick. \"     \"In the event your heart stopped as a result of an underlying serious health condition, would you want attempts to be made to restart your heart (answer \"yes\" for attempt to resuscitate) or would you prefer a natural death (answer \"no\" for do not attempt to resuscitate)? \"  No    Patient made it very clear to me that  he would not want heroic measures for resuscitation in the event of cardiopulmonary arrest, including chest compressions, defibrillation, intubation/mechanical ventilation.      Interventions Provided:  Referral made for ACP follow-up assistance to: Palliative care

## 2022-04-04 NOTE — ED TRIAGE NOTES
EMS reports family called 911 because family stated pt has been looking away and not responding. Family states he is currently being seen by neurologist for possible Parkinson's disease. EMS reports pt had CVA 6 years ago and is normally weak on right side.

## 2022-04-04 NOTE — PROGRESS NOTES
Patient was admitted this AM.  Please see Dr. Yves James note for details. Awaiting Neuro eval for possible seizures. Also consult pulm for pleural effusion, pleural drain.   Also check ABG due to met alkalosis

## 2022-04-04 NOTE — ED NOTES
Patient noted to be wet; skin cleansed and sheets/diaper changed. Patient turned to left side; repositioned with a pillow. Patient left with call bell in reach; denies any other needs at this time.

## 2022-04-04 NOTE — PROGRESS NOTES
CARE MANAGEMENT INITIAL ASSESSEMENT      NAME:   Laura Baldwin   :     1935   MRN:     [id number]       Emergency Contact:  Extended Emergency Contact Information  Primary Emergency Contact: Naheed Hastings [address] Phone: [phone]  Relation: Spouse  Secondary Emergency Contact: Mitchell Gray Home Phone: [phone]  Mobile Phone: [phone]  Relation: Son    Advance Directive:  DNR, does not have an advance directive. [facility] Decision Maker:    Primary Decision Maker: Ace Toña - Spouse - [phone]    Secondary Decision Maker: Juanito Abreu - [phone]     Reason for Admission:  Mr. Harsh Lee is a 81 y.o. male with history that includes anemia, AFIB, CKD, CVA, CHF and Parkinson disease  who was emergently admitted for:  unresponsiveness    Patient Active Problem List   Diagnosis Code    Atrial fibrillation with RVR ([facility] I48.91    CHF (congestive heart failure), NYHA class I, chronic, diastolic (HCC) C92.96    Hx of completed stroke Z86.73    Orthostatic hypotension I95.1    Parkinson disease ([facility] G20    Weakness generalized R53.1    Anemia D64.9    Hypoalbuminemia E88.09    Proteinuria R80.9    CKD (chronic kidney disease), stage III ([facility]) N18.30    Chronic a-fib (HCC) I48.20    Acute encephalopathy G93.40    Seizure ([facility] R56.9    Pleural effusion, bilateral J90    Unresponsiveness R41.89       Assessment: In person with spouse Fabiola Gallo. RUR:  17%  Risk Level: Moderate  Value-based purchasing:  Yes  Bundle patient:  No    Residency:  Private residence  Exterior Steps:  None  Interior Steps:  None    Lives With:  Spouse/Significant Other    Prior functioning:  Dependent.   Patient requires assistance with:  Bathing, Dressing, Transferring and Toileting    Prior DME required:  Rolling walker, Wheelchair, Grab bars, Shower chair and Home O2 (3L/Provider Erich)    DME available:  Same as above    Rehab history:  Home Health:  Provider - will  Date - CLOSED    Discharge Concerns/Barriers Identified:  None      Insurer:  Payor: [place] MEDICARE / Plan: VA MEDICARE PART A & B / Product Type: Medicare /     PCP: Toni Ugarte MD   Name of Practice:  [facility]   Current patient: Yes   Approximate date of last visit: about 1 month   Access to virtual PCP visits:  Unknown    Pharmacy:  [facility]   Financial/Difficulty affording medications:  None identified    COVID-19 vaccination status:  Fully vaccinated. Pt/family unable to recall dates. DC Transport:  Family      Transition of care plan:  Unable to determine at this time. Awaiting clinical progress, and disposition recommendations     Comments:   Pt admitted on 4/4/22 for unresponsiveness. Pt is off the floor. CM completed assessment with spouse in the room. Pt lives at home with spouse. Pt's son and daughter in law live nearby. Pt has hx of HH through [facility]. Pt is not currently open to [facility] services. Pt has home O2 at 3L through [address]. Spouse confirms Pt uses O2 at all times. Pt has a walker, w/c, shower chair, and grab bars. Pt is dependent with most ADLs. Spouse is Pt's primary caregiver. Pt is able to feed himself. Spouse states that Pt has not walked for 2-3 weeks. During this time, Pt has been using w/c for mobility and spouse assists with transfers. Spouse states for the past 3 days Pt has started shaking when he tries to stand. Pt is retired. Pt is not able to drive. Spouse states that she has hired [employer] 1 day/week to assist her and help with appts. Initially, spouse states that Pt will return home with her. Spouse is interested in [facility] and possibly a hospital bed upon discharge. Spouse states she will transport Pt home. However, CM returned to follow up with spouse when neurologist was in room. Neuro states that Pt might need long term care. CM told spouse CM would follow up to discuss after neuro visit. CM will continue to follow.      4:44 PM  BASIM entered room while neurologist was meeting with spouse. Neuro is suggested long term placement. CM informed spouse that CM would follow up after neuro exam was completed. CM met with spouse to discuss long term placement. CM provided Pt with SNF list for review. Pt has hx of rehab at [facility]. CM informed spouse if she was interested in SNF then long term placement to discuss with floor CM tomorrow. CM asked spouse to choose 3-4 facilities in case beds were not available. Spouse voiced understanding. CM discussed payment options for long term care. Pt does not have long term care insurance. Spouse is agreeable to [facility] referral for possible long term placement (or future personal care needs). Spouse seems emotionally overwhelmed at this time. CM provided support. CM encouraged spouse to make decision that was best for Pt, spouse, and family. CM also encouraged spouse to speak with other family members to discuss options. Spouse voiced understanding. CM will continue to follow. _____________________________________  AMILCAR Catalan - Care Management  4/4/2022   11:47 AM      Care Management Interventions  PCP Verified by CM: Yes (Kana Giles MD)  Mode of Transport at Discharge:  Other (see comment) (TBD- family vs w/c [facility] [place])  Transition of Care Consult (CM Consult): Discharge Planning  MyChart Signup: No  Discharge Durable Medical Equipment: No  Physical Therapy Consult: No  Occupational Therapy Consult: No  Speech Therapy Consult: No  Support Systems: Spouse/Significant Other  Confirm Follow Up Transport: Family  Discharge Location  Patient Expects to be Discharged to[de-identified] Unable to determine at this time

## 2022-04-04 NOTE — ED NOTES
Mepilex dressing applied to sacral wound. Pt positioned with pillow on left side to take pressure off of sacrum.

## 2022-04-04 NOTE — PROCEDURES
[facility] [address]     Electroencephalogram Report    Procedure ID: SFA  Procedure Date: 04/04/2022   Patient Name: Paige Hidalgo YOB: 1935   Procedure Type: Routine Medical Record No: [id number]     INDICATION: Syncope    STATE:  Awake and sleep . DESCRIPTION OF PROCEDURE: Electrodes were applied in accordance with the international 10-20 system of electrode placement. EEG was reviewed in both bipolar and referential montages. Description of Activity:  During wakefulness, patient is able to mount 7-8 Hz symmetric posterior frequency. During drowsiness, there is attenuation of the underlying frequency and low voltage theta activity occurs bilaterally. Sleep spindles occur and are symmetric. Intermittent photic stimulation was performed and did not induce posterior driving responses. No sharp or spike discharges, seizures or epileptiform discharges seen. No focal asymmetry. Clinical Interpretation: This EEG, performed during wakefulness and sleep is mild abnormal.  There is mild generalized slowing typically seen in encephalopathies. There is no focal asymmetry, seizures or epileptiform discharges seen.        Medications:  Current Facility-Administered Medications   Medication Dose Route Frequency    apixaban (ELIQUIS) tablet 5 mg  5 mg Oral BID    carbidopa-levodopa (SINEMET)  mg per tablet 1.5 Tablet  1.5 Tablet Oral QID    digoxin (LANOXIN) tablet 0.25 mg  0.25 mg Oral DAILY    [Held by provider] midodrine (PROAMATINE) tablet 2.5 mg  2.5 mg Oral TID    sodium chloride (NS) flush 5-40 mL  5-40 mL IntraVENous Q8H    sodium chloride (NS) flush 5-40 mL  5-40 mL IntraVENous PRN    acetaminophen (TYLENOL) tablet 650 mg  650 mg Oral Q6H PRN    Or    acetaminophen (TYLENOL) suppository 650 mg  650 mg Rectal Q6H PRN    polyethylene glycol (MIRALAX) packet 17 g  17 g Oral DAILY PRN    ondansetron (ZOFRAN ODT) tablet 4 mg  4 mg Oral Q8H PRN    Or  ondansetron (ZOFRAN) injection 4 mg  4 mg IntraVENous Q6H PRN    hydrALAZINE (APRESOLINE) 20 mg/mL injection 20 mg  20 mg IntraVENous Q6H PRN     Current Outpatient Medications   Medication Sig    digoxin (LANOXIN) 0.25 mg tablet Take 0.25 mg by mouth daily.  acetaminophen (Tylenol Extra Strength) 500 mg tablet Take 1,000 mg by mouth two (2) times a day.  apixaban (Eliquis) 5 mg tablet Take 5 mg by mouth two (2) times a day.  midodrine (PROAMATINE) 2.5 mg tablet Take 2.5 mg by mouth three (3) times daily.  carbidopa-levodopa (SINEMET)  mg per tablet Take 1.5 Tablets by mouth four (4) times daily.

## 2022-04-04 NOTE — CONSULTS
NEUROLOGY CONSULT NOTE    Patient ID:  Marilyn Howe  [id number]  88 y.o.  1935    Date of Consultation:  April 4, 2022    Referring Physician: Dr. Mo Salazar and Dr. Polly Lundborg Do    Reason for Consultation:  Question of seizures, altered mental status    History of Present Illness:     Patient Active Problem List    Diagnosis Date Noted    Acute encephalopathy 04/04/2022    Seizure ([address] 04/04/2022    Pleural effusion, bilateral 04/04/2022    Unresponsiveness 04/04/2022    Weakness generalized 02/06/2022    Atrial fibrillation with RVR ([address]     CHF (congestive heart failure), NYHA class I, chronic, diastolic ([address]     Hx of completed stroke     Orthostatic hypotension     Parkinson disease ([address]     Anemia     Hypoalbuminemia     Proteinuria     CKD (chronic kidney disease), stage III ([address]     Chronic a-fib ([address]      Past Medical History:   Diagnosis Date    Anemia     Atrial fibrillation ([address]     CHF (congestive heart failure), NYHA class I, chronic, diastolic ([address]     has had to have lungs drained.  CKD (chronic kidney disease), stage III ([address]     Hx of completed stroke     right sided affected    Hypoalbuminemia     Orthostatic hypotension     Parkinson disease (HCC)     Proteinuria       Past Surgical History:   Procedure Laterality Date    HX CHOLECYSTECTOMY      HX KNEE ARTHROSCOPY Left       Prior to Admission medications    Medication Sig Start Date End Date Taking? Authorizing Provider   digoxin (LANOXIN) 0.25 mg tablet Take 0.25 mg by mouth daily. Yes Other, MD Parish   acetaminophen (Tylenol Extra Strength) 500 mg tablet Take 1,000 mg by mouth two (2) times a day. Yes Provider, Historical   apixaban (Eliquis) 5 mg tablet Take 5 mg by mouth two (2) times a day. Yes Cedric, MD Parish   midodrine (PROAMATINE) 2.5 mg tablet Take 2.5 mg by mouth three (3) times daily.    Yes Cedric, MD Parish   carbidopa-levodopa (SINEMET)  mg per tablet Take 1.5 Tablets by mouth four (4) times daily. Yes Other, MD Parish     No Known Allergies   Social History     Tobacco Use    Smoking status: Not on file    Smokeless tobacco: Not on file   Substance Use Topics    Alcohol use: Not on file      No family history on file. Subjective:      Beatrice Sharp is a 81 y.o. male with history of stroke in 2016 (right-sided weakness and aphasia), peripheral neuropathy, atrial fibrillation, orthostatic hypotension, CHF, pleural effusion, chronic kidney disease, anemia and Parkinson's disease was admitted from the ER early this morning for loss of consciousness. Patient had 2 in the morning today got up to go to the bathroom and then became unresponsive and he was just staring. Lasted for about a minute. Has had few other episodes similar this past weekend. EMS was called and patient was brought to the ER. In the ER blood pressure was 146/53. Labs revealed decreased hemoglobin at 10.1, decreased RBC at 4.08, decreased chloride at 94, increased CO2 at 42, increased BUN to 25, decreased albumin at 2.7, elevated proBNP at 7007, ABG showing elevated PCO2 at 62 and bicarbonate 36. Unremarkable troponin, urinalysis, TSH and procalcitonin. Head CT without contrast did not reveal any acute abnormality. Periventricular and subcortical white matter disease. Chest x-ray revealed bilateral pleural effusion with associated passive atelectasis and/or consolidation. Brain MRI without contrast did not reveal any acute abnormality. No acute stroke. Generalized parenchymal volume loss. Confluent periventricular and deep white matter disease and patchy white matter changes in the ganesh consistent with severe chronic microvascular ischemic disease. Chronic hemorrhage with encephalomalacia in the left basal ganglia. Patient has no recollection of the events. Per wife condition occurs when she tries to stand the patient up he then becomes tremulous and unresponsive and stares briefly.   Better when sitting down or laying down. Wife is in the process of looking for help for care at home. They have been having difficulty having him follow-up with his movement disorder doctors at [facility] since Dr. Jackie Pantoja left. There is set to see Dr. Frances Burgos next week for further evaluation and management. She is receptive to alternative living for his care.  was informed. Outside reports reviewed: office notes, ER records, radiology reports, lab reports. Review of Systems:    Pertinent items are noted in HPI. Objective:     Patient Vitals for the past 8 hrs:   BP Temp Pulse Resp SpO2 Height Weight   04/04/22 0953 (!) 168/64  63       04/04/22 0900 (!) 168/64  67  100 %     04/04/22 0502 (!) 168/71  68 16 100 %     04/04/22 0343 (!) 163/75  66 17 100 %     04/04/22 0323     100 %     04/04/22 0313 (!) 146/53 98.3 °F (36.8 °C) 79 16 100 % 5' 11\" (1.803 m) 68.5 kg (151 lb 0.2 oz)     PHYSICAL EXAM:    NEUROLOGICAL EXAM:    Appearance: The patient is thin, does not provide a coherent history and is in no acute distress. Mental Status: Oriented to person. Fluent, no aphasia or dysarthria. Able to name objects and follow simple commands. Cranial Nerves:   Intact visual fields. RADHA, EOM's full, no nystagmus, no ptosis. Facial sensation is normal. Corneal reflexes are intact. Facial movement is symmetric. Hearing is normal bilaterally. Palate is midline with normal elevation. Sternocleidomastoid and trapezius muscles are normal. Tongue is midline. Motor:  Very mild right sided weakness. 5/5 left extremity. Cogwheel rigidity LUE. No pronator drift. Reflexes:   Deep tendon reflexes 2+/4 and symmetrical. Downgoing toes. Sensory:   Normal to cold. Gait:  Not tested. Tremor:   Pill rolling resting tremors LUE. Cerebellar:  Intact FTN/HAILY/HTS but slow on the left.        Imaging  CT Head, brain MRI: reviewed    Lab Review    Recent Results (from the past 24 hour(s))   METABOLIC PANEL, COMPREHENSIVE    Collection Time: 04/04/22  3:31 AM   Result Value Ref Range    Sodium 138 136 - 145 mmol/L    Potassium 4.1 3.5 - 5.1 mmol/L    Chloride 94 (L) 97 - 108 mmol/L    CO2 42 (HH) 21 - 32 mmol/L    Anion gap 2 (L) 5 - 15 mmol/L    Glucose 101 (H) 65 - 100 mg/dL    BUN 25 (H) 6 - 20 MG/DL    Creatinine 1.15 0.70 - 1.30 MG/DL    BUN/Creatinine ratio 22 (H) 12 - 20      GFR est AA >60 >60 ml/min/1.73m2    GFR est non-AA >60 >60 ml/min/1.73m2    Calcium 9.3 8.5 - 10.1 MG/DL    Bilirubin, total 0.9 0.2 - 1.0 MG/DL    ALT (SGPT) <6 (L) 12 - 78 U/L    AST (SGOT) 12 (L) 15 - 37 U/L    Alk. phosphatase 65 45 - 117 U/L    Protein, total 8.0 6.4 - 8.2 g/dL    Albumin 2.7 (L) 3.5 - 5.0 g/dL    Globulin 5.3 (H) 2.0 - 4.0 g/dL    A-G Ratio 0.5 (L) 1.1 - 2.2     CBC WITH AUTOMATED DIFF    Collection Time: 04/04/22  3:31 AM   Result Value Ref Range    WBC 7.9 4.1 - 11.1 K/uL    RBC 4.08 (L) 4.10 - 5.70 M/uL    HGB 10.1 (L) 12.1 - 17.0 g/dL    HCT 35.3 (L) 36.6 - 50.3 %    MCV 86.5 80.0 - 99.0 FL    MCH 24.8 (L) 26.0 - 34.0 PG    MCHC 28.6 (L) 30.0 - 36.5 g/dL    RDW 15.9 (H) 11.5 - 14.5 %    PLATELET 399 002 - 813 K/uL    MPV 8.9 8.9 - 12.9 FL    NRBC 0.0 0  WBC    ABSOLUTE NRBC 0.00 0.00 - 0.01 K/uL    NEUTROPHILS 75 32 - 75 %    LYMPHOCYTES 13 12 - 49 %    MONOCYTES 10 5 - 13 %    EOSINOPHILS 2 0 - 7 %    BASOPHILS 0 0 - 1 %    IMMATURE GRANULOCYTES 1 (H) 0.0 - 0.5 %    ABS. NEUTROPHILS 5.9 1.8 - 8.0 K/UL    ABS. LYMPHOCYTES 1.0 0.8 - 3.5 K/UL    ABS. MONOCYTES 0.8 0.0 - 1.0 K/UL    ABS. EOSINOPHILS 0.1 0.0 - 0.4 K/UL    ABS. BASOPHILS 0.0 0.0 - 0.1 K/UL    ABS. IMM. GRANS. 0.1 (H) 0.00 - 0.04 K/UL    DF AUTOMATED     SAMPLES BEING HELD    Collection Time: 04/04/22  3:31 AM   Result Value Ref Range    SAMPLES BEING HELD 1RED,1BLU,1SST     COMMENT        Add-on orders for these samples will be processed based on acceptable specimen integrity and analyte stability, which may vary by analyte.    [facility] SENSITIVITY    Collection Time: 04/04/22  3:31 AM   Result Value Ref Range    Troponin-High Sensitivity 50 0 - 76 ng/L   TSH 3RD GENERATION    Collection Time: 04/04/22  3:31 AM   Result Value Ref Range    TSH 2.63 0.36 - 3.74 uIU/mL   NT-PRO BNP    Collection Time: 04/04/22  3:31 AM   Result Value Ref Range    NT pro-BNP 7,007 (H) <450 PG/ML   PROCALCITONIN    Collection Time: 04/04/22  3:31 AM   Result Value Ref Range    Procalcitonin 0.18 ng/mL   URINALYSIS W/MICROSCOPIC    Collection Time: 04/04/22  4:45 AM   Result Value Ref Range    Color DARK YELLOW      Appearance CLEAR CLEAR      Specific gravity 1.023 1.003 - 1.030      pH (UA) 6.5 5.0 - 8.0      Protein 30 (A) NEG mg/dL    Glucose Negative NEG mg/dL    Ketone 15 (A) NEG mg/dL    Blood Negative NEG      Urobilinogen 1.0 0.2 - 1.0 EU/dL    Nitrites Negative NEG      Leukocyte Esterase TRACE (A) NEG      WBC 0-4 0 - 4 /hpf    RBC 0-5 0 - 5 /hpf    Epithelial cells FEW FEW /lpf    Bacteria Negative NEG /hpf    Hyaline cast 0-2 0 - 2 /lpf   URINE CULTURE HOLD SAMPLE    Collection Time: 04/04/22  4:45 AM    Specimen: Serum; Urine   Result Value Ref Range    Urine culture hold        Urine on hold in Microbiology dept for 2 days. If unpreserved urine is submitted, it cannot be used for addtional testing after 24 hours, recollection will be required.    BILIRUBIN, CONFIRM    Collection Time: 04/04/22  4:45 AM   Result Value Ref Range    Bilirubin UA, confirm Positive     BLOOD GAS, ARTERIAL    Collection Time: 04/04/22  7:12 AM   Result Value Ref Range    pH 7.38 7.35 - 7.45      PCO2 62 (H) 35 - 45 mmHg    PO2 123 (H) 80 - 100 mmHg    O2 SAT 98 (H) 92 - 97 %    BICARBONATE 36 (H) 22 - 26 mmol/L    BASE EXCESS 7.9 mmol/L    O2 METHOD NASAL O2      O2 FLOW RATE 3 L/min    Sample source ARTERIAL ([facility])      SITE RIGHT RADIAL      HERMINIO'S TEST YES           Assessment:   Syncope and collapse  Orthostatic hypotension  Acute metabolic encephalopathy  Parkinson's disease  History of CVA    Plan:   Neurological examination reveals very mild right-sided weakness which is residual from his previous left basal ganglia stroke, cognitive impairment, left upper extremity cogwheel rigidity, bradykinesia and resting pill-rolling tremor. Findings consistent with history of Parkinson's disease and current elements of encephalopathy. Event of passing out is more consistent with syncope secondary to history of orthostatic hypotension. Movement disorders clinic at [facility] were considering possible multisystem atrophy with parkinsonism given significant orthostatic hypotension. Patient is currently on midodrine. Patient is set to see a new movement disorder specialist next week and likely will be a candidate for Northera. Unfortunately Sinemet can also worsen orthostatic hypotension. Continue home dosage of Sinemet 25/100, 1.5 tablets 4 times a day. No indication to change again given risk of worsening orthostatic hypotension. May need cardiology evaluation given history of atrial fibrillation in the setting as well of having issues with orthostatic hypotension. Patient is currently on midodrine. Continue Eliquis given his history of prior stroke. Discussed with patient impression and findings with patient's wife who is the primary caregiver. She is receptive for alternative care aside from home care. Patient may benefit from SNF. Baseline evaluation by speech, PT and OT. EEG done today did not reveal any seizures. Mild generalized slowing typically seen in encephalopathies. No indication to start any AED. No further recommendations from a neurological standpoint. Thank you for the consult. This note was created using voice recognition software. Despite editing, there may be syntax errors.

## 2022-04-05 NOTE — WOUND CARE
Wound Nurse Note    Initial consult received to see patient regarding sacral redness. Patient currently resting on a Harvey bed; turns well with some assist. Spouse at bedside reports that patient spends a lot of time at home in his recliner. Assessment:  Sacral area/buttocks - blanching redness at midline with surrounding areas of hypertropic skin changes likely related to chronic chafing from chair. Bilateral heels - blanching redness; dry skin. Right flank - dressing in place over pleural drain; dry and intact did not disturb. Recommendations:  Turn q2h and float heels; off-loading boots applied. Mepilex sacral dressing; change MWF after cleansing with saline or wound cleanser. Moisturizer daily to dry skin on heels. Plan:  Notified MD of recommendations. Will follow as needed; reconsult if further assistance is needed.     Agustin Jenkins RN [facility], [facility].  [facility] Wound Dept  [phone]

## 2022-04-05 NOTE — PROGRESS NOTES
4/5/2022  Case Management Progress Note    3:43 PM  Received call from [facility], patient is in fact currently open to them for nursing only. NATALIIA Rasmussen    2:18 PM  Patient is 81year old male admitted 4/4 with acute encephalopathy  Patient's RUR is 18% yellow/moderate risk for readmission  Covid test: none this admission  Chart reviewed--patient received from ED this afternoon  Per chart patient and wife were provided SNF list to look for long term care. Met with patient and wife at bedside, introduced myself and my role. Patient's wife states that at this time they are strongly leaning toward going home with more help. They already have [facility] one day a week at home, and patient's wife understands that they will need more help than that. Discussed resource of [facility] Bridge to Home program, which provides an aide for 14 visits. Patient's wife was interested in this. Let her know that I would continue to follow and please let me know if she had any questions. Transition of Care Plan   1. Continue medical management/treatment  2. Family leaning toward home with resources like hospital bed and increased aide assistance   3. Transportation: tbd pending progress  4.  CM will continue to follow    NATALIIA Rasmussen

## 2022-04-05 NOTE — PROGRESS NOTES
Palliative Medicine Social Work Note      SW met with pt and wife Amirah Castillo at bedside. Pt was awake in bed, speech was slightly slurred but understandable. Pt was able to state reason for hospitalization (\"I passed out\"), has been hospitalized for similar events over the past 6 yrs, both at [facility] and at [address] [place] [address]. Wife stated they downsized to a one story home close to son and dtr-in-law, shared that son and DIL both work but are a source of support. Wife reports pt's appetite has been poor recently, reports pt has had numerous falls. Pt was followed by Neurologist at [address] [place] [address] until that provider moved out of state. Pt has an appt next week (4/13) to establish with a new Neurologist; wife is hopeful that appt will provide some guidance as to what to expect moving forward. Wife was receptive to meeting with Palliative Medicine team on 4/6 (Dr. Carolina Friedman), stated she plans to be at the bedside most of the day (9a-5p). SW discussed above with Care Manager. Thank you for including Palliative Medicine in the care of Mr. Alek Burks.      [address] [place] [address], [facility]  Palliative Medicine:  [phone])

## 2022-04-05 NOTE — PROGRESS NOTES
Music Therapy [address] [place] [address] [id number]     1935  81 y.o.  male    Patient Telephone Number: [phone] (home)   Sabianism Affiliation: No Nondenominational   Language: English   Patient Active Problem List    Diagnosis Date Noted    Acute encephalopathy 04/04/2022    Seizure ([facility] 04/04/2022    Pleural effusion, bilateral 04/04/2022    Unresponsiveness 04/04/2022    Weakness generalized 02/06/2022    Atrial fibrillation with RVR ([facility] [facility]     CHF (congestive heart failure), NYHA class I, chronic, diastolic ([facility] [facility]     Hx of completed stroke     Orthostatic hypotension     Parkinson disease ([facility] [address] [facility]     Anemia     Hypoalbuminemia     Proteinuria     CKD [facility]chronic [facility] disease[facility] stage III [facility]     Chronic a-fib ([facility]         Date: 4/5/2022            Total Time (in minutes): 5          SFM 5M1 MED SURG 1    Mental Status:   [x] Alert [  ] Carmelita Days [  ]  Confused  [  ] Minimally responsive  [  ] Sleeping    Communication Status: [  ] Impaired Speech [  ] Nonverbal -N/A    Physical Status:   [x] Oxygen in use  [  ] Hard of Hearing [  ] Vision Impaired  [  ] Ambulatory  [  ] Ambulatory with assistance [  ] Non-ambulatory     Music Preferences, Background: N/A: Please see Session Observations below. Clinical Problem addressed: N/A: Please see Session Observations below. Goal(s) met in session: N/A: Please see Session Observations below.   Physical/Pain management (Scale of 1-10):    Pre-session rating ___________    Post-session rating __________  [  ] Increased relaxation   [  ] Affected breathing patterns  [  ] Decreased muscle tension   [  ] Decreased agitation  [  ] Affected heart rate    [  ] Increased alertness     Emotional/Psychological:  [  ] Increased self-expression   [  ] Decreased aggressive behavior   [  ] Decreased feelings of stress  [  ] Discussed healthy coping skills     [  ] Improved mood    [  ] Decreased withdrawn behavior     Social:  [  ] Decreased feelings of isolation/loneliness [  ] Positive social interaction   [  ] Provided support and/or comfort for family/friends    Spiritual:  [  ] Spiritual support    [  ] Expressed peace  [  ] Expressed hank    [  ] Discussed beliefs    Techniques Utilized (Check all that apply): N/A: Please see Session Observations below. [  ] Procedural support MT [  ] Music for relaxation [  ] Patient preferred music  [  ] Kira analysis  [  ] Song choice  [  ] Music for validation  [  ] Entrainment  [  ] Movement to music [  ] Guided visualization  [  ] Jeffrey Cheadle  [  ] Patient instrument playing [  ] Toy Craw writing  [  ] Selam Peaks along   [  ] Ishan Salinas  [  ] Sensory stimulation  [  ] Active Listening  [  ] Music for spiritual support [  ] Making of CDs as gifts    Session Observations:  Referral from BRIANDA Sanz, Palliative . Patient (pt) was alert lying in bed. A nurse was at bedside, as was the pt's spouse. MT greeted each of them, and introduced self. The pt's spouse shared she was on hold on the telephone with a pharmacist. She said that neither she nor the pt had ever been music people and she didn't think the pt would want music therapy. She declined a session and denied further needs at this time. MT expressed understanding and looked back to the pt, but the pt didn't speak. MT concluded visit.     Otoniel Luna MT-BC (Music Therapist-Board Certified)  Spiritual Care Department  Referral-based service

## 2022-04-05 NOTE — PROGRESS NOTES
Problem: Pressure Injury - Risk of  Goal: *Prevention of pressure injury  Description: Document Luisito Scale and appropriate interventions in the flowsheet.   Outcome: Progressing Towards Goal  Note: Pressure Injury Interventions:  Sensory Interventions: Assess changes in LOC,Avoid rigorous massage over bony prominences,Check visual cues for pain,Float heels,Keep linens dry and wrinkle-free    Moisture Interventions: Absorbent underpads,Check for incontinence Q2 hours and as needed,Internal/External urinary devices    Activity Interventions: Increase time out of bed,Pressure redistribution bed/mattress(bed type),PT/OT evaluation    Mobility Interventions: HOB 30 degrees or less,Pressure redistribution bed/mattress (bed type),PT/OT evaluation    Nutrition Interventions: Document food/fluid/supplement intake    Friction and Shear Interventions: Apply protective barrier, creams and emollients,Foam dressings/transparent film/skin sealants,HOB 30 degrees or less,Lift sheet,Lift team/patient mobility team

## 2022-04-05 NOTE — CONSULTS
Palliative Medicine Consult  Raffaele: [phone] ([phone])    Patient Name: Sherri Multani  YOB: 1935    Date of Initial Consult: 4/5/22  Reason for Consult: Care decisions   Requesting Provider: Giovana Tijerina   Primary Care Physician: Aurea Harris MD     SUMMARY:   Sherri Multani is a 81 y.o. with a past history of a fib on eliquis, diastolic CHF w/ hx of pleural effusions w/ pleural drain on R, CVA, Parkinson's disease, dementia, orthostatic hypotension who was admitted on 4/4/2022 from home after wife found him unresponsive and shaking. Pulm eval- has acute on chronic respiratory failure- mult thoracnteses required b/l since 12/2021 and R pleurx drain placed last month. Neuro eval- EEG w/ generalized slowing. MRI brain w/out acute findings. Did fall out of bed last night, CT head stable. Current medical issues leading to Palliative Medicine involvement include: care decisions. Social: Pt  to supportive wife Aubrey Cifuentes. They have one child- son Reji Yoon who is local (and his wife Mecca Sosa). She has caretakers to help at home. PALLIATIVE DIAGNOSES:   1. Parkinson's disease   2. Cognitive deficits - mental status waxes/wanes  3. Shortness of breath - with recurrent pleural effusions   4. Poor appetite  5. Increased falls        PLAN:   1. Along w/ Laquita LCSW meet w/ pt- who is able to communicate some, although fatigued and speak mostly to his wife Aubrey Cifuentes. 2. This is his 3rd hospital stay in 5 months and has been functionally declining w/ incr tremors, falls backwards, less mobile w/ walker, poor appetite and pleural effusions. 3. Wife realistic about situation, and is open about talking about end of life issues.  The plan now is to f/u with new neurologist (his previous one has moved out of town)- as his PCP wants to see if there's another medication besides sinemet for his Parkinson's (as it has been lowering his BP) next week, but if this is his new functional baseline then he may be best supported w/ hospice. 4. Pt did not want to come to the hospital this time and getting in/out of the car is more difficult. We discuss hospice philosophy some- wife wishes for her dtr in law to hear, so plan to meet again tomorrow at 330pm. If for some reason pt is discharged before then, I am able to call wife and dtr in law on the phone. We will talk more about hospice, which would support continued drainage of pleural effusions if reoccur. With incr falls, to consider d/c Eliquis in near future. 5. Confirm DNR status, DDNR to be signed before going home. 6. Communicated plan of care with: Palliative IDT, [facility] [address] Team incl Dr Aure Bhatt / TREATMENT PREFERENCES:     GOALS OF CARE:  Patient/Health Care Proxy Stated Goals: Rehabilitation    TREATMENT PREFERENCES:   Code Status: DNR    Patient and family's personal goals include: to live as well as he can       Advance Care Planning:  [x] The [facility] Interdisciplinary Team has updated the ACP Navigator with Health Care Decision Maker and Patient Capacity      Primary Decision Maker: Ace Toña - Spouse - [phone]    Secondary Decision Maker: Juanito Abreu - [phone]  Advance Care Planning 4/6/2022   Patient's Healthcare Decision Maker is: Legal Next of [facility] [address] Directive -   Patient Would Like to Complete Advance Directive -   Does the patient have other document types -       Medical Interventions: Limited additional interventions       Other:    As far as possible, the palliative care team has discussed with patient / health care proxy about goals of care / treatment preferences for patient. HISTORY:     History obtained from: chart, family, pt, staff     CHIEF COMPLAINT: \"I have to sit up\"    HPI/SUBJECTIVE:    The patient is:   [x] Verbal and participatory  [] Non-participatory due to:     Pt recognizes family and can tell us that he has to sit up and wants to use the toilet.  Is confused though. Very weak. Clinical Pain Assessment (nonverbal scale for severity on nonverbal patients):   Clinical Pain Assessment  Severity: 0          Duration: for how long has pt been experiencing pain (e.g., 2 days, 1 month, years)  Frequency: how often pain is an issue (e.g., several times per day, once every few days, constant)     FUNCTIONAL ASSESSMENT:     Palliative Performance Scale (PPS):  PPS: 50       PSYCHOSOCIAL/SPIRITUAL SCREENING:     Palliative IDT has assessed this patient for cultural preferences / practices and a referral made as appropriate to needs (Cultural Services, Patient Advocacy, Ethics, etc.)    Any spiritual / Mu-ism concerns:  [] Yes /  [x] No   If \"Yes\" to discuss with pastoral care during IDT     Does caregiver feel burdened by caring for their loved one:   [] Yes /  [x] No /  [] No Caregiver Present    If \"Yes\" to discuss with social work during IDT    Anticipatory grief assessment:   [x] Normal  / [] Maladaptive     If \"Maladaptive\" to discuss with social work during IDT    ESAS Anxiety:      ESAS Depression:       Cannot obtain due to patient factors     REVIEW OF SYSTEMS:     Positive and pertinent negative findings in ROS are noted above in HPI. The following systems were [x] reviewed / [] unable to be reviewed as noted in HPI  Other findings are noted below. Systems: constitutional, ears/nose/mouth/throat, respiratory, gastrointestinal, genitourinary, musculoskeletal, integumentary, neurologic, psychiatric, endocrine. Positive findings noted below. Modified ESAS Completed by: provider   Fatigue: 8 Drowsiness: 4     Pain: 0           Dyspnea: 0           Stool Occurrence(s): 0        PHYSICAL EXAM:     From RN flowsheet:  Wt Readings from Last 3 Encounters:   04/05/22 154 lb 5.2 oz (70 kg)   02/11/22 151 lb (68.5 kg)   12/23/21 163 lb 2.3 oz (74 kg)     Blood pressure (!) 179/74, pulse 75, temperature 98.3 °F (36.8 °C), resp.  rate 18, height 5' 11\" (1.803 m), weight 154 lb 5.2 oz (70 kg), SpO2 96 %. Pain Scale 1: Numeric (0 - 10)  Pain Intensity 1: 0                   Constitutional: awake, alert, lethargic  Eyes: pupils equal, anicteric  ENMT: no nasal discharge, moist mucous membranes  Respiratory: breathing not labored on NC  Musculoskeletal: no deformity, no tenderness to palpation  Skin: warm, dry  Neurologic: moving all extremities, +tremor        HISTORY:     Active Problems:    CHF (congestive heart failure), NYHA class I, chronic, diastolic (HCC) ()      Overview: has had to have lungs drained. Hx of completed stroke ()      Overview: right sided affected      Orthostatic hypotension ()      Parkinson disease (HCC) ()      Chronic a-fib (HCC) ()      Acute encephalopathy (4/4/2022)      Seizure ([facility] [address] (4/4/2022)      Pleural effusion, bilateral (4/4/2022)      Unresponsiveness (4/4/2022)      Past Medical History:   Diagnosis Date    Anemia     Atrial fibrillation (HCC)     CHF (congestive heart failure), NYHA class I, chronic, diastolic (HCC)     has had to have lungs drained.  CKD (chronic kidney disease), stage III ([address]     Hx of completed stroke     right sided affected    Hypoalbuminemia     Orthostatic hypotension     Parkinson disease (HCC)     Proteinuria       Past Surgical History:   Procedure Laterality Date    HX CHOLECYSTECTOMY      HX KNEE ARTHROSCOPY Left       No family history on file. History reviewed, no pertinent family history.   Social History     Tobacco Use    Smoking status: Not on file    Smokeless tobacco: Not on file   Substance Use Topics    Alcohol use: Not on file     No Known Allergies   Current Facility-Administered Medications   Medication Dose Route Frequency    apixaban (ELIQUIS) tablet 5 mg  5 mg Oral BID    carbidopa-levodopa (SINEMET)  mg per tablet 1.5 Tablet  1.5 Tablet Oral QID    digoxin (LANOXIN) tablet 0.25 mg  0.25 mg Oral DAILY    [Held by provider] midodrine (PROAMATINE) tablet 2.5 mg  2.5 mg Oral TID    sodium chloride (NS) flush 5-40 mL  5-40 mL IntraVENous Q8H    sodium chloride (NS) flush 5-40 mL  5-40 mL IntraVENous PRN    acetaminophen (TYLENOL) tablet 650 mg  650 mg Oral Q6H PRN    Or    acetaminophen (TYLENOL) suppository 650 mg  650 mg Rectal Q6H PRN    polyethylene glycol (MIRALAX) packet 17 g  17 g Oral DAILY PRN    ondansetron (ZOFRAN ODT) tablet 4 mg  4 mg Oral Q8H PRN    Or    ondansetron (ZOFRAN) injection 4 mg  4 mg IntraVENous Q6H PRN    hydrALAZINE (APRESOLINE) 20 mg/mL injection 20 mg  20 mg IntraVENous Q6H PRN          LAB AND IMAGING FINDINGS:     Lab Results   Component Value Date/Time    WBC 6.2 04/06/2022 02:35 AM    HGB 9.3 (L) 04/06/2022 02:35 AM    PLATELET 321 34/75/2132 02:35 AM     Lab Results   Component Value Date/Time    Sodium 140 04/06/2022 02:35 AM    Potassium 3.7 04/06/2022 02:35 AM    Chloride 94 (L) 04/06/2022 02:35 AM    CO2 42 (HH) 04/06/2022 02:35 AM    BUN 26 (H) 04/06/2022 02:35 AM    Creatinine 1.01 04/06/2022 02:35 AM    Calcium 8.7 04/06/2022 02:35 AM    Magnesium 2.4 04/05/2022 04:06 AM    Phosphorus 2.7 04/05/2022 04:06 AM      Lab Results   Component Value Date/Time    Alk.  phosphatase 65 04/04/2022 03:31 AM    Protein, total 8.0 04/04/2022 03:31 AM    Albumin 2.7 (L) 04/04/2022 03:31 AM    Globulin 5.3 (H) 04/04/2022 03:31 AM     Lab Results   Component Value Date/Time    INR 1.1 02/06/2022 08:47 PM    Prothrombin time 11.8 (H) 02/06/2022 08:47 PM    aPTT 32.7 (H) 02/06/2022 08:47 PM      Lab Results   Component Value Date/Time    Iron 23 (L) 12/21/2021 04:12 AM    TIBC 207 (L) 12/21/2021 04:12 AM    Iron % saturation 11 (L) 12/21/2021 04:12 AM    Ferritin 340 02/06/2022 08:47 PM      Lab Results   Component Value Date/Time    pH 7.38 04/04/2022 07:12 AM    PCO2 62 (H) 04/04/2022 07:12 AM    PO2 123 (H) 04/04/2022 07:12 AM     No components found for: GLPOC   No results found for: CPK, CKMB             Total time: 70 min Counseling / coordination time, spent as noted above: 50 min   > 50% counseling / coordination?: yes    Prolonged service was provided for  []30 min   []75 min in face to face time in the presence of the patient, spent as noted above. Time Start:   Time End:   Note: this can only be billed with 84885 (initial) or 72404 (follow up). If multiple start / stop times, list each separately.

## 2022-04-05 NOTE — PROGRESS NOTES
[facility]     Name: Marilyn Howe MRN: [id number]   : 1935 Hospital: [address] [facility]   Date: 2022        Impression Plan   1. Acute on chronic respiratory failure  2. Acute on chronic CO2 retention  3. Failure to thrive  4. Encephalopathy  5. Recurrent pleural effusions                 · Patient is severly debilitated with chronic CO2 retention, persistent pleural effusions and multiple other medical problems. Discussed with wife at bedside about the lack of long term and even lack of short term gains by performing a thoracentesis on the left. The patient has gone down hill in the last 6 months and very rapidly in the last week despite right sided pleurex drainage. Wife is agreeable to talk to palliative about end of life plans. · Neurology following  · Wean O2 as tolerated, keeping sats above 90%  · Palliative consult pending           Radiology  (personally reviewed) CXR reviewed: essentially unchanged pleural effusions compared to CXR one month ago. Subjective     Cc: pleural effusions    80 yo with PMHx of atrial fibrillation and diastolic heart failure presenting with encephalopathy, lethargy and hypoxia. Pulmonary was consulted for bilateral pleural effusions. Pt has had multiple thoracentesis done bilaterally since 2022 and had a tunneled pleural catheter placed on the right in the last month. Wife has difficulties remembering the amounts removed on once weekly drainage, but it sounds like smaller amounts (245-300cc). Pt has acute on chronic CO2 retention on ABG and only responds to noxious stimuli. Wife states that pt has eaten very little in the last few weeks and overall decline in the last 6 months. Pt is a 100% on 3 L. Interval history  Afebrile  HR 50-60s  BP stable  Sats 100% on 3L  Bicarb 42; ABG compensated  Creat 1.02 - better  TSH 2.63        Review of Systems:  Review of systems not obtained due to patient factors.   He is awake and more alert this morning. Wife states he seems to be comfortable and rested well last night. She reports he has poor appetite. Nursing attempted to drain PleurX catheter, but no fluid drained. Wife reports they typically drain once a week on Thursdays. Past Medical History:   Diagnosis Date    Anemia     Atrial fibrillation ([facility]     CHF (congestive heart failure), NYHA class I, chronic, diastolic (HCC)     has had to have lungs drained.  CKD (chronic kidney disease), stage III ([address]     Hx of completed stroke     right sided affected    Hypoalbuminemia     Orthostatic hypotension     Parkinson disease (HCC)     Proteinuria       Past Surgical History:   Procedure Laterality Date    HX CHOLECYSTECTOMY      HX KNEE ARTHROSCOPY Left       Prior to Admission medications    Medication Sig Start Date End Date Taking? Authorizing Provider   digoxin (LANOXIN) 0.25 mg tablet Take 0.25 mg by mouth daily. Yes Other, MD Parish   acetaminophen (Tylenol Extra Strength) 500 mg tablet Take 1,000 mg by mouth two (2) times a day. Yes Provider, Historical   apixaban (Eliquis) 5 mg tablet Take 5 mg by mouth two (2) times a day. Yes Cedric, MD Parish   midodrine (PROAMATINE) 2.5 mg tablet Take 2.5 mg by mouth three (3) times daily. Yes Cedric, MD Parish   carbidopa-levodopa (SINEMET)  mg per tablet Take 1.5 Tablets by mouth four (4) times daily.    Yes Cedric, MD Parish     Current Facility-Administered Medications   Medication Dose Route Frequency    apixaban (ELIQUIS) tablet 5 mg  5 mg Oral BID    carbidopa-levodopa (SINEMET)  mg per tablet 1.5 Tablet  1.5 Tablet Oral QID    digoxin (LANOXIN) tablet 0.25 mg  0.25 mg Oral DAILY    [Held by provider] midodrine (PROAMATINE) tablet 2.5 mg  2.5 mg Oral TID    sodium chloride (NS) flush 5-40 mL  5-40 mL IntraVENous Q8H     No Known Allergies   Social History     Tobacco Use    Smoking status: Not on file    Smokeless tobacco: Not on file   Substance Use Topics    Alcohol use: Not on file      No family history on file. Laboratory: I have personally reviewed the flowsheet and labs.      Recent Labs     04/05/22  0406 04/04/22  0331   WBC 6.5 7.9   HGB 9.3* 10.1*   HCT 31.9* 35.3*    229     Recent Labs     04/05/22  0406 04/04/22  0331    138   K 4.2 4.1   CL 95* 94*   CO2 42* 42*   GLU 97 101*   BUN 26* 25*   CREA 1.02 1.15   CA 9.0 9.3   MG 2.4  --    PHOS 2.7  --    ALB  --  2.7*   ALT  --  <6*       Objective:     Visit Vitals  BP (!) 164/57 (BP 1 Location: Right upper arm, BP Patient Position: Lying)   Pulse 63   Temp 98 °F (36.7 °C)   Resp 16   Ht 5' 11\" (1.803 m)   Wt 68.5 kg (151 lb 0.2 oz)   SpO2 100%   BMI 21.06 kg/m²       No intake or output data in the 24 hours ending 04/05/22 1117  EXAM:   GENERAL: elderly, debilitated, awake, somewhat responsive, HEENT:  anicteric, EOMI, no alar flaring or epistaxis, oral mucosa moist without cyanosis, NECK:  no jugular vein distention, no retractions, no thyromegaly or masses, LUNGS: Decreased breathsounds in bases bilaterally, HEART:  Regular rate and rhythm with no MGR; trace edema is present, ABDOMEN:  soft with no tenderness, bowel sounds present, EXTREMITIES:  warm with no cyanosis, SKIN:  no jaundice or ecchymosis and NEUROLOGIC:  Awake, appears comfortable, minimally responsive    ERIKA Dhaliwal  [facility] [address]

## 2022-04-05 NOTE — PROGRESS NOTES
[address] Adult  Hospitalist Group                                                                                          Hospitalist Progress Note  Dianna Lane MD        Date of Service:  2022  NAME:  Kate Garcia  :  1935  MRN:  [id number]      Admission Summary:   76-year-old male presented after possible seizure/syncopal episode    Interval history / Subjective:   Patient awake and alert in bed. Denies any complaints. Wife at bedside states he has been eating well today. No dizziness or lightheadedness but he has not gotten out of bed. Assessment & Plan:     1. Unresponsiveness (2022)  -Likely due to orthostatic hypotension  -Initially suspected seizure however EEG unremarkable other than mild generalized slowing seen in encephalopathy  -MRI with generalized parenchymal volume loss and severe microvascular ischemic disease. MRI with encephalomalacia in the left basal ganglia  -Neurology evaluated    2. Chronic atrial fibrillation. Rate is well controlled. Recent ECHO with EF 60%. Continue Eliquis and digoxin          3. Parkinson disease ([facility] Continue Sinemet     4. CHF (congestive heart failure), NYHA class I, chronic, diastolic (HCC)/Pleural effusion, bilateral (2022). Compensated. Not usually on any diuretic in setting of orthostatic hypotension. Has pleural fluid drainage on the right     5. Dementia/Hx of completed stroke. Supportive care      6. Orthostatic hypotension.  -Midodrine on hold due to hypertension     7.  Anemia - likely due to chronic disease. Recent serologies unremarkable.     Code status: DNR  DVT prophylaxis: [facility] Problems  Date Reviewed: 2022          Codes Class Noted POA    Acute encephalopathy ICD-10-CM: G93.40  ICD-9-CM: 348.30  2022 Unknown        Seizure ([facility] ICD-10-CM: R56.9  ICD-9-CM: 780.39  2022 Unknown        Pleural effusion, bilateral ICD-10-CM: J90  ICD-9-CM: 511.9  2022 Unknown Unresponsiveness ICD-10-CM: R41.89  ICD-9-CM: 780.09  4/4/2022 Unknown        CHF (congestive heart failure), NYHA class I, chronic, diastolic (HCC) [facility]-: I50.32  ICD-9-CM: 428.32  Unknown Yes    Overview Signed 2/6/2022  8:35 PM by Darrold Klinefelter, MD     has had to have lungs drained. Hx of completed stroke ICD-10-CM: Z86.73  ICD-9-CM: V12.54  Unknown Yes    Overview Signed 2/6/2022  8:35 PM by Darrold Klinefelter, MD     right sided affected             Orthostatic hypotension ICD-10-CM: I95.1  ICD-9-CM: 458.0  Unknown Yes        Parkinson disease ([facility] ICD-10-CM: G20  ICD-9-CM: 332.0  Unknown Yes        [facility] ICD-10-CM: I48.20  ICD-9-CM: 427.31  Unknown Unknown                Review of Systems:   A comprehensive review of systems was negative except for that written in the HPI. Vital Signs:    Last 24hrs VS reviewed since prior progress note. Most recent are:  Visit Vitals  BP (!) 156/66 (BP 1 Location: Right upper arm, BP Patient Position: At rest)   Pulse 72   Temp 97.6 °F (36.4 °C)   Resp 18   Ht 5' 11\" (1.803 m)   Wt 68.5 kg (151 lb 0.2 oz)   SpO2 98%   BMI 21.06 kg/m²       No intake or output data in the 24 hours ending 04/05/22 8736     Physical Examination:             Constitutional:  No acute distress, cooperative, pleasant    ENT:  Oral mucosa moist, oropharynx benign. Resp:  CTA bilaterally. No wheezing/rhonchi/rales. No accessory muscle use   CV:  Regular rhythm, normal rate, no murmurs, gallops, rubs    GI:  Soft, non distended, non tender. normoactive bowel sounds, no hepatosplenomegaly     Musculoskeletal:  No edema, warm, 2+ pulses throughout    Neurologic:  Moves all extremities. AAOx3, CN II-XII reviewed     Psych:  Good insight, Not anxious nor agitated.        Data Review:    Review and/or order of clinical lab test      Labs:     Recent Labs     04/05/22  0406 04/04/22  0331   WBC 6.5 7.9   HGB 9.3* 10.1*   HCT 31.9* 35.3*    229     Recent Labs     04/05/22  0406 04/04/22  0331    138   K 4.2 4.1   CL 95* 94*   CO2 42* 42*   BUN 26* 25*   CREA 1.02 1.15   GLU 97 101*   CA 9.0 9.3   MG 2.4  --    PHOS 2.7  --      Recent Labs     04/04/22  0331   ALT <6*   AP 65   TBILI 0.9   TP 8.0   ALB 2.7*   GLOB 5.3*     No results for input(s): INR, PTP, APTT, INREXT in the last 72 hours. No results for input(s): FE, TIBC, PSAT, FERR in the last 72 hours. Lab Results   Component Value Date/Time    Folate 12.8 12/21/2021 04:12 AM      Recent Labs     04/04/22 0712   PH 7.38   PCO2 62*   PO2 123*     No results for input(s): CPK, CKNDX, TROIQ in the last 72 hours.     No lab exists for component: CPKMB  No results found for: CHOL, CHOLX, CHLST, CHOLV, HDL, HDLP, LDL, LDLC, DLDLP, TGLX, TRIGL, TRIGP, CHHD, CHHDX  No results found for: [facility]  Lab Results   Component Value Date/Time    Color DARK YELLOW 04/04/2022 04:45 AM    Appearance CLEAR 04/04/2022 04:45 AM    Specific gravity 1.023 04/04/2022 04:45 AM    pH (UA) 6.5 04/04/2022 04:45 AM    Protein 30 (A) 04/04/2022 04:45 AM    Glucose Negative 04/04/2022 04:45 AM    Ketone 15 (A) 04/04/2022 04:45 AM    Urobilinogen 1.0 04/04/2022 04:45 AM    Nitrites Negative 04/04/2022 04:45 AM    Leukocyte Esterase TRACE (A) 04/04/2022 04:45 AM    Epithelial cells FEW 04/04/2022 04:45 AM    Bacteria Negative 04/04/2022 04:45 AM    WBC 0-4 04/04/2022 04:45 AM    RBC 0-5 04/04/2022 04:45 AM         Medications Reviewed:     Current Facility-Administered Medications   Medication Dose Route Frequency    apixaban (ELIQUIS) tablet 5 mg  5 mg Oral BID    carbidopa-levodopa (SINEMET)  mg per tablet 1.5 Tablet  1.5 Tablet Oral QID    digoxin (LANOXIN) tablet 0.25 mg  0.25 mg Oral DAILY    [Held by provider] midodrine (PROAMATINE) tablet 2.5 mg  2.5 mg Oral TID    sodium chloride (NS) flush 5-40 mL  5-40 mL IntraVENous Q8H    sodium chloride (NS) flush 5-40 mL  5-40 mL IntraVENous PRN    acetaminophen (TYLENOL) tablet 650 mg  650 mg Oral Q6H PRN    Or    acetaminophen (TYLENOL) suppository 650 mg  650 mg Rectal Q6H PRN    polyethylene glycol (MIRALAX) packet 17 g  17 g Oral DAILY PRN    ondansetron (ZOFRAN ODT) tablet 4 mg  4 mg Oral Q8H PRN    Or    ondansetron (ZOFRAN) injection 4 mg  4 mg IntraVENous Q6H PRN    hydrALAZINE (APRESOLINE) 20 mg/mL injection 20 mg  20 mg IntraVENous Q6H PRN     ______________________________________________________________________  EXPECTED LENGTH OF STAY: 5d 12h  ACTUAL LENGTH OF STAY:          1                 Dianna Lane MD

## 2022-04-05 NOTE — ED NOTES
Telephone report given to GIANNA Bermudez receiving the patient. Discussed patients care in the ED and plans for palliative care. Discussed patient is a DNR currently- will place transport at this time.

## 2022-04-05 NOTE — PROGRESS NOTES
Nurse walked by the room and noticed patient was on his buttocks on the floor. Pt. Was taken back to bed. Bed alarm did not go off. Vitals are stable, DALILA assessment redone, no bruises or open wounds on patient, MD notified. Will continue to monitor.

## 2022-04-05 NOTE — PROGRESS NOTES
SPEECH THERAPY SCREENING:  SERVICES ARE NOT INDICATED AT THIS TIME    [facility] based on results obtained during the nursing admission assessment. The patients chart was reviewed and the patient is not appropriate for a skilled therapy evaluation at this time. Please consult speech therapy if any therapy needs arise, pending goals of care. Thank you.     Dickson Wise, SLP

## 2022-04-06 NOTE — PROGRESS NOTES
Palliative Medicine      Code Status: DNR    Advance Care Planning:  Advance Care Planning 4/6/2022   Patient's Healthcare Decision Maker is: Legal Next of Kin: Wife Edgar Aguilar   Confirm Advance Directive Not on file   Patient Would Like to Complete Advance Directive Unable at this time   Does the patient have other document types Not on file       Patient / Family Encounter Documentation    Participants (names): Pt, wife Cheo Pena, Palliative Medicine (Dr. Essie De Leon, [facility])    Narrative:  Pt was awake in bed, drowsier and more difficult to understand than during yesterday's visit. Pt had a fall overnight, wife expressed surprise that pt had been able to get himself out of bed, stated pt was unable to state why he had gotten up. Wife spoke of pt's decline over the past several years, spoke of falls at home, seizure-like activity, decreased appetite. Wife is hopeful that pt's appt with new Neurologist next week (4/13) will result in med changes that might help with symptoms or slow disease progression but was receptive to learning more about hospice as a possible option to support pt and wife at home. Pt does not have AMD on file, is currently unable to complete. In absence of assigned Medical POA, wife Cheo Pena is legal NOK and surrogate decision maker. Wife confirmed DNR status, is very interested in completing DDNR. Psychosocial Issues Identified/ Resilience Factors: Caregiver stress, wife shared that son hTad Schmitz and dtr-in-law Kwaku Olivas both work but live close by, are able to offer some degree of hands on assistance. No spiritual concerns identified. Caregiver [place]: Wife has been caring for pt with only limited support, has hired a private caregiver one day a week, which will begin 4/13  Does the caregiver feel confident administering medication? Yes  Does the caregiver need any help connecting with community resources?  Wife is receptive to learning more about hospice  Does the caregiver feel confident assisting with activities of daily living? Yes though is needing more assistance     Goals of Care / Plan: Visit was cut short when pt requested assistance from staff to get up to bedside commode. Wife expressed preference for dtr-in-law to be present for future conversations. Will plan to follow up with pt and family on 4/7 at 3:30pm to continue goals of care conversations and to complete DDNR. Thank you for including Palliative Medicine in the care of Mr. Brenda Cam.      Indio Shine LCSW, [facility]-  [phone])

## 2022-04-06 NOTE — PROGRESS NOTES
1930: RN responded to tech yelling out foe help in hallway. Upon entering the room, pt had had an unwitnessed fall and he was sitting on the floor leaning against the bed. Staff stood patient up and helped back to bed. No visible signs of injury noted and no complaints of pain either. Pt complains of chronic dizziness that he has d/t orthostatic hypotension.  notified. No further orders at this time. Pt's wife notified by this RN. Patient re educated about falls and advised to use call bell for any help needed. Bed zeroed out and bed alarm set to high sensitivity. Will continue to monitor. 0730: Bedside and Verbal shift change report given to GIANNA Pate (oncoming nurse) by Cleo NELSON (offgoing nurse). Report included the following information SBAR, Kardex, Intake/Output, MAR and Recent Results.

## 2022-04-06 NOTE — PROGRESS NOTES
4/6/2022  Case Management Progress Note    12:11 PM  Patient is 81year old male admitted 4/4 with acute encephalopathy  Patient's RUR is 17% yellow/moderate risk for readmission  Covid test: none this admission  Chart reviewed--patient discussed at IDR rounds  Per chart, palliative has met with patient and made some mention of hospice. At this time I don't believe patient has enough support at home for a safe discharge and would be best supported going forward with hospice. Patient is open to [facility], but per their intake, they do not have an aide available. Currently patient is only open to them for nursing, so could consider adding MSW for long term planning if family is not open to switching to hospice yet. Another palliative meeting planned for tomorrow. Will continue to follow and assist with discharge planning as needs become more clear. Transition of Care Plan   1. Continue medical management/treatment  2. Home with family assistance and additional support  3. Transportation tbd pending progress  4.  CM will continue to follow    [facility]

## 2022-04-06 NOTE — PROGRESS NOTES
[address] Adult  Hospitalist Group                                                                                          Hospitalist Progress Note  Rayo Brand MD        Date of Service:  2022  NAME:  Beatrice Sharp  :  1935  MRN:  [id number]      Admission Summary:   72-year-old male presented after possible seizure/syncopal episode    Interval history / Subjective:   Patient drowsy in bed, per RN at bedside he had an episode of upper and lower extremity shaking, loss of bladder control and drooling which lasted about 2 minutes. Assessment & Plan:     1. Unresponsiveness/syncope (2022)  -Likely due to orthostatic hypotension  -EEG unremarkable other than mild generalized slowing seen in encephalopathy  -MRI with generalized parenchymal volume loss and severe microvascular ischemic disease. MRI with encephalomalacia in the left basal ganglia  -suspecting recurrent seizure activity, will re-consult neurology, may consider cEEG? 2.  Chronic atrial fibrillation. Rate is well controlled. Recent ECHO with EF 60%. Continue Eliquis and digoxin          3. Parkinson disease ([facility] Continue Sinemet     4. CHF (congestive heart failure), NYHA class I, chronic, diastolic (HCC)/Pleural effusion, bilateral (2022). Compensated. Not usually on any diuretic in setting of orthostatic hypotension. Has pleural fluid drain on the right     5. Dementia/Hx of completed stroke. Supportive care      6. Orthostatic hypotension.  -Midodrine on hold due to hypertension     7.  Anemia - likely due to chronic disease. Recent serologies unremarkable.     Code status: DNR  DVT prophylaxis: [facility] Problems  Date Reviewed: 2022          Codes Class Noted POA    Acute encephalopathy ICD-10-CM: G93.40  ICD-9-CM: 348.30  2022 Unknown        Seizure ([facility] ICD-10-CM: R56.9  ICD-9-CM: 780.39  2022 Unknown        Pleural effusion, bilateral ICD-10-CM: J90  ICD-9-CM: 511.9 4/4/2022 Unknown        Unresponsiveness ICD-10-CM: R41.89  ICD-9-CM: 780.09  4/4/2022 Unknown        CHF (congestive heart failure), NYHA class I, chronic, diastolic (HCC) NMQ-50-US: I50.32  ICD-9-CM: 428.32  Unknown Yes    Overview Signed 2/6/2022  8:35 PM by Padmini Jones MD     has had to have lungs drained. Hx of completed stroke ICD-10-CM: Z86.73  ICD-9-CM: V12.54  Unknown Yes    Overview Signed 2/6/2022  8:35 PM by Padmini Jones MD     right sided affected             Orthostatic hypotension ICD-10-CM: I95.1  ICD-9-CM: 458.0  Unknown Yes        Parkinson disease ([facility] ICD-10-CM: G20  ICD-9-CM: 332.0  Unknown Yes        [facility] ICD-10-CM: I48.20  ICD-9-CM: 427.31  Unknown Unknown                Review of Systems:   A comprehensive review of systems was negative except for that written in the HPI. Vital Signs:    Last 24hrs VS reviewed since prior progress note. Most recent are:  Visit Vitals  BP (!) 133/52 (BP 1 Location: Right upper arm, BP Patient Position: At rest;Semi fowlers)   Pulse 70   Temp 98.3 °F (36.8 °C)   Resp 16   Ht 5' 11\" (1.803 m)   Wt 70 kg (154 lb 5.2 oz)   SpO2 94%   BMI 21.52 kg/m²         Intake/Output Summary (Last 24 hours) at 4/6/2022 1528  Last data filed at 4/6/2022 1042  Gross per 24 hour   Intake 20 ml   Output 240 ml   Net -220 ml        Physical Examination:             Constitutional:  No acute distress, cooperative, pleasant    ENT:  Oral mucosa moist, oropharynx benign. Resp:  CTA bilaterally. No wheezing/rhonchi/rales. No accessory muscle use   CV:  Regular rhythm, normal rate, no murmurs, gallops, rubs    GI:  Soft, non distended, non tender. normoactive bowel sounds, no hepatosplenomegaly     Musculoskeletal:  No edema, warm, 2+ pulses throughout    Neurologic:  Moves all extremities. AAOx3, CN II-XII reviewed     Psych:  Good insight, Not anxious nor agitated.        Data Review:    Review and/or order of clinical lab test      Labs:     Recent Labs     04/06/22 0235 04/05/22 0406   WBC 6.2 6.5   HGB 9.3* 9.3*   HCT 31.6* 31.9*    208     Recent Labs     04/06/22 0235 04/05/22 0406 04/04/22 0331    138 138   K 3.7 4.2 4.1   CL 94* 95* 94*   CO2 42* 42* 42*   BUN 26* 26* 25*   CREA 1.01 1.02 1.15   * 97 101*   CA 8.7 9.0 9.3   MG  --  2.4  --    PHOS  --  2.7  --      Recent Labs     04/04/22 0331   ALT <6*   AP 65   TBILI 0.9   TP 8.0   ALB 2.7*   GLOB 5.3*     No results for input(s): INR, PTP, APTT, INREXT, INREXT in the last 72 hours. No results for input(s): FE, TIBC, PSAT, FERR in the last 72 hours. Lab Results   Component Value Date/Time    Folate 12.8 12/21/2021 04:12 AM      Recent Labs     04/04/22  0712   PH 7.38   PCO2 62*   PO2 123*     No results for input(s): CPK, CKNDX, TROIQ in the last 72 hours.     No lab exists for component: CPKMB  No results found for: CHOL, CHOLX, CHLST, CHOLV, HDL, HDLP, LDL, LDLC, DLDLP, TGLX, TRIGL, TRIGP, CHHD, CHHDX  No results found for: [facility]  Lab Results   Component Value Date/Time    Color DARK YELLOW 04/04/2022 04:45 AM    Appearance CLEAR 04/04/2022 04:45 AM    Specific gravity 1.023 04/04/2022 04:45 AM    pH (UA) 6.5 04/04/2022 04:45 AM    Protein 30 (A) 04/04/2022 04:45 AM    Glucose Negative 04/04/2022 04:45 AM    Ketone 15 (A) 04/04/2022 04:45 AM    Urobilinogen 1.0 04/04/2022 04:45 AM    Nitrites Negative 04/04/2022 04:45 AM    Leukocyte Esterase TRACE (A) 04/04/2022 04:45 AM    Epithelial cells FEW 04/04/2022 04:45 AM    Bacteria Negative 04/04/2022 04:45 AM    WBC 0-4 04/04/2022 04:45 AM    RBC 0-5 04/04/2022 04:45 AM         Medications Reviewed:     Current Facility-Administered Medications   Medication Dose Route Frequency    apixaban (ELIQUIS) tablet 5 mg  5 mg Oral BID    carbidopa-levodopa (SINEMET)  mg per tablet 1.5 Tablet  1.5 Tablet Oral QID    digoxin (LANOXIN) tablet 0.25 mg  0.25 mg Oral DAILY    [Held by provider] midodrine (PROAMATINE) tablet 2.5 mg  2.5 mg Oral TID    sodium chloride (NS) flush 5-40 mL  5-40 mL IntraVENous Q8H    sodium chloride (NS) flush 5-40 mL  5-40 mL IntraVENous PRN    acetaminophen (TYLENOL) tablet 650 mg  650 mg Oral Q6H PRN    Or    acetaminophen (TYLENOL) suppository 650 mg  650 mg Rectal Q6H PRN    polyethylene glycol (MIRALAX) packet 17 g  17 g Oral DAILY PRN    ondansetron (ZOFRAN ODT) tablet 4 mg  4 mg Oral Q8H PRN    Or    ondansetron (ZOFRAN) injection 4 mg  4 mg IntraVENous Q6H PRN    hydrALAZINE (APRESOLINE) 20 mg/mL injection 20 mg  20 mg IntraVENous Q6H PRN     ______________________________________________________________________  EXPECTED LENGTH OF STAY: 5d 12h  ACTUAL LENGTH OF STAY:          2                 Ozzy Anderson MD

## 2022-04-06 NOTE — PROGRESS NOTES
Physical therapy note    Chart reviewed in preparation for physical therapy evaluation. Spoke with RN. Rn recommending hold of therapy at this time secondary to focal seizure activity this AM with bedside commode transfer. Will hold and continue to follow and re-attempt as appropriate and able. 2nd attempt 16:33: RN continuing to recommend holding therapy this afternoon. Medical team making adjustments to medicines, neurology assessed patient at bedside. Will hold and follow up tomorrow for PT evaluation.     Thank you,  Miguel Rodriguez, PT, DPT

## 2022-04-06 NOTE — CONSULTS
Neurology Progress Note    Patient ID:  Thad Johns  [id number]  81 y.o.  1935    CC: seizure    Subjective:      Patient per wife who witnessed it was being sat and stoop up to go to the commode and then passed out and shook all over for 2 minute with drooling and incontinence. Laid back in bed and woke up after. Labs done today revealed decreased hemoglobin at 9.3, increased CO2 at 42 and elevated PCO2. Current Facility-Administered Medications   Medication Dose Route Frequency    apixaban (ELIQUIS) tablet 5 mg  5 mg Oral BID    carbidopa-levodopa (SINEMET)  mg per tablet 1.5 Tablet  1.5 Tablet Oral QID    digoxin (LANOXIN) tablet 0.25 mg  0.25 mg Oral DAILY    [Held by provider] midodrine (PROAMATINE) tablet 2.5 mg  2.5 mg Oral TID    sodium chloride (NS) flush 5-40 mL  5-40 mL IntraVENous Q8H    sodium chloride (NS) flush 5-40 mL  5-40 mL IntraVENous PRN    acetaminophen (TYLENOL) tablet 650 mg  650 mg Oral Q6H PRN    Or    acetaminophen (TYLENOL) suppository 650 mg  650 mg Rectal Q6H PRN    polyethylene glycol (MIRALAX) packet 17 g  17 g Oral DAILY PRN    ondansetron (ZOFRAN ODT) tablet 4 mg  4 mg Oral Q8H PRN    Or    ondansetron (ZOFRAN) injection 4 mg  4 mg IntraVENous Q6H PRN    hydrALAZINE (APRESOLINE) 20 mg/mL injection 20 mg  20 mg IntraVENous Q6H PRN        Review of Systems:    Pertinent items are noted in HPI.     Objective:     Patient Vitals for the past 8 hrs:   BP Temp Pulse Resp SpO2 Height   04/06/22 1121 (!) 179/74 98.3 °F (36.8 °C) 75 18 96 %    04/06/22 0822      5' 11\" (1.803 m)   04/06/22 0803 (!) 141/61 97.3 °F (36.3 °C) 67 16 94 %    04/06/22 0800   67      04/06/22 0700   74      04/06/22 0406 (!) 150/68 97.4 °F (36.3 °C) 63  98 %        04/06 0701 - 04/06 1900  In: -   Out: 140 [Urine:140]  04/04 1901 - 04/06 0700  In: 20 [P.O.:20]  Out: 100 [Urine:100]    Lab Review   Recent Results (from the past 24 hour(s))   CBC WITH AUTOMATED DIFF Collection Time: 04/06/22  2:35 AM   Result Value Ref Range    WBC 6.2 4.1 - 11.1 K/uL    RBC 3.75 (L) 4.10 - 5.70 M/uL    HGB 9.3 (L) 12.1 - 17.0 g/dL    HCT 31.6 (L) 36.6 - 50.3 %    MCV 84.3 80.0 - 99.0 FL    MCH 24.8 (L) 26.0 - 34.0 PG    MCHC 29.4 (L) 30.0 - 36.5 g/dL    RDW 15.0 (H) 11.5 - 14.5 %    PLATELET 744 901 - 359 K/uL    MPV 9.1 8.9 - 12.9 FL    NRBC 0.0 0  WBC    ABSOLUTE NRBC 0.00 0.00 - 0.01 K/uL    NEUTROPHILS 73 32 - 75 %    LYMPHOCYTES 13 12 - 49 %    MONOCYTES 12 5 - 13 %    EOSINOPHILS 1 0 - 7 %    BASOPHILS 0 0 - 1 %    IMMATURE GRANULOCYTES 1 (H) 0.0 - 0.5 %    ABS. NEUTROPHILS 4.6 1.8 - 8.0 K/UL    ABS. LYMPHOCYTES 0.8 0.8 - 3.5 K/UL    ABS. MONOCYTES 0.7 0.0 - 1.0 K/UL    ABS. EOSINOPHILS 0.1 0.0 - 0.4 K/UL    ABS. BASOPHILS 0.0 0.0 - 0.1 K/UL    ABS. IMM. GRANS. 0.0 0.00 - 0.04 K/UL    DF AUTOMATED     METABOLIC PANEL, BASIC    Collection Time: 04/06/22  2:35 AM   Result Value Ref Range    Sodium 140 136 - 145 mmol/L    Potassium 3.7 3.5 - 5.1 mmol/L    Chloride 94 (L) 97 - 108 mmol/L    CO2 42 (HH) 21 - 32 mmol/L    Anion gap 4 (L) 5 - 15 mmol/L    Glucose 105 (H) 65 - 100 mg/dL    BUN 26 (H) 6 - 20 MG/DL    Creatinine 1.01 0.70 - 1.30 MG/DL    BUN/Creatinine ratio 26 (H) 12 - 20      GFR est AA >60 >60 ml/min/1.73m2    GFR est non-AA >60 >60 ml/min/1.73m2    Calcium 8.7 8.5 - 10.1 MG/DL     PHYSICAL EXAM:     NEUROLOGICAL EXAM:     Appearance: The patient is thin, does not provide a coherent history and is in no acute distress. Mental Status: Asleep. Cranial Nerves:   II - XII grossly intact. Motor:  No focal weakness. Cogwheel rigidity LUE. No pronator drift. Reflexes:   Deep tendon reflexes were symmetrical.    Sensory:   Intact to noxious. Gait:  Not tested. Tremor:   Periodic limb movements seen while asleep. Cerebellar:  Not tested.          Assessment:   Convulsive syncope  Orthostatic hypotension  Periodic limb movements with sleep  Parkinson's disease  History of CVA    Plan:   No change in his neurological examination. Patient noted while asleep to have periodic limb movements. Per wife this is chronic. Trial of clonazepam 0.5 mg at bedtime for PLMS. Event is more consistent with syncope followed by convulsion likely secondary to known orthostatic hypotension. Recommend checking for orthostasis. I am not inclined to start patient on AED unless seizures occur while at rest.    Movement disorders clinic at [facility] were considering possible multisystem atrophy with parkinsonism given significant orthostatic hypotension. Patient was on midodrine. Patient is set to see a new movement disorder specialist next week and likely will be a candidate for Northera. Unfortunately Sinemet can also worsen orthostatic hypotension. Continue home dosage of Sinemet 25/100, 1.5 tablets 4 times a day. No indication to change again given risk of worsening orthostatic hypotension. May need cardiology evaluation given history of atrial fibrillation in the setting as well of having issues with orthostatic hypotension. Patient is currently on midodrine.     Continue Eliquis given his history of prior stroke. EEG done during this admission did not reveal any seizures. Mild generalized slowing typically seen in encephalopathies. No indication to start any AED.     Discussed with impression and findings with patient's wife who is the primary caregiver. She is receptive for alternative care aside from home care. Patient may benefit from SNF. 35 minutes was spent with this patient.   This included review of his medical records, evaluation of the patient, formulation of treatment plan, discussing findings and treatment with his wife and primary team.    Signed:  Fito Fuentes., MD  4/6/2022  11:59 AM

## 2022-04-06 NOTE — PROGRESS NOTES
[facility]     Name: Nehemias Cortez MRN: [id number]   : 1935 Hospital: [facility]   Date: 2022        Impression Plan   1. Acute on chronic respiratory failure  2. Acute on chronic CO2 retention  3. Failure to thrive  4. Encephalopathy  5. Recurrent pleural effusions                 · Patient is severly debilitated with chronic CO2 retention, persistent pleural effusions and multiple other medical problems. Discussed with wife at bedside about the lack of long term and even lack of short term gains by performing a thoracentesis on the left. The patient has gone down hill in the last 6 months and very rapidly in the last week despite right sided pleurex drainage. Wife is agreeable to talk to palliative about end of life plans. · Neurology following  · Wean O2 as tolerated, keeping sats above 90%  · Palliative consult pending           Radiology  (personally reviewed) CXR reviewed: essentially unchanged pleural effusions compared to CXR one month ago. Subjective     Cc: pleural effusions    80 yo with PMHx of atrial fibrillation and diastolic heart failure presenting with encephalopathy, lethargy and hypoxia. Pulmonary was consulted for bilateral pleural effusions. Pt has had multiple thoracentesis done bilaterally since 2022 and had a tunneled pleural catheter placed on the right in the last month. Wife has difficulties remembering the amounts removed on once weekly drainage, but it sounds like smaller amounts (245-300cc). Pt has acute on chronic CO2 retention on ABG and only responds to noxious stimuli. Wife states that pt has eaten very little in the last few weeks and overall decline in the last 6 months. Pt is a 100% on 3 L. Interval history  Afebrile  HR stable  BP stable  Sats 94% on 3L  Bicarb 42; ABG compensated  Creat 1.01 - better  TSH 2.63    Had unwitnessed fall last evening - found sitting on the floor leaning against the bed.   No visible signs of injury or complaints of pain. Head CT this morning pending. Review of Systems:  Review of systems not obtained due to patient factors. He is awake and more alert this morning. Wife reports concerns with confusion and hallucinations. States he seems to be seeing and reaching for things that aren't there. Past Medical History:   Diagnosis Date    Anemia     [facility] [address] [address]     CHF (congestive heart failure), NYHA class I, chronic, diastolic (HCC)     has had to have lungs drained.  CKD (chronic kidney disease), stage III ([address]     Hx of completed stroke     right sided affected    Hypoalbuminemia     Orthostatic hypotension     Parkinson disease (HCC)     Proteinuria       Past Surgical History:   Procedure Laterality Date    HX CHOLECYSTECTOMY      HX KNEE ARTHROSCOPY Left       Prior to Admission medications    Medication Sig Start Date End Date Taking? Authorizing Provider   furosemide (LASIX) 40 mg tablet Take 40 mg by mouth daily. Yes Provider, Historical   digoxin (LANOXIN) 0.25 mg tablet Take 0.25 mg by mouth daily. Yes Other, MD Parish   acetaminophen (Tylenol Extra Strength) 500 mg tablet Take 1,000 mg by mouth two (2) times a day. Yes Provider, Historical   apixaban (Eliquis) 5 mg tablet Take 5 mg by mouth two (2) times a day. Yes Other, MD Parish   midodrine (PROAMATINE) 2.5 mg tablet Take 2.5 mg by mouth three (3) times daily. Yes Cedric, MD Parish   carbidopa-levodopa (SINEMET)  mg per tablet Take 1.5 Tablets by mouth four (4) times daily.    Yes Cedric, MD Parish     Current Facility-Administered Medications   Medication Dose Route Frequency    apixaban (ELIQUIS) tablet 5 mg  5 mg Oral BID    carbidopa-levodopa (SINEMET)  mg per tablet 1.5 Tablet  1.5 Tablet Oral QID    digoxin (LANOXIN) tablet 0.25 mg  0.25 mg Oral DAILY    [Held by provider] midodrine (PROAMATINE) tablet 2.5 mg  2.5 mg Oral TID    sodium chloride (NS) flush 5-40 mL  5-40 mL IntraVENous Q8H     No Known Allergies   Social History     Tobacco Use    Smoking status: Not on file    Smokeless tobacco: Not on file   Substance Use Topics    Alcohol use: Not on file      No family history on file. Laboratory: I have personally reviewed the flowsheet and labs. Recent Labs     04/06/22 0235 04/05/22 0406 04/04/22  0331   WBC 6.2 6.5 7.9   HGB 9.3* 9.3* 10.1*   HCT 31.6* 31.9* 35.3*    208 229     Recent Labs     04/06/22 0235 04/05/22  0406 04/04/22  0331    138 138   K 3.7 4.2 4.1   CL 94* 95* 94*   CO2 42* 42* 42*   * 97 101*   BUN 26* 26* 25*   CREA 1.01 1.02 1.15   CA 8.7 9.0 9.3   MG  --  2.4  --    PHOS  --  2.7  --    ALB  --   --  2.7*   ALT  --   --  <6*       Objective:     Visit Vitals  BP (!) 141/61 (BP 1 Location: Right upper arm, BP Patient Position: At rest;Semi fowlers)   Pulse 67   Temp 97.3 °F (36.3 °C)   Resp 16   Ht 5' 11\" (1.803 m)   Wt 70 kg (154 lb 5.2 oz)   SpO2 94%   BMI 21.52 kg/m²         Intake/Output Summary (Last 24 hours) at 4/6/2022 2034  Last data filed at 4/6/2022 0315  Gross per 24 hour   Intake 20 ml   Output 100 ml   Net -80 ml     EXAM:   GENERAL: elderly, debilitated, awake, responsive, HEENT:  anicteric, EOMI, no alar flaring or epistaxis, oral mucosa moist without cyanosis, NECK:  no jugular vein distention, no retractions, no thyromegaly or masses, LUNGS: Decreased breath sounds in bases bilaterally, HEART:  Regular rate and rhythm with no MGR; trace edema is present, ABDOMEN:  soft with no tenderness, bowel sounds present, EXTREMITIES:  warm with no cyanosis, SKIN:  no jaundice or ecchymosis and NEUROLOGIC:  Awake, appears comfortable, more responsive, speech somewhat garbled, but generally intelligible.     Nemesio Das, [address]  [facility] [place]

## 2022-04-06 NOTE — PROGRESS NOTES
[address] [facility] witnessed/unwitnessed fall occurred on 4/5/2022 (Date) at [address] (Time). The answers to the following questions summarize the fall: In the patient's own words,:  · What were you attempting to do when you fell? Unable to assess at this time  · Do you know why you fell? Unable to assess at this time  · Do you have any pain/discomfort or any other complaints? No  · Which part of your body made contact with the floor or other object? Unable to assess at this time. Nurse:  [facility] Was this an assisted fall? no   Was fall witnessed? No   If witnessed, what part of the body made contact with the floor or other object? N/A   Patients mental status after the fall/when found: Confused   Any apparent injury:  No apparent injury   Immediate interventions for injury/suspected injury? No interventions needed   Patient assisted back to bed? Assist X2   Name of provider notified and time, any comments? Dr. Leola Solis notified by Prosper Ferrell RN. No comments at this time.  Name of family member notified and time: Sarah Cordova 2000      Immediate VS and physical assessment documented in flow sheets. Neuro assessment every hour x 4 (for potential head injury or unwitnessed fall) documented in flow sheets.       Darin Felipe RN

## 2022-04-06 NOTE — PROGRESS NOTES
Comprehensive Nutrition Assessment    Type and Reason for Visit: Initial,Positive nutrition screen    Nutrition Recommendations/Plan:   1. Continue regular diet, or per SLP  2. Daily weights   3. Consider SLP reconsult - patient with significant coughing and noted pocketing during breakfast today  4. Provide Magic Cup once daily to increase kcal/protein intake (290 kcal, 38 g carbs, 9 g protein)    Nutrition Assessment:    Pt is an 81year old male admitted with  Acute encephalopathy [G93.40]. He  has a past medical history of Anemia, Atrial fibrillation ([address] CHF (congestive heart failure), NYHA class I, chronic, diastolic ([address] CKD (chronic kidney disease), stage III ([address] completed stroke, Hypoalbuminemia, Orthostatic hypotension, Parkinson disease ([address] and Proteinuria. BPA for recent wt loss 2 - 13# and poor intake. Per documentation, patient has lost 9# (5.4%) within 5 months - not clinically significant for timeframe. All information obtained from wife at bedside. Per wife, # and he weighed this amount two weeks ago - this would be a 14# (8.3%) x 2 week loss, clinically significant for timeframe. No documented meal intake. Patient noted to have coughing/spitting up and pocketing during breakfast today. Wife denies chewing/swallowing problems. Endorses decreased appetite x \"several days\". Has been ordering meals for . NK[employer]. States that they have tried multiple supplements in the past and he has disliked all Ensures/Boosts tried. Voiced acceptance of [employer]. Currently on 5 L O2.      Wt Readings from Last 10 Encounters:   04/05/22 70 kg (154 lb 5.2 oz)   02/11/22 68.5 kg (151 lb)   12/23/21 74 kg (163 lb 2.3 oz)       Malnutrition Assessment:  Malnutrition Status:  Severe malnutrition    Context:  Chronic illness     Findings of the 6 clinical characteristics of malnutrition:   Energy Intake:  7 - 75% or less est energy requirements for 1 month or longer  Weight Loss:  Mild weight loss (specify amount and time period) (per wife, 14# x 2 weeks)     Body Fat Loss:  7 - Severe body fat loss, Fat overlying ribs,Buccal region   Muscle Mass Loss:  7 - Severe muscle mass loss, Thigh (quadriceps),Clavicles (pectoralis &deltoids),Temples (temporalis)  Fluid Accumulation:  No significant fluid accumulation,     Strength:  Not performed     Estimated Daily Nutrient Needs:  Energy (kcal): 1829 (MSJ x 1.3); Weight Used for Energy Requirements: Current  Protein (g): 70-84 (1.0-1.2); Weight Used for Protein Requirements: Current  Fluid (ml/day): 3948; Method Used for Fluid Requirements: 1 ml/kcal    Nutrition Related Findings:       ABD: WDL  Last BM: PTA  Edema:LLE: 1+ (4/5/2022  8:32 PM)  RLE: 1+ (4/5/2022  8:32 PM)      Nutr. Labs:    Lab Results   Component Value Date/Time    GFR est AA >60 04/06/2022 02:35 AM    GFR est non-AA >60 04/06/2022 02:35 AM    Creatinine 1.01 04/06/2022 02:35 AM    BUN 26 (H) 04/06/2022 02:35 AM    Sodium 140 04/06/2022 02:35 AM    Potassium 3.7 04/06/2022 02:35 AM    Chloride 94 (L) 04/06/2022 02:35 AM    CO2 42 (HH) 04/06/2022 02:35 AM       Lab Results   Component Value Date/Time    Glucose 105 (H) 04/06/2022 02:35 AM       Lab Results   Component Value Date/Time    Hemoglobin A1c 6.0 (H) 12/21/2021 04:12 AM       Nutr. Meds:  Eliquis, sinemet, zofran PRN, miralax PRN    Wounds:    Skin tears,Surgical incision       Current Nutrition Therapies:  ADULT DIET Regular    Anthropometric Measures:  · Height:  5' 11\" (180.3 cm)  · Current Body Wt:  70 kg (154 lb 5.2 oz)   · Admission Body Wt:  154 lb 5.2 oz    · Usual Body Wt:  72.6 kg (160 lb)     · Ideal Body Wt:  172 lbs:  89.7 %   Body mass index is 21.52 kg/m².   · BMI Category:  Underweight (BMI less than 22) age over 72       Nutrition Diagnosis:   · Severe malnutrition related to inadequate protein-energy intake as evidenced by intake 0-25%,weight loss,moderate loss of subcutaneous fat,moderate muscle loss    Nutrition Interventions:   Food and/or Nutrient Delivery: Continue current diet,Start oral nutrition supplement  Nutrition Education and Counseling: No recommendations at this time  Coordination of Nutrition Care: Continue to monitor while inpatient,Interdisciplinary rounds    Goals:  PO intake >/= 50% estimated protein needs within 1 - 3 days       Nutrition Monitoring and Evaluation:   Behavioral-Environmental Outcomes: None identified  Food/Nutrient Intake Outcomes: Food and nutrient intake,Supplement intake  Physical Signs/Symptoms Outcomes: Biochemical data,Skin,Weight    Discharge Planning:     Too soon to determine     Electronically signed by Compa Quiñonez RD on 9/1/9143  Contact: [phone] or via [employer]

## 2022-04-06 NOTE — PROGRESS NOTES
Primary Nurse and Sitter witnessed seizure activity from patient, jerking from trunk and all extremeities, unresponsive and drooling for 1 minute. Supported with seizure precautions, 02 and suction. Dr. Merly Gentile on unit and notified.

## 2022-04-06 NOTE — ACP (ADVANCE CARE PLANNING)
Advance Care Planning:  Advance Care Planning 4/6/2022   Patient's Healthcare Decision Maker is: Legal Next of Kin: Wife Porsha Graf   Confirm Advance Directive Not on file   Patient Would Like to Complete Advance Directive Unable at this time   Does the patient have other document types Not on file     Pt does not have AMD on file, is currently unable to complete. In absence of assigned Medical POA, wife Kassidy Bates is legal NOK and surrogate decision maker. Wife confirmed DNR status, is very interested in completing DDNR. Visit was interrupted today before document could be signed, will plan to complete DDNR during 4/7 family meeting.

## 2022-04-07 NOTE — PROGRESS NOTES
[facility]     Name: Laura Baldwin MRN: [id number]   : 1935 Hospital: [address] [facility]   Date: 2022        Impression Plan   1. Acute on chronic respiratory failure  2. Acute on chronic CO2 retention  3. Failure to thrive  4. Encephalopathy  5. Recurrent pleural effusions                 · Patient is severly debilitated with chronic CO2 retention, persistent pleural effusions and multiple other medical problems. Discussed with wife at bedside about the lack of long term and even lack of short term gains by performing a thoracentesis on the left. The patient has gone down hill in the last 6 months and very rapidly in the last week despite right sided pleurex drainage. · Neurology following  · Wean O2 as tolerated, keeping sats above 90%  · Palliative has evaluated. Wife hopeful for recovery, but realistic about patient's condition and willing to further discuss hospice with daughter-in-law present. Radiology  (personally reviewed) CXR reviewed: essentially unchanged pleural effusions compared to CXR one month ago. Subjective     Cc: pleural effusions    80 yo with PMHx of atrial fibrillation and diastolic heart failure presenting with encephalopathy, lethargy and hypoxia. Pulmonary was consulted for bilateral pleural effusions. Pt has had multiple thoracentesis done bilaterally since 2022 and had a tunneled pleural catheter placed on the right in the last month. Wife has difficulties remembering the amounts removed on once weekly drainage, but it sounds like smaller amounts (245-300cc). Pt has acute on chronic CO2 retention on ABG and only responds to noxious stimuli. Wife states that pt has eaten very little in the last few weeks and overall decline in the last 6 months. Pt is a 100% on 3 L.      Interval history  Afebrile  HR stable  BP stable  Sats 97% on 2L  Bicarb 39 - better; ABG compensated  Creat 0.98  TSH 2.63    Yesterday morning had syncopal episode with convulsions vs seizure activity - neurology favoring convulsive syncope. Review of Systems:  Review of systems not obtained due to patient factors. He is awake and alert this morning. Wife discouraged about his convulsive episodes. Past Medical History:   Diagnosis Date    Anemia     Atrial fibrillation ([facility] [address]     CHF (congestive heart failure), NYHA class I, chronic, diastolic (HCC)     has had to have lungs drained.  CKD (chronic kidney disease), stage III ([address]     Hx of completed stroke     right sided affected    Hypoalbuminemia     Orthostatic hypotension     Parkinson disease (HCC)     Proteinuria       Past Surgical History:   Procedure Laterality Date    HX CHOLECYSTECTOMY      HX KNEE ARTHROSCOPY Left       Prior to Admission medications    Medication Sig Start Date End Date Taking? Authorizing Provider   furosemide (LASIX) 40 mg tablet Take 40 mg by mouth daily. Yes Provider, Historical   digoxin (LANOXIN) 0.25 mg tablet Take 0.25 mg by mouth daily. Yes Other, MD Parish   acetaminophen (Tylenol Extra Strength) 500 mg tablet Take 1,000 mg by mouth two (2) times a day. Yes Provider, Historical   apixaban (Eliquis) 5 mg tablet Take 5 mg by mouth two (2) times a day. Yes Other, MD sathya Lugoodrine (PROAMATINE) 2.5 mg tablet Take 2.5 mg by mouth three (3) times daily. Yes Cedric, MD Parish   carbidopa-levodopa (SINEMET)  mg per tablet Take 1.5 Tablets by mouth four (4) times daily.    Yes Cedric, MD Parish     Current Facility-Administered Medications   Medication Dose Route Frequency    clonazePAM (KlonoPIN) tablet 0.5 mg  0.5 mg Oral QHS    apixaban (ELIQUIS) tablet 5 mg  5 mg Oral BID    carbidopa-levodopa (SINEMET)  mg per tablet 1.5 Tablet  1.5 Tablet Oral QID    digoxin (LANOXIN) tablet 0.25 mg  0.25 mg Oral DAILY    [Held by provider] midodrine (PROAMATINE) tablet 2.5 mg  2.5 mg Oral TID    sodium chloride (NS) flush 5-40 mL  5-40 mL IntraVENous Q8H     No Known Allergies   Social History     Tobacco Use    Smoking status: Not on file    Smokeless tobacco: Not on file   Substance Use Topics    Alcohol use: Not on file      No family history on file. Laboratory: I have personally reviewed the flowsheet and labs. Recent Labs     04/07/22 0234 04/06/22  0235 04/05/22  0406   WBC 5.4 6.2 6.5   HGB 9.0* 9.3* 9.3*   HCT 30.5* 31.6* 31.9*    230 208     Recent Labs     04/07/22  0234 04/06/22  0235 04/05/22  0406    140 138   K 4.0 3.7 4.2   CL 97 94* 95*   CO2 39* 42* 42*   * 105* 97   BUN 23* 26* 26*   CREA 0.98 1.01 1.02   CA 8.7 8.7 9.0   MG  --   --  2.4   PHOS  --   --  2.7       Objective:     Visit Vitals  BP (!) 142/60 (BP 1 Location: Right upper arm, BP Patient Position: At rest)   Pulse 68   Temp 98.6 °F (37 °C)   Resp 22   Ht 5' 11\" (1.803 m)   Wt 70.6 kg (155 lb 10.3 oz)   SpO2 97%   BMI 21.71 kg/m²         Intake/Output Summary (Last 24 hours) at 4/7/2022 1056  Last data filed at 4/7/2022 0316  Gross per 24 hour   Intake 20 ml   Output 0 ml   Net 20 ml     EXAM:   GENERAL: elderly, debilitated, awake, responsive, HEENT:  anicteric, EOMI, no alar flaring or epistaxis, oral mucosa moist without cyanosis, NECK:  no jugular vein distention, no retractions, no thyromegaly or masses, LUNGS: Decreased breath sounds in bases bilaterally, HEART:  Regular rate and rhythm with no MGR; trace edema is present, ABDOMEN:  soft with no tenderness, bowel sounds present, EXTREMITIES:  warm with no cyanosis, SKIN:  no jaundice or ecchymosis and NEUROLOGIC:  Awake, appears comfortable, more responsive, speech garbled.     Violet [facility] [place]  [facility] [place]

## 2022-04-07 NOTE — PROGRESS NOTES
Palliative Medicine Consult  Raffaele: [phone] ([phone]    Patient Name: Sherri Multani  YOB: 1935    Date of Initial Consult: 4/5/22  Reason for Consult: Care decisions   Requesting Provider: Giovana Tijerina   Primary Care Physician: Aurea Harris MD     SUMMARY:   Sherri Multani is a 81 y.o. with a past history of a fib on eliquis, diastolic CHF w/ hx of pleural effusions w/ pleural drain on R, CVA, Parkinson's disease, dementia, orthostatic hypotension who was admitted on 4/4/2022 from home after wife found him unresponsive and shaking. Pulm eval- has acute on chronic respiratory failure- mult thoracnteses required b/l since 12/2021 and R pleurx drain placed last month. Neuro eval- EEG w/ generalized slowing. MRI brain w/out acute findings. Did fall out of bed last night, CT head stable. On 4/6 had episode after standing up to get to commode- shaking, unresponsive. Current medical issues leading to Palliative Medicine involvement include: care decisions. Social: Pt  to supportive wife Aubrey Cifuentes. They have one child- son Reji Yoon who is local (and his wife Mecca Sosa). She has caretakers to help at home. PALLIATIVE DIAGNOSES:   1. Parkinson's disease   2. Cognitive deficits - mental status waxes/wanes  3. Shortness of breath - with recurrent pleural effusions   4. Poor appetite  5. Increased falls        PLAN:   1. Along w/ Laquita LCSW met w/ wife Aubrey Cifuentes and her dtr in law Mecca Sosa. 2. Yesterday we were talking about neurology follow up, then possible hospice in the future. However since that time pt not only fell out of bed yest AM but after we met, had another episode of unresponsiveness. Uncertain safe to even get into wheelchair anymore and now family thinking more about hospice right now. 3. Hospice philosophy discussed in detail, family in agreement about gentle care and treating sx. Would not do more tests like scans or EEGs but would treat sx as they arise.  Talk about stopping medications as he tolerates them less (bring up Eliquis) and about starting others if needed (if has more syncopal or even sz activity). 4. Discuss what hospice would look like in the home or in a facility if needed. Hospice consulted. 5. DDNR signed. 6. Communicated plan of care with: Palliative AUTUMNKellymary ann [address] [place] [phone] [facility] care management      GOALS OF CARE / TREATMENT PREFERENCES:     GOALS OF CARE:  Patient/Health Care Proxy Stated Goals: Rehabilitation    TREATMENT PREFERENCES:   Code Status: DNR    Patient and family's personal goals include: to live as well as he can       Advance Care Planning:  [x] [facility] Interdisciplinary Team has updated the ACP Navigator with Health Care Decision Maker and Patient Capacity      Primary Decision Maker: Jose Miguel Steeleramakrishna - Spouse - [phone]    Secondary Decision Maker: Erik Arcos - [phone]  Advance Care Planning 4/6/2022   Patient's Healthcare Decision Maker is: Legal Next of [facility] 296 Directive -   Patient Would Like to Complete Advance Directive -   Does the patient have other document types -       Medical Interventions: Limited additional interventions       Other:    As far as possible, the palliative care team has discussed with patient / health care proxy about goals of care / treatment preferences for patient. HISTORY:     History obtained from: chart, family, pt, staff     CHIEF COMPLAINT: Cannot obtain due to patient factors      HPI/SUBJECTIVE:    The patient is:   [x] Verbal and participatory  [] Non-participatory due to:     Pt in bed, less interactive.      Clinical Pain Assessment (nonverbal scale for severity on nonverbal patients):   Clinical Pain Assessment  Severity: 0          Duration: for how long has pt been experiencing pain (e.g., 2 days, 1 month, years)  Frequency: how often pain is an issue (e.g., several times per day, once every few days, constant)     FUNCTIONAL ASSESSMENT: Palliative Performance Scale (PPS):  PPS: 50       PSYCHOSOCIAL/SPIRITUAL SCREENING:     Palliative IDT has assessed this patient for cultural preferences / practices and a referral made as appropriate to needs (Cultural Services, Patient Advocacy, Ethics, etc.)    Any spiritual / Taoist concerns:  [] Yes /  [x] No   If \"Yes\" to discuss with pastoral care during IDT     Does caregiver feel burdened by caring for their loved one:   [] Yes /  [x] No /  [] No Caregiver Present    If \"Yes\" to discuss with social work during IDT    Anticipatory grief assessment:   [x] Normal  / [] Maladaptive     If \"Maladaptive\" to discuss with social work during IDT    ESAS Anxiety:      ESAS Depression:       Cannot obtain due to patient factors     REVIEW OF SYSTEMS:     Positive and pertinent negative findings in ROS are noted above in HPI. The following systems were [x] reviewed / [] unable to be reviewed as noted in HPI  Other findings are noted below. Systems: constitutional, ears/nose/mouth/throat, respiratory, gastrointestinal, genitourinary, musculoskeletal, integumentary, neurologic, psychiatric, endocrine. Positive findings noted below. Modified ESAS Completed by: provider   Fatigue: 8 Drowsiness: 4     Pain: 0           Dyspnea: 0           Stool Occurrence(s): 0        PHYSICAL EXAM:     From RN flowsheet:  Wt Readings from Last 3 Encounters:   04/07/22 155 lb 10.3 oz (70.6 kg)   02/11/22 151 lb (68.5 kg)   12/23/21 163 lb 2.3 oz (74 kg)     Blood pressure (!) 131/58, pulse 66, temperature 97.5 °F (36.4 °C), resp. rate 22, height 5' 11\" (1.803 m), weight 155 lb 10.3 oz (70.6 kg), SpO2 94 %.     Pain Scale 1: Numeric (0 - 10)  Pain Intensity 1: 0                   Constitutional: awake, alert, lethargic  Eyes: pupils equal, anicteric  ENMT: no nasal discharge, moist mucous membranes  Respiratory: breathing not labored on NC  Musculoskeletal: no deformity, no tenderness to palpation  Skin: warm, dry  Neurologic: moving all extremities, +tremor        HISTORY:     Active Problems:    CHF (congestive heart failure), NYHA class I, chronic, diastolic (HCC) ()      Overview: has had to have lungs drained. Hx of completed stroke ()      Overview: right sided affected      Orthostatic hypotension ()      Parkinson disease (HCC) ()      Chronic a-fib (HCC) ()      Acute encephalopathy (4/4/2022)      Seizure ([facility] [address] (4/4/2022)      Pleural effusion, bilateral (4/4/2022)      Unresponsiveness (4/4/2022)      Past Medical History:   Diagnosis Date    Anemia     Atrial fibrillation (HCC)     CHF (congestive heart failure), NYHA class I, chronic, diastolic (HCC)     has had to have lungs drained.  CKD (chronic kidney disease), stage III ([facility] [address]     Hx of completed stroke     right sided affected    Hypoalbuminemia     Orthostatic hypotension     Parkinson disease (HCC)     Proteinuria       Past Surgical History:   Procedure Laterality Date    HX CHOLECYSTECTOMY      HX KNEE ARTHROSCOPY Left       No family history on file. History reviewed, no pertinent family history.   Social History     Tobacco Use    Smoking status: Not on file    Smokeless tobacco: Not on file   Substance Use Topics    Alcohol use: Not on file     No Known Allergies   Current Facility-Administered Medications   Medication Dose Route Frequency    clonazePAM (KlonoPIN) tablet 0.5 mg  0.5 mg Oral QHS    apixaban (ELIQUIS) tablet 5 mg  5 mg Oral BID    carbidopa-levodopa (SINEMET)  mg per tablet 1.5 Tablet  1.5 Tablet Oral QID    digoxin (LANOXIN) tablet 0.25 mg  0.25 mg Oral DAILY    [Held by provider] midodrine (PROAMATINE) tablet 2.5 mg  2.5 mg Oral TID    sodium chloride (NS) flush 5-40 mL  5-40 mL IntraVENous Q8H    sodium chloride (NS) flush 5-40 mL  5-40 mL IntraVENous PRN    acetaminophen (TYLENOL) tablet 650 mg  650 mg Oral Q6H PRN    Or    acetaminophen (TYLENOL) suppository 650 mg  650 mg Rectal Q6H PRN    polyethylene glycol (MIRALAX) packet 17 g  17 g Oral DAILY PRN    ondansetron (ZOFRAN ODT) tablet 4 mg  4 mg Oral Q8H PRN    Or    ondansetron (ZOFRAN) injection 4 mg  4 mg IntraVENous Q6H PRN    hydrALAZINE (APRESOLINE) 20 mg/mL injection 20 mg  20 mg IntraVENous Q6H PRN          LAB AND IMAGING FINDINGS:     Lab Results   Component Value Date/Time    WBC 5.4 04/07/2022 02:34 AM    HGB 9.0 (L) 04/07/2022 02:34 AM    PLATELET 641 23/53/4132 02:34 AM     Lab Results   Component Value Date/Time    Sodium 137 04/07/2022 02:34 AM    Potassium 4.0 04/07/2022 02:34 AM    Chloride 97 04/07/2022 02:34 AM    CO2 39 (H) 04/07/2022 02:34 AM    BUN 23 (H) 04/07/2022 02:34 AM    Creatinine 0.98 04/07/2022 02:34 AM    Calcium 8.7 04/07/2022 02:34 AM    Magnesium 2.4 04/05/2022 04:06 AM    Phosphorus 2.7 04/05/2022 04:06 AM      Lab Results   Component Value Date/Time    Alk. phosphatase 65 04/04/2022 03:31 AM    Protein, total 8.0 04/04/2022 03:31 AM    Albumin 2.7 (L) 04/04/2022 03:31 AM    Globulin 5.3 (H) 04/04/2022 03:31 AM     Lab Results   Component Value Date/Time    INR 1.1 02/06/2022 08:47 PM    Prothrombin time 11.8 (H) 02/06/2022 08:47 PM    aPTT 32.7 (H) 02/06/2022 08:47 PM      Lab Results   Component Value Date/Time    Iron 23 (L) 12/21/2021 04:12 AM    TIBC 207 (L) 12/21/2021 04:12 AM    Iron % saturation 11 (L) 12/21/2021 04:12 AM    Ferritin 340 02/06/2022 08:47 PM      Lab Results   Component Value Date/Time    pH 7.38 04/04/2022 07:12 AM    PCO2 62 (H) 04/04/2022 07:12 AM    PO2 123 (H) 04/04/2022 07:12 AM     No components found for: GLPOC   No results found for: CPK, CKMB             Total time:35  min   Counseling / coordination time, spent as noted above: 25 min   > 50% counseling / coordination?: yes    Prolonged service was provided for  []30 min   []75 min in face to face time in the presence of the patient, spent as noted above.   Time Start:   Time End:   Note: this can only be billed with 44011 (initial) or 53122 (follow up). If multiple start / stop times, list each separately.

## 2022-04-07 NOTE — PROGRESS NOTES
Physician Progress Note      PATIENTFredick Jessica  [facility]                  [id number]  :                       1935  ADMIT DATE:       2022 3:09 AM  DISCH DATE:  RESPONDING  PROVIDER #:        Rose Washburn MD [facility] MD          QUERY TEXT:    Good morning  Pt. admitted with unresponsiveness. Pt. noted to have orthostatic hypotension and Parkinson's disease. If possible, please document in progress notes and discharge summary the relationship, if any, between Parkinsons disease  and unresponsiveness. [[Orthostatic hypotension due to Parkinson's Disease::This patient has orthostatic hypotension due to Parkinson's Disease  [[Orthostatic hypotension unrelated to Parkinson's Disease::This patient has Orthostatic hypotension unrelated to Parkinson's Disease    The medical record reflects the following:  Risk Factors: Parkinson's Disease, orthostatic hypotension, unresponsiveness  Clinical Indicators: multiple falls, syncopal episodes, being followed by  movement disorders clinic  Treatment: neurology consult, PO sinemet, midodrine, fall precautions, MRI, CT of head, BP monitoring    Thank you  Zi Garza RN CDI  [phone]  Options provided:  -- Respond - Create new note now  -- Disagree - Not applicable / Not valid  -- Disagree - Clinically unable to determine / Unknown  -- Refer to Clinical Documentation Reviewer    PROVIDER RESPONSE TEXT:    Provider disagreed with this query. Query created by: Cintia Norman on 2022 10:31 AM      QUERY TEXT:    Good morning  Patient admitted with unresponsiveness. Noted documentation of encephalopathy. In order to support the diagnosis of encephalopathy, please include additional clinical indicators in your documentation. Or please document if the diagnosis of encephalopathy has been ruled out after further study.     The medical record reflects the following:  Risk Factors: Parkinson's, Dementia, encephalopathy Debility, CKD  Clinical Indicators: EEG- mild generalized slowing seen in encephalopathy, MRI with generalized parenchymal volume loss and severe microvascular ischemic disease,  encephalomalacia in the left basal ganglia, white matter disease. Documented alert and responsive on 04/05 and then documented  unwitnessed fall and noted to be confused when found  Treatment: MRI, CT, Neurology consult    Thank you  Charli Muñoz  [facility]  [phone]  Options provided:  -- Encephalopathy present as evidenced by, Please document evidence. -- Encephalopathy was ruled out  -- Encephalopathy superimposed on dementia present as evidenced by, Please document evidence. -- Other - I will add my own diagnosis  -- Disagree - Not applicable / Not valid  -- Disagree - Clinically unable to determine / Unknown  -- Refer to Clinical Documentation Reviewer    PROVIDER RESPONSE TEXT:    Provider disagreed with this query. Query created by: Rogelio Martino on 4/6/2022 10:44 AM      QUERY TEXT:    Good morning  Patient admitted with unresponsiveness. If possible, please document in progress notes and discharge summary if you are evaluating and /or treating any of the following:       The medical record reflects the following:  Risk Factors: Parkinson's, debility, CHF, hypoalbuminemia  Clinical Indicators: BMI 21.52, wt 70 kg, Malnutrition Status:  Severe malnutrition  Context:  Chronic illness  Findings of the 6 clinical characteristics of malnutrition:  Energy Intake:  7 - 75% or less est energy requirements for 1 month or longer  Weight Loss:  Mild weight loss (specify amount and time period) (per wife, 14# x 2 weeks)  Body Fat Loss:  7 - Severe body fat loss, Fat overlying ribs, Buccal region  Muscle Mass Loss:  7 - Severe muscle mass loss, Thigh (quadriceps),Clavicles (pectoralis &deltoids),Temples (temporalis)  Fluid Accumulation:  No significant fluid accumulation,   Strength:  Not performed  Treatment: labs, nutrition consult, Food and/or Nutrient Delivery: Continue current diet, Start oral nutrition supplement  Nutrition Education and Counseling: No recommendations at this time  Coordination of Nutrition Care: Continue to monitor while inpatient, Interdisciplinary rounds    Thank you  Olive Issa RN CDI  [phone]    ASPEN Criteria:  https://aspenjournals. onlinelibrary. smith. com/doi/full/10.1177/5553048421793931  Options provided:  -- Protein calorie malnutrition moderate  -- Protein calorie malnutrition severe  -- Other - I will add my own diagnosis  -- Disagree - Not applicable / Not valid  -- Disagree - Clinically unable to determine / Unknown  -- Refer to Clinical Documentation Reviewer    PROVIDER RESPONSE TEXT:    This patient has moderate protein calorie malnutrition.     Query created by: Consuelo Munoz on 4/6/2022 10:55 AM      Electronically signed by:  Guillermo Valero MD [facility] MD 4/7/2022 3:44 PM

## 2022-04-07 NOTE — HOSPICE
[facility]  [phone]     Patient Name: Damion Larson  YOB: 1935  Age: 81 y.o. [address] [facility] RN Note:  Hospice consult received, reviewing chart. Will follow up with Unit Nurse and Care Manager to discuss plan of care, patient status and discharge disposition     Patient w/ Parkinson's Disease and decline over last 2-3 months. Pleurex drain placed in March. Wife has been primary caregiver. She has hired caregiver 1x/week. Son and DIL live locally. Spoke briefly to wife, Bacilio Lopez ([phone]) and have arranged formal info session for home hospice tomorrow 04/08  at 3pm so DILLis can be present. Will follow up w/ CM after info session. [id number] [facility] is able to accept patient for home hospice if this is what wife desires. Thank you for the opportunity to be of service to this patient.   Victor Hugo Antonio, RN, BSN, [facility]  Clinical Nurse Liaison  [address] [facility]  [phone] Mobile  [phone] Office   Available on [employer]

## 2022-04-07 NOTE — PROGRESS NOTES
4/7/2022  Case Management Progress Note    4:45 PM  Patient is 81year old male admitted 4/4 with acute encephalopathy  Patient's RUR is 17% yellow/moderate risk for readmission  Covid test: none this admission  Chart reviewed--patient discussed at IDR rounds  Palliative team had goals of care meeting with family today, per their request [facility] Reed Marie hospice RN is aware. Will continue to follow and assist with discharge planning. Transition of Care Plan   1. Continue medical management/treatment  2. Hospice information session (referral sent)  3. Transportation--will need stretcher  4.  Cm will continue to follow    NATALIIA Estevez

## 2022-04-07 NOTE — PROGRESS NOTES
0730: Bedside and Verbal shift change report given to GIANNA Pate (oncoming nurse) by Adali NELSON RN (offgoing nurse). Report included the following information SBAR, Kardex, Intake/Output, MAR and Recent Results.

## 2022-04-07 NOTE — ACP (ADVANCE CARE PLANNING)
Primary Decision Maker: Sabine Rodriguez - Spouse - [phone]    Secondary Decision Maker: Eliane Andre - [phone]  Advance Care Planning 4/7/2022   Patient's Healthcare Decision Maker is: Legal Next of Kin   Confirm Advance Directive None   Patient Would Like to Complete Advance Directive Unable   Does the patient have other document types Do Not Resuscitate     Pt does not have AMD in place, wife reports pt signed $POA but stated document does not authorize medical decision making. In absence of assigned Medical POA, wife is legal NOK and surrogate decision maker. Pt already has inpt DNR order in place; DDNR was reviewed and completed on this date. Copy was placed on chart to be scanned into medical record, original was placed on chart to be sent out with pt at time of discharge.

## 2022-04-07 NOTE — PROGRESS NOTES
Palliative Medicine      Code Status: DNR    Advance Care Planning:  Advance Care Planning 4/7/2022   Patient's Healthcare Decision Maker is: Legal Next of Kin   Confirm Advance Directive None   Patient Would Like to Complete Advance Directive Unable   Does the patient have other document types Do Not Resuscitate       Patient / Family Encounter Documentation    Participants (names):  Pt, wife Roney Loaiza, Palliative Medicine (Dr. Dinh Figueroa, [facility])     Narrative:  Met with wife and dtr-in-law at bedside; pt rested through much of visit, was weak/difficult to understand when awake. Wife had initially hoped that pt could be evaluated by new provider at Neurology appt next week (4/13) but is now questioning ability to even get pt to the appt. Discussed hospice care with wife and dtr-in-law, reviewed hospice philosophy and services, reviewed hospice levels of care. Pt does not have AMD in place, wife reports pt signed $POA but stated document does not authorize medical decision making. In absence of assigned Medical POA, wife is legal NOK and surrogate decision maker. Pt already has inpt DNR order in place; DDNR was reviewed and completed on this date. Copy was placed on chart to be scanned into medical record, original was placed on chart to be sent out with pt at time of discharge. Psychosocial Issues Identified/ Resilience Factors: Caregiver stress, coping with pt's steadily declining condition and increased care needs. No spiritual concerns identified at this time. Caregiver Otley: High, wife is committed to caring for pt but will need a great deal of assistance in light of pt's increased care needs and frequent falls  Does the caregiver feel confident administering medication? Yes  Does the caregiver need any help connecting with community resources?   Hospice referral, wife does have a cg arranged for one day per week through an agency, will likely need to increase hours Does the caregiver feel confident assisting with activities of daily living? With assistance     Goals of Care / Plan: Family requested to meet with [facility] for additional information; Care Manager and Hospice liaison were notified. DDNR was completed on this date. SW will continue to follow for support. Thank you for including Palliative Medicine in the care of Mr. Keyana Jaime.      Indio Shine LCSW, [facility]-  [phone]

## 2022-04-07 NOTE — PROGRESS NOTES
Problem: Self Care Deficits Care Plan (Adult)  Goal: *Acute Goals and Plan of Care (Insert Text)  Description: FUNCTIONAL STATUS PRIOR TO ADMISSION: patient's subjective history provided by spouse at bedside. She reports functional decline over past 2-3 weeks where patient has mostly been wheelchair bound secondary to increased assistance needed for standing and upright mobility. Patient prior to this utilized a walker with spouse's assistance. Patient's spouse reports patient is dependent with ADLs including bathing, dressing, toileting. HOME SUPPORT PRIOR TO ADMISSION: The patient lived with his spouse and required moderate to maximal assistance  for all ADLs and wheelchair  transfers. Occupational Therapy Goals  Initiated 4/7/2022  1. Patient will perform grooming tasks with minimal assistance/contact guard assist within 7 day(s). 2.  Patient will perform self-feeding with supervision/set-up within 7 day(s). 3.  Patient will perform toilet transfers with moderate assistance  within 7 day(s). 4.  Patient will perform all aspects of toileting with maximal assistance within 7 day(s). 5.  Patient will participate in upper extremity therapeutic exercise/activities with supervision/set-up for 5 minutes within 7 day(s). Outcome: Progressing Towards Goal   OCCUPATIONAL THERAPY EVALUATION  Patient: Andrzej Montejo (84 y.o. male)  Date: 4/7/2022  Primary Diagnosis: Acute encephalopathy [G93.40]        Precautions:   Fall    ASSESSMENT  Based on the objective data described below, the patient presents with hospital admission secondary to acute encephalopathy. Patient received supine in bed, HOB elevated, and sleeping. Patient with history of Parkinson's Disease, CVA and dementia. Patient spouse present and able to provide full history and reports unable to continue care for patient at home in current state. Patient aroused to voice and able to tolerate rolling in bed for hygiene and repositioning. Patient following therapist commands and performs rolling with min assist x 1 and additional time. Patient reaches for bedrail to assist with rolling, total assist for hygiene. Patient repositioned for comfort. Current Level of Function Impacting Discharge (ADLs/self-care): up to total assist for ADLs    Functional Outcome Measure: The patient scored 5/100 on the Barthel Index outcome measure. Other factors to consider for discharge: lives with spouse who is no longer able to care for patient     Patient will benefit from skilled therapy intervention to address the above noted impairments. PLAN :  Recommendations and Planned Interventions: self care training, functional mobility training, therapeutic exercise, balance training, therapeutic activities, endurance activities, patient education, home safety training, and family training/education    Frequency/Duration: Patient will be followed by occupational therapy 2 times a week to address goals. Recommendation for discharge: (in order for the patient to meet his/her long term goals)  To be determined: SNF placement for rehab vs LTC placement depending on patient/family goals of care. This discharge recommendation:  Has not yet been discussed the attending provider and/or case management    IF patient discharges home will need the following DME: TBD       SUBJECTIVE:   Patient stated .    OBJECTIVE DATA SUMMARY:   HISTORY:   Past Medical History:   Diagnosis Date    Anemia     Atrial fibrillation ([address]     CHF (congestive heart failure), NYHA class I, chronic, diastolic ([address]     has had to have lungs drained.     CKD (chronic kidney disease), stage III (HCC)     Hx of completed stroke     right sided affected    Hypoalbuminemia     Orthostatic hypotension     Parkinson disease ([address]     Proteinuria      Past Surgical History:   Procedure Laterality Date    HX CHOLECYSTECTOMY      HX KNEE ARTHROSCOPY Left        Expanded or extensive additional review of patient history:     Home Situation  Home Environment: Private residence  # Steps to Enter: 0  One/Two Story Residence: One story  Living Alone: No  Support Systems: Spouse/Significant Other  Patient Expects to be Discharged to[de-identified] Unable to determine at this time  Current DME Used/Available at Home: Wheelchair    Hand dominance: Right    EXAMINATION OF PERFORMANCE DEFICITS:  Cognitive/Behavioral Status:                      Skin: intact as seen    Edema: none noted     Hearing: Auditory  Auditory Impairment: Hard of hearing, bilateral    Vision/Perceptual:                                     Range of Motion:  AROM: Generally decreased, functional                         Strength:  Strength: Grossly decreased, non-functional                Coordination:  Coordination: Generally decreased, functional            Tone & Sensation:  Tone: Normal                         Balance:       Functional Mobility and Transfers for ADLs:  Bed Mobility:  Rolling: Contact guard assistance;Minimum assistance;Assist x1;Additional time  Scooting: Total assistance;Assist x2 (higher up in bed)    Transfers:       ADL Assessment:  Feeding: Minimum assistance    Oral Facial Hygiene/Grooming: Maximum assistance    Bathing: Total assistance    Upper Body Dressing: Total assistance    Lower Body Dressing: Total assistance    Toileting: Total assistance                ADL Intervention and task modifications:                Therapeutic Exercise:     Functional Measure:    Barthel Index:  Bathin  Bladder: 0  Bowels: 0  Groomin  Dressin  Feedin  Mobility: 0  Stairs: 0  Toilet Use: 0  Transfer (Bed to Chair and Back): 0  Total: 5/100      The Barthel ADL Index: Guidelines  1. The index should be used as a record of what a patient does, not as a record of what a patient could do. 2. The main aim is to establish degree of independence from any help, physical or verbal, however minor and for whatever reason.   3. The need for supervision renders the patient not independent. 4. A patient's performance should be established using the best available evidence. Asking the patient, friends/relatives and nurses are the usual sources, but direct observation and common sense are also important. However direct testing is not needed. 5. Usually the patient's performance over the preceding 24-48 hours is important, but occasionally longer periods will be relevant. 6. Middle categories imply that the patient supplies over 50 per cent of the effort. 7. Use of aids to be independent is allowed. Score Interpretation (from [address] [address])    Independent   60-79 Minimally independent   40-59 Partially dependent   20-39 Very dependent   <20 Totally dependent     -Glenna Rodriguez., Barthel, DJermaineW. (1965). Functional evaluation: the Barthel Index. [address] [facility] ([address] (1997). The Barthel activities of daily living index: self-reporting versus actual performance in the old (> or = 75 years). Journal of [address] [place] [address] 45(7), [facility], JermaineJermaineJermaine, Meagan MendozaSelect [facility]ty [facility]., Dannial Bath. (1999). Measuring the change in disability after inpatient rehabilitation; comparison of the responsiveness of the Barthel Index and Functional [place] Measure. Journal of Neurology, Neurosurgery, and Psychiatry, 66(4), 621-601. Delbert Bourne, NMARY.MARIELOS, MARY Cadena, & Tamara Rodgers MJermaineA. (2004) Assessment of post-stroke quality of life in cost-effectiveness studies: The usefulness of the Barthel Index and the EuroQoL-5D.  Quality of Life Research, 15, 113-99         Occupational Therapy Evaluation Charge Determination   History Examination Decision-Making   LOW Complexity : Brief history review  LOW Complexity : 1-3 performance deficits relating to physical, cognitive , or psychosocial skils that result in activity limitations and / or participation restrictions  LOW Complexity : No comorbidities that affect functional and no verbal or physical assistance needed to complete eval tasks       Based on the above components, the patient evaluation is determined to be of the following complexity level: LOW   Pain Rating:      Activity Tolerance:   Poor    After treatment patient left in no apparent distress:    Supine in bed, Call bell within reach, Bed / chair alarm activated, Caregiver / family present, and Side rails x 3    COMMUNICATION/EDUCATION:   The patients plan of care was discussed with: Physical therapist and Registered nurse. Home safety education was provided and the patient/caregiver indicated understanding., Patient/family have participated as able in goal setting and plan of care. , and Patient/family agree to work toward stated goals and plan of care. This patients plan of care is appropriate for delegation to [facility].     Thank you for this referral.  Moose Solo OTR/L  Time Calculation: 15 mins

## 2022-04-07 NOTE — PROGRESS NOTES
Problem: Pressure Injury - Risk of  Goal: *Prevention of pressure injury  Description: Document Luisito Scale and appropriate interventions in the flowsheet. Outcome: Progressing Towards Goal  Note: Pressure Injury Interventions:  Sensory Interventions: Assess changes in LOC,Assess need for specialty bed,Avoid rigorous massage over bony prominences,Keep linens dry and wrinkle-free,Maintain/enhance activity level,Minimize linen layers,Monitor skin under medical devices,Turn and reposition approx. every two hours (pillows and wedges if needed)    Moisture Interventions: Absorbent underpads,Apply protective barrier, creams and emollients,Assess need for specialty bed,Limit adult briefs,Minimize layers,Maintain skin hydration (lotion/cream),Moisture barrier    Activity Interventions: Assess need for specialty bed,Increase time out of bed,Pressure redistribution bed/mattress(bed type),PT/OT evaluation    Mobility Interventions: Assess need for specialty bed,Pressure redistribution bed/mattress (bed type),PT/OT evaluation,Turn and reposition approx. every two hours(pillow and wedges)    Nutrition Interventions: Document food/fluid/supplement intake,Offer support with meals,snacks and hydration    Friction and Shear Interventions: Apply protective barrier, creams and emollients,Minimize layers,Lift team/patient mobility team                Problem: Patient Education: Go to Patient Education Activity  Goal: Patient/Family Education  Outcome: Progressing Towards Goal     Problem: Falls - Risk of  Goal: *Absence of Falls  Description: Document Allyssa Latham Fall Risk and appropriate interventions in the flowsheet.   Outcome: Progressing Towards Goal  Note: Fall Risk Interventions:  Mobility Interventions: Bed/chair exit alarm,Communicate number of staff needed for ambulation/transfer,Strengthening exercises (ROM-active/passive),Utilize walker, cane, or other assistive device,Utilize gait belt for transfers/ambulation    Mentation Interventions: Adequate sleep, hydration, pain control,Bed/chair exit alarm,Door open when patient unattended,More frequent rounding,Reorient patient,Room close to nurse's station    Medication Interventions: Bed/chair exit alarm,Patient to call before getting OOB,Teach patient to arise slowly,Utilize gait belt for transfers/ambulation    Elimination Interventions: Bed/chair exit alarm,Call light in reach,Patient to call for help with toileting needs,Toileting schedule/hourly rounds    History of Falls Interventions: Bed/chair exit alarm,Door open when patient unattended,Room close to nurse's station,Utilize gait belt for transfer/ambulation         Problem: Patient Education: Go to Patient Education Activity  Goal: Patient/Family Education  Outcome: Progressing Towards Goal     Problem: Discharge Planning  Goal: *Discharge to safe environment  Outcome: Progressing Towards Goal  Goal: *Knowledge of medication management  Outcome: Progressing Towards Goal  Goal: *Knowledge of discharge instructions  Outcome: Progressing Towards Goal     Problem: Patient Education: Go to Patient Education Activity  Goal: Patient/Family Education  Outcome: Progressing Towards Goal     Problem: Nutrition Deficit  Goal: *Optimize nutritional status  Outcome: Progressing Towards Goal

## 2022-04-07 NOTE — PROGRESS NOTES
[address] Adult  Hospitalist Group                                                                                          Hospitalist Progress Note  Krista Marley MD        Date of Service:  2022  NAME:  Patrice Vaz  :  1935  MRN:  [id number]      Admission Summary:   81-year-old male presented after possible seizure/syncopal episode    Interval history / Subjective:   Pt drowsy but arouseable, unable to give history. Discussed with wife at bedside. Assessment & Plan:     1. Unresponsiveness/syncope (2022)  -EEG unremarkable other than mild generalized slowing seen in encephalopathy  -MRI with generalized parenchymal volume loss and severe microvascular ischemic disease. MRI with encephalomalacia in the left basal ganglia  -neurology evaluated x2, episodes are related to syncope followed by convulsion secondary to orthostatic hypotension. No AED indicated. -follows at [facility], possible multisystem atrophy given significant orthostatic hypotension, patient was on midrodrine, but held due to hypertension now  -prognosis is poor, family to discuss with palliative today about [address]    2. Chronic atrial fibrillation. Rate is well controlled. Recent ECHO with EF 60%. Continue Eliquis and digoxin          3. Parkinson disease ([facility] [address] [facility] Continue Sinemet     4. CHF (congestive heart failure), NYHA class I, chronic, diastolic (HCC)/Pleural effusion, bilateral (2022). Compensated. Not usually on any diuretic in setting of orthostatic hypotension. Has pleural fluid drain on the right     5. Dementia/Hx of completed stroke. Supportive care     6.  Anemia - likely due to chronic disease. Recent serologies unremarkable.     Code status: DNR  DVT prophylaxis: [facility] Problems  Date Reviewed: 2022          Codes Class Noted POA    Acute encephalopathy ICD-10-CM: G93.40  ICD-9-CM: 348.30  2022 Unknown        Seizure ([facility] [address] ICD-10-CM: R56.9  ICD-9-CM: 780.39  2022 Unknown        Pleural effusion, bilateral ICD-10-CM: J90  ICD-9-CM: 511.9  4/4/2022 Unknown        Unresponsiveness ICD-10-CM: R41.89  ICD-9-CM: 780.09  4/4/2022 Unknown        CHF (congestive heart failure), NYHA class I, chronic, diastolic (HCC) AGY-93-WP: I50.32  ICD-9-CM: 428.32  Unknown Yes    Overview Signed 2/6/2022  8:35 PM by Aquilino Loaiza MD     has had to have lungs drained. Hx of completed stroke ICD-10-CM: Z86.73  ICD-9-CM: V12.54  Unknown Yes    Overview Signed 2/6/2022  8:35 PM by Aquilino Loaiza MD     right sided affected             Orthostatic hypotension ICD-10-CM: I95.1  ICD-9-CM: 458.0  Unknown Yes        Parkinson disease ([facility] [address] ICD-10-CM: G20  ICD-9-CM: 332.0  Unknown Yes        Chronic a-fib ([facility] [address] ICD-10-CM: I48.20  ICD-9-CM: 427.31  Unknown Unknown                Review of Systems:   A comprehensive review of systems was negative except for that written in the HPI. Vital Signs:    Last 24hrs VS reviewed since prior progress note. Most recent are:  Visit Vitals  BP (!) 149/65 (BP 1 Location: Right upper arm, BP Patient Position: At rest)   Pulse 62   Temp 97.8 °F (36.6 °C)   Resp 22   Ht 5' 11\" (1.803 m)   Wt 70.6 kg (155 lb 10.3 oz)   SpO2 91%   BMI 21.71 kg/m²         Intake/Output Summary (Last 24 hours) at 4/7/2022 1252  Last data filed at 4/7/2022 0316  Gross per 24 hour   Intake 20 ml   Output 0 ml   Net 20 ml        Physical Examination:             Constitutional:  No acute distress, cooperative, pleasant    ENT:  Oral mucosa moist, oropharynx benign. Resp:  CTA bilaterally. No wheezing/rhonchi/rales. No accessory muscle use   CV:  Regular rhythm, normal rate, no murmurs, gallops, rubs    GI:  Soft, non distended, non tender. normoactive bowel sounds, no hepatosplenomegaly     Musculoskeletal:  No edema, warm, 2+ pulses throughout    Neurologic:  Moves all extremities. AAOx3, CN II-XII reviewed     Psych:  Good insight, Not anxious nor agitated. Data Review:    Review and/or order of clinical lab test      Labs:     Recent Labs     04/07/22 0234 04/06/22  0235   WBC 5.4 6.2   HGB 9.0* 9.3*   HCT 30.5* 31.6*    230     Recent Labs     04/07/22 0234 04/06/22 0235 04/05/22  0406    140 138   K 4.0 3.7 4.2   CL 97 94* 95*   CO2 39* 42* 42*   BUN 23* 26* 26*   CREA 0.98 1.01 1.02   * 105* 97   CA 8.7 8.7 9.0   MG  --   --  2.4   PHOS  --   --  2.7     No results for input(s): ALT, AP, TBIL, TBILI, TP, ALB, GLOB, GGT, AML, LPSE in the last 72 hours. No lab exists for component: SGOT, GPT, AMYP, HLPSE  No results for input(s): INR, PTP, APTT, INREXT, INREXT in the last 72 hours. No results for input(s): FE, TIBC, PSAT, FERR in the last 72 hours. Lab Results   Component Value Date/Time    Folate 12.8 12/21/2021 04:12 AM      No results for input(s): PH, PCO2, PO2 in the last 72 hours. No results for input(s): CPK, CKNDX, TROIQ in the last 72 hours.     No lab exists for component: CPKMB  No results found for: CHOL, CHOLX, CHLST, CHOLV, HDL, HDLP, LDL, LDLC, DLDLP, TGLX, TRIGL, TRIGP, CHHD, CHHDX  No results found for: [facility]  Lab Results   Component Value Date/Time    Color DARK YELLOW 04/04/2022 04:45 AM    Appearance CLEAR 04/04/2022 04:45 AM    Specific gravity 1.023 04/04/2022 04:45 AM    pH (UA) 6.5 04/04/2022 04:45 AM    Protein 30 (A) 04/04/2022 04:45 AM    Glucose Negative 04/04/2022 04:45 AM    Ketone 15 (A) 04/04/2022 04:45 AM    Urobilinogen 1.0 04/04/2022 04:45 AM    Nitrites Negative 04/04/2022 04:45 AM    Leukocyte Esterase TRACE (A) 04/04/2022 04:45 AM    Epithelial cells FEW 04/04/2022 04:45 AM    Bacteria Negative 04/04/2022 04:45 AM    WBC 0-4 04/04/2022 04:45 AM    RBC 0-5 04/04/2022 04:45 AM         Medications Reviewed:     Current Facility-Administered Medications   Medication Dose Route Frequency    clonazePAM (KlonoPIN) tablet 0.5 mg  0.5 mg Oral QHS    apixaban (ELIQUIS) tablet 5 mg  5 mg Oral BID    carbidopa-levodopa (SINEMET)  mg per tablet 1.5 Tablet  1.5 Tablet Oral QID    digoxin (LANOXIN) tablet 0.25 mg  0.25 mg Oral DAILY    [Held by provider] midodrine (PROAMATINE) tablet 2.5 mg  2.5 mg Oral TID    sodium chloride (NS) flush 5-40 mL  5-40 mL IntraVENous Q8H    sodium chloride (NS) flush 5-40 mL  5-40 mL IntraVENous PRN    acetaminophen (TYLENOL) tablet 650 mg  650 mg Oral Q6H PRN    Or    acetaminophen (TYLENOL) suppository 650 mg  650 mg Rectal Q6H PRN    polyethylene glycol (MIRALAX) packet 17 g  17 g Oral DAILY PRN    ondansetron (ZOFRAN ODT) tablet 4 mg  4 mg Oral Q8H PRN    Or    ondansetron (ZOFRAN) injection 4 mg  4 mg IntraVENous Q6H PRN    hydrALAZINE (APRESOLINE) 20 mg/mL injection 20 mg  20 mg IntraVENous Q6H PRN     ______________________________________________________________________  EXPECTED LENGTH OF STAY: 5d 12h  ACTUAL LENGTH OF STAY:          Marko Ross MD

## 2022-04-07 NOTE — PROGRESS NOTES
Problem: Mobility Impaired (Adult and Pediatric)  Goal: *Acute Goals and Plan of Care (Insert Text)  Description: FUNCTIONAL STATUS PRIOR TO ADMISSION: patient's subjective history provided by spouse at bedside. She reports functional decline over past 2-3 weeks where patient has mostly been wheelchair bound secondary to increased assistance needed for standing and upright mobility. Patient prior to this utilized a walker with spouse's assistance. Patient's spouse reports patient is dependent with ADLs including bathing, dressing, toileting. HOME SUPPORT PRIOR TO ADMISSION: The patient lived with his spouse and required moderate to maximal assistance  for all ADLs and wheelchair  transfers . Physical Therapy Goals  Initiated 4/7/2022  1. Patient will move from supine to sit and sit to supine , scoot up and down, and roll side to side in bed with contact guard assist within 7 day(s). 2. Patient will maintain arousal and participation in physical therapy treatment for at least 10 minutes within 7 days. 3.  Patient will transfer from bed to chair and chair to bed with moderate assistance  using the least restrictive device within 7 day(s). 4.  Patient will perform sit to stand with moderate assistance  within 7 day(s). Outcome: Not Met   PHYSICAL THERAPY EVALUATION  Patient: Yeison Rodriguez (29 y.o. male)  Date: 4/7/2022  Primary Diagnosis: Acute encephalopathy [G93.40]        Precautions:   Fall      ASSESSMENT  Based on the objective data described below, the patient presents with decreased arousal and participation in physical therapy evaluation, decreased strength, good command following and overall reduced functional mobility in the setting of hospital admission for acute encephalopathy. PMH notable for parkinson's, CVA, dementia and orthostatic hypotension. PTA patient with functional decline over past several weeks with multiple falls.  In the past few weeks patient has been mostly wheelchair bound with mod/maxA required from patient's wife to assist with transfer. He requires 3L NC at baseline. Patient with + fall while inpatient. Patient underwent EEG which was - for seizure. Patient's spouse requesting therapy not get patient out of bed today as she feels his episodes are tied to mobility. Therapists providing assistance per spouse's request with brief change and repositioning in bed. Patient with good command following and rolls to L and R with minAx1 and additional time. He automatically reaches for bedrail to assist. PT will continue to follow patient to assist with disposition recommendations as appropriate and to work towards above goals. Current Level of Function Impacting Discharge (mobility/balance): Volodymyr rolling in bed    Functional Outcome Measure: The patient scored Total: 5/100 on the Barthel Index outcome measure     Other factors to consider for discharge: Patient's wife tells therapists today that she is not sure what decisions she and her family will make regarding patient's care. She is clear that she is not able to continue caring for the patient's needs in the home and it has become too difficult for her to manage. Today she is not interested in pursuing skilled rehab placement for her . Patient will benefit from skilled therapy intervention to address the above noted impairments. PLAN :  Recommendations and Planned Interventions: bed mobility training, transfer training, gait training, therapeutic exercises, patient and family training/education, and therapeutic activities      Frequency/Duration: Patient will be followed by physical therapy:  3 times a week to address goals.     Recommendation for discharge: (in order for the patient to meet his/her long term goals)  To be determined: SNF level rehab vs. jail/LTC depending on family's wishes     This discharge recommendation:  Has not yet been discussed the attending provider and/or case management    IF patient discharges home will need the following DME: n/a         SUBJECTIVE:   Patient stated Chiphuseyin Martinez.     OBJECTIVE DATA SUMMARY:   Patient received supine in bed and was agreeable to participate in PT session. Patient was cleared by nursing to participate in PT session. Patient's spouse present and participates in entire therapy session    HISTORY:    Past Medical History:   Diagnosis Date    Anemia     Atrial fibrillation (HCC)     CHF (congestive heart failure), NYHA class I, chronic, diastolic (HCC)     has had to have lungs drained. CKD (chronic kidney disease), stage III (HCC)     Hx of completed stroke     right sided affected    Hypoalbuminemia     Orthostatic hypotension     Parkinson disease (HCC)     Proteinuria      Past Surgical History:   Procedure Laterality Date    HX CHOLECYSTECTOMY      HX KNEE ARTHROSCOPY Left        Personal factors and/or comorbidities impacting plan of care: parkinson's     Home Situation  Home Environment: Private residence  # Steps to Enter: 0  One/Two Story Residence: One story  Living Alone: No  Support Systems: Spouse/Significant Other  Patient Expects to be Discharged to[de-identified] Unable to determine at this time  Current DME Used/Available at Home: Wheelchair    EXAMINATION/PRESENTATION/DECISION MAKING:   Critical Behavior:  Neurologic State: Alert,Eyes open spontaneously  Orientation Level: Oriented to person,Disoriented to time,Disoriented to situation,Disoriented to place  Cognition: Follows commands     Hearing:   Auditory  Auditory Impairment: Hard of hearing, bilateral  Skin:  defer to RN notes  Edema: none apparent   Range Of Motion:  AROM: Generally decreased, functional                       Strength:    Strength: Grossly decreased, non-functional                    Tone & Sensation:   Tone: Normal                              Coordination:  Coordination: Generally decreased, functional  Vision:      Functional Mobility:  Bed Mobility:  Rolling: Contact guard assistance;Minimum assistance;Assist x1;Additional time        Scooting: Total assistance;Assist x2 (higher up in bed)  Transfers:      NTT                       Balance:      Ambulation/Gait Training:         NTT                                       Functional Measure:  Barthel Index:    Bathin  Bladder: 0  Bowels: 0  Groomin  Dressin  Feedin  Mobility: 0  Stairs: 0  Toilet Use: 0  Transfer (Bed to Chair and Back): 0  Total: 5/100       The Barthel ADL Index: Guidelines  1. The index should be used as a record of what a patient does, not as a record of what a patient could do. 2. The main aim is to establish degree of independence from any help, physical or verbal, however minor and for whatever reason. 3. The need for supervision renders the patient not independent. 4. A patient's performance should be established using the best available evidence. Asking the patient, friends/relatives and nurses are the usual sources, but direct observation and common sense are also important. However direct testing is not needed. 5. Usually the patient's performance over the preceding 24-48 hours is important, but occasionally longer periods will be relevant. 6. Middle categories imply that the patient supplies over 50 per cent of the effort. 7. Use of aids to be independent is allowed. Score Interpretation (from [address] [address])    Independent   60-79 Minimally independent   40-59 Partially dependent   20-39 Very dependent   <20 Totally dependent     -Glenna Rodriguez., Barthel, D.W. (1965). Functional evaluation: the Barthel Index. [facility] ([address] (1997). The Barthel activities of daily living index: self-reporting versus actual performance in the old (> or = 75 years). Journal of [address] [place] [address] 45(7), [facility], J.J..F, Jeanna Frankel., Orestes Sarabia. (1999).  Measuring the change in disability after inpatient rehabilitation; comparison of the responsiveness of the Barthel Index and Functional Barker Measure. Journal of Neurology, Neurosurgery, and Psychiatry, 66(4), 465-521. MARION Lambert, MARY Cadena, & Sohan Li M.A. (2004) Assessment of post-stroke quality of life in cost-effectiveness studies: The usefulness of the Barthel Index and the EuroQoL-5D. Quality of Life Research, 15, 717-32        Physical Therapy Evaluation Charge Determination   History Examination Presentation Decision-Making   HIGH Complexity :3+ comorbidities / personal factors will impact the outcome/ POC  HIGH Complexity : 4+ Standardized tests and measures addressing body structure, function, activity limitation and / or participation in recreation  HIGH Complexity : Unstable and unpredictable characteristics  HIGH Complexity : FOTO score of 1- 25       Based on the above components, the patient evaluation is determined to be of the following complexity level: HIGH     Pain Rating:  Patient without reports of pain during therapy      Activity Tolerance:   Fair and tolerates ADLs without rest breaks    After treatment patient left in no apparent distress:   Supine in bed, Call bell within reach, Bed / chair alarm activated, Caregiver / family present, and Side rails x 3    COMMUNICATION/EDUCATION:   The patients plan of care was discussed with: Occupational therapist and Registered nurse. Fall prevention education was provided and the patient/caregiver indicated understanding. and Patient/family have participated as able in goal setting and plan of care.     Thank you for this referral.  Dimitris Ortiz PT, DPT   Time Calculation: 15 mins

## 2022-04-08 NOTE — HOSPICE
[facility]  [phone]     Patient Name: Kieran Chowdhury  YOB: 1935  Age: 81 y.o. [facility] RN Note:  Plan to meet w/ wife and daughter in law today at 3pm to discuss hospice discharge. Will need o2, bed and obt for home- once hospice confirmed, will arrange for delivery tomorrow AM  Admission slot being held for tomorrow afternoon  Will need transport arranged for Saturday - CM notified   DDNR already on file, will have wife sign consents at meeting if amenable to hospice. Tentative plan for DC home w. Hospice 04/09 afternoon pending outcome of family meeting today      Thank you for the opportunity to be of service to this patient.

## 2022-04-08 NOTE — PROGRESS NOTES
4/8/2022  Case Management Progress Note    2:32 PM  Noted consult for Saturday transport. Please see 12:33pm addendum RE: transport already set up. NATALIIA Nathan    12:33 PM  [address] holding admission slot for tomorrow afternoon. Transportation set for 2pm with [facility]. NATALIIA Nathan    12:22 PM  Patient is 81year old male admitted 4/4 with acute encephalopathy  Patient's RUR is 16% yellow/moderate risk for readmission  Covid test: none this admission  Chart reviewed  Patient and family have a meeting with hospice at 3pm this afternoon. Anticipating possible discharge home with hospice tomorrow if hospice team can schedule admission. Patient and family have already been advised to look into making space for hospital bed/equipment etc. Will continue to follow and assist with discharge planning as needed. Transition of Care Plan   1. Continue medical management/treatment  2. Home with hospice tomorrow if family agreeable and admission slot available  3. Will likely need stretcher transportation   4.  CM will continue to follow and assist with discharge planning    NATALIIA Nathan

## 2022-04-08 NOTE — PALLIATIVE CARE DISCHARGE
The Palliative Medicine team was consulted as part of your / your loved one's care in the hospital. Our team is a supportive service; we strive to relieve suffering and improve quality of life. You identified the following goal(s) as your main focus for healthcare: Patient/Health Care Proxy Stated Goals: Comfort. You will be returning home with [facility] from [facility]. We reviewed advance care planning information, which includes the following:  Advance Care Planning 4/7/2022   Patient's Healthcare Decision Maker is: Legal Next of Kin   Confirm Advance Directive None   Patient Would Like to Complete Advance Directive Unable   Does the patient have other document types Do Not Resuscitate       We reviewed / discussed your code status as: DNR     Full Code means perform CPR in the event of cardiac arrest     [facility] means do NOT perform CPR in the event of cardiac arrest     Partial Code means you have specific preferences, please discuss with your health care team     No Order means this issue was not addressed / resolved during your stay    You have a Durable Do Not Resuscitate Order in place, which should travel with you. When you are in a facility, this form should be placed on your chart. Once you are home, it is recommended that the [facility] form be placed in a visible location such as on the refrigerator or bedroom door. Because of the importance of this information, we are providing you with a printed copy to share with other healthcare providers after this hospitalization is complete. If any of the above information is incomplete or incorrect, please contact the Palliative Medicine team at [phone].

## 2022-04-08 NOTE — PROGRESS NOTES
[facility]     Name: Treva Lyman MRN: [id number]   : 1935 Hospital: [address] [facility]   Date: 2022        Impression Plan   1. Acute on chronic respiratory failure  2. Acute on chronic CO2 retention  3. Failure to thrive  4. Encephalopathy  5. Recurrent pleural effusions                 · Patient is severly debilitated with chronic CO2 retention, persistent pleural effusions and multiple other medical problems. Discussed with wife at bedside about the lack of long term and even lack of short term gains by performing a thoracentesis on the left. The patient has gone down hill in the last 6 months and very rapidly in the last week despite right sided pleurex drainage. · Neurology following  · Wean O2 as tolerated, keeping sats above 90%  · Palliative has evaluated. Wife was hopeful for further recovery, but realistic about patient's condition and willing to further discuss hospice with daughter-in-law present. Plan is for meeting this afternoon. Radiology  (personally reviewed) CXR reviewed: essentially unchanged pleural effusions compared to CXR one month ago. Subjective     Cc: pleural effusions    82 yo with PMHx of atrial fibrillation and diastolic heart failure presenting with encephalopathy, lethargy and hypoxia. Pulmonary was consulted for bilateral pleural effusions. Pt has had multiple thoracentesis done bilaterally since 2022 and had a tunneled pleural catheter placed on the right in the last month. Wife has difficulties remembering the amounts removed on once weekly drainage, but it sounds like smaller amounts (245-300cc). Pt has acute on chronic CO2 retention on ABG and only responds to noxious stimuli. Wife states that pt has eaten very little in the last few weeks and overall decline in the last 6 months. Pt is a 100% on 3 L.      Interval history  Afebrile  HR stable  BP stable  Sats 96% on 2L  Bicarb 39 - better; ABG compensated  Creat 0.98  TSH 2.63    No issues noted overnight per nursing. Review of Systems:  Review of systems not obtained due to patient factors. He is awake and alert this morning. Past Medical History:   Diagnosis Date    Anemia     Atrial fibrillation ([facility] [address]     CHF (congestive heart failure), NYHA class I, chronic, diastolic (HCC)     has had to have lungs drained.  CKD (chronic kidney disease), stage III ([address]     Hx of completed stroke     right sided affected    Hypoalbuminemia     Orthostatic hypotension     Parkinson disease (HCC)     Proteinuria       Past Surgical History:   Procedure Laterality Date    HX CHOLECYSTECTOMY      HX KNEE ARTHROSCOPY Left       Prior to Admission medications    Medication Sig Start Date End Date Taking? Authorizing Provider   furosemide (LASIX) 40 mg tablet Take 40 mg by mouth daily. Yes Provider, Historical   digoxin (LANOXIN) 0.25 mg tablet Take 0.25 mg by mouth daily. Yes Other, MD Parish   acetaminophen (Tylenol Extra Strength) 500 mg tablet Take 1,000 mg by mouth two (2) times a day. Yes Provider, Historical   apixaban (Eliquis) 5 mg tablet Take 5 mg by mouth two (2) times a day. Yes Other, MD sathya Lugoodrine (PROAMATINE) 2.5 mg tablet Take 2.5 mg by mouth three (3) times daily. Yes Cedric, MD Parish   carbidopa-levodopa (SINEMET)  mg per tablet Take 1.5 Tablets by mouth four (4) times daily.    Yes Cedric, MD Parish     Current Facility-Administered Medications   Medication Dose Route Frequency    clonazePAM (KlonoPIN) tablet 0.5 mg  0.5 mg Oral QHS    apixaban (ELIQUIS) tablet 5 mg  5 mg Oral BID    carbidopa-levodopa (SINEMET)  mg per tablet 1.5 Tablet  1.5 Tablet Oral QID    digoxin (LANOXIN) tablet 0.25 mg  0.25 mg Oral DAILY    [Held by provider] midodrine (PROAMATINE) tablet 2.5 mg  2.5 mg Oral TID    sodium chloride (NS) flush 5-40 mL  5-40 mL IntraVENous Q8H     No Known Allergies   Social History     Tobacco Use    Smoking status: Not on file    Smokeless tobacco: Not on file   Substance Use Topics    Alcohol use: Not on file      No family history on file. Laboratory: I have personally reviewed the flowsheet and labs. Recent Labs     04/07/22 0234 04/06/22  0235   WBC 5.4 6.2   HGB 9.0* 9.3*   HCT 30.5* 31.6*    230     Recent Labs     04/07/22  0234 04/06/22 0235    140   K 4.0 3.7   CL 97 94*   CO2 39* 42*   * 105*   BUN 23* 26*   CREA 0.98 1.01   CA 8.7 8.7       Objective:     Visit Vitals  BP (!) 145/63 (BP 1 Location: Left upper arm, BP Patient Position: At rest)   Pulse (!) 53   Temp 98 °F (36.7 °C)   Resp 22   Ht 5' 11\" (1.803 m)   Wt 70.6 kg (155 lb 10.3 oz)   SpO2 96%   BMI 21.71 kg/m²         Intake/Output Summary (Last 24 hours) at 4/8/2022 0857  Last data filed at 4/7/2022 1952  Gross per 24 hour   Intake 10 ml   Output 0 ml   Net 10 ml     EXAM:   GENERAL: elderly, debilitated, awake, somewhat responsive, HEENT:  anicteric, EOMI, no alar flaring or epistaxis, oral mucosa moist without cyanosis, NECK:  no jugular vein distention, no retractions, no thyromegaly or masses, LUNGS: Decreased breath sounds in bases bilaterally, HEART:  Regular rate and rhythm with no MGR; trace edema is present, ABDOMEN:  soft with no tenderness, bowel sounds present, EXTREMITIES:  warm with no cyanosis, SKIN:  no jaundice or ecchymosis and NEUROLOGIC:  Awake, appears comfortable, upper extremity rhythmic jerking noted.     Juan Jose Edwards, [place]  [facility]

## 2022-04-08 NOTE — PROGRESS NOTES
Comprehensive Nutrition Assessment    Type and Reason for Visit: Reassess    Nutrition Recommendations/Plan:   1. Continue regular diet or per comfort   2. Provide Magic Cup twice daily to increase kcal/protein intake (580 kcal, 76 g carbs, 18 g protein)    Nutrition Assessment:    4/8: Follow up. No meal/supplement intake documented. Wife endorses poor PO intake but patient enjoying magic cup. Wife states meeting with hospice at three and indicated that discharging home with hospice was likely the plan. No N/V/D at this time. RD provided menu to wife to order food for patient. Patient sleeping at time of RD visit and did not awaken. Documented Meal intake:  No data found. Documentation of supplement intake:  No data found. Last 3 Recorded Weights in this Encounter    04/04/22 0313 04/05/22 1953 04/07/22 1043   Weight: 68.5 kg (151 lb 0.2 oz) 70 kg (154 lb 5.2 oz) 70.6 kg (155 lb 10.3 oz)       4/6: Pt is an 81year old male admitted with  Acute encephalopathy [G93.40]. He  has a past medical history of Anemia, Atrial fibrillation ([address] CHF (congestive heart failure), NYHA class I, chronic, diastolic ([address] CKD (chronic kidney disease), stage III ([address] completed stroke, Hypoalbuminemia, Orthostatic hypotension, Parkinson disease ([address] and Proteinuria. BPA for recent wt loss 2 - 13# and poor intake. Per documentation, patient has lost 9# (5.4%) within 5 months - not clinically significant for timeframe. All information obtained from wife at bedside. Per wife, # and he weighed this amount two weeks ago - this would be a 14# (8.3%) x 2 week loss, clinically significant for timeframe. No documented meal intake. Patient noted to have coughing/spitting up and pocketing during breakfast today. Wife denies chewing/swallowing problems. Endorses decreased appetite x \"several days\". Has been ordering meals for . NKFA.  States that they have tried multiple supplements in the past and he has disliked all Ensures/Boosts tried. Voiced acceptance of [employer]. Currently on 5 L O2. Wt Readings from Last 10 Encounters:   04/07/22 70.6 kg (155 lb 10.3 oz)   02/11/22 68.5 kg (151 lb)   12/23/21 74 kg (163 lb 2.3 oz)       Malnutrition Assessment:  Malnutrition Status:  Severe malnutrition    Context:  Chronic illness     Findings of the 6 clinical characteristics of malnutrition:   Energy Intake:  7 - 75% or less est energy requirements for 1 month or longer  Weight Loss:  Mild weight loss (specify amount and time period) (per wife, 14# x 2 weeks)     Body Fat Loss:  7 - Severe body fat loss, Fat overlying ribs,Buccal region   Muscle Mass Loss:  7 - Severe muscle mass loss, Thigh (quadriceps),Clavicles (pectoralis &deltoids),Temples (temporalis)  Fluid Accumulation:  No significant fluid accumulation,     Strength:  Not performed     Estimated Daily Nutrient Needs:  Energy (kcal): 1695 (MSJ x 1.2); Weight Used for Energy Requirements: Current  Protein (g): 70-84 (1.0-1.2); Weight Used for Protein Requirements: Current  Fluid (ml/day): 1695; Method Used for Fluid Requirements: 1 ml/kcal    Nutrition Related Findings:       ABD: WDL  Last BM: 04/07/22,    Edema:LLE: 1+ (4/7/2022  7:52 PM)  RLE: 1+ (4/7/2022  7:52 PM)      Nutr. Labs:    Lab Results   Component Value Date/Time    GFR est AA >60 04/07/2022 02:34 AM    GFR est non-AA >60 04/07/2022 02:34 AM    Creatinine 0.98 04/07/2022 02:34 AM    BUN 23 (H) 04/07/2022 02:34 AM    Sodium 137 04/07/2022 02:34 AM    Potassium 4.0 04/07/2022 02:34 AM    Chloride 97 04/07/2022 02:34 AM    CO2 39 (H) 04/07/2022 02:34 AM       Lab Results   Component Value Date/Time    Glucose 101 (H) 04/07/2022 02:34 AM       Lab Results   Component Value Date/Time    Hemoglobin A1c 6.0 (H) 12/21/2021 04:12 AM       Nutr.  Meds:  Eliquis, sinemet, zofran PRN, miralax PRN    Wounds:    Surgical incision,Multiple       Current Nutrition Therapies:  ADULT DIET Regular  ADULT ORAL NUTRITION SUPPLEMENT Lunch, Dinner; Frozen Supplement    Anthropometric Measures:  · Height:  5' 11\" (180.3 cm)  · Current Body Wt:  70.6 kg (155 lb 10.3 oz)   · Admission Body Wt:  154 lb 5.2 oz    · Usual Body Wt:  72.6 kg (160 lb)     · Ideal Body Wt:  172 lbs:  90.5 %   Body mass index is 21.71 kg/m².   · BMI Category:  Underweight (BMI less than 22) age over 72       Nutrition Diagnosis:   · Severe malnutrition related to inadequate protein-energy intake as evidenced by intake 0-25%,weight loss,moderate muscle loss,moderate loss of subcutaneous fat    Nutrition Interventions:   Food and/or Nutrient Delivery: Continue current diet,Modify oral nutrition supplement  Nutrition Education and Counseling: No recommendations at this time  Coordination of Nutrition Care: Continue to monitor while inpatient,Interdisciplinary rounds    Goals:  PO intake provided for comfort/as tolerated       Nutrition Monitoring and Evaluation:   Behavioral-Environmental Outcomes: None identified  Food/Nutrient Intake Outcomes: Food and nutrient intake,Supplement intake  Physical Signs/Symptoms Outcomes: Biochemical data,Weight,Skin    Discharge Planning:    Continue oral nutrition supplement     Electronically signed by Timmy Barcenas, [address] [place] [address] on 6/4/3466  Contact: [phone] or via [employer]

## 2022-04-08 NOTE — DISCHARGE SUMMARY
Physician Discharge Summary     Patient ID:  Paige Hidalgo  [id number]  81 y.o.  1935    Admit date: 4/4/2022    Discharge date of service and time: 4/9/2022    Admission Diagnoses: Acute encephalopathy [G93.40]    Discharge Diagnoses:    Principal Diagnosis     Acute metabolic encephalopathy                                             Hospital Course and other diagnoses  Acute metabolic encephalopathy / Unresponsiveness / Syncope - POA due to IVVD, medications and underlying illnesses. EEG bland. MRI with volume loss,  severe microvascular ischemic disease, encephalomalacia in the left basal ganglia. No AED indicated.      Chronic atrial fibrillation - Rate controlled on digoxin.  Recent ECHO with EF 60%. Stop Eliquis on hospice          Parkinson disease - Continue Sinemet for comfort. Worse than baseline. Fall precautions.  No value for PT OT eval.      CHF (congestive heart failure), NYHA class I, chronic, diastolic / Pleural effusion, bilateral - CHF POA and Compensated. Not on diuretic due to orthostatic hypotension.  Has R pleural fluid drain for comfort     Dementia / Hx of completed stroke / FTT in adult - Supportive care. He will enter hospice     Anemia - POA due to chronic disease. Recent serologies unremarkable.     Chronic respiratory failure with hypoxia - POA, driven by atelectasis and CHF. Hospice to provide Oxygen as needed.     Orthostatic hypotension - No longer on midodrine     PCP: Carla Elizondo MD    Consults: Cardiology and Neurology    Significant Diagnostic Studies: See Hospital Course    Discharged home in improved condition.     Discharge Exam:  BP (!) 160/68 (BP 1 Location: Left upper arm, BP Patient Position: At rest)   Pulse 69   Temp 98.1 °F (36.7 °C)   Resp 16   Ht 5' 11\" (1.803 m)   Wt 71.3 kg (157 lb 3 oz)   SpO2 98%   BMI 21.92 kg/m²      Gen:  Frail, thin, ill appearing, in no acute distress  HEENT:  Pink conjunctivae, PERRL, hearing intact to voice, moist mucous membranes  Neck:  Supple, without masses, thyroid non-tender  Resp:  No accessory muscle use, bilateral breath sounds without wheezes rales or rhonchi  Card:  No murmurs, normal S1, S2 without thrills, bruits or peripheral edema  Abd:  Soft, non-tender, non-distended, reduced bowel sounds are present, no mass  Lymph:  No cervical or inguinal adenopathy  Musc:  No cyanosis or clubbing  Skin:  No rashes or ulcers, skin turgor is good  Neuro:  Cranial nerves are grossly intact, general severe motor weakness, does not follows commands appropriately  Psych:  Somnolent    Patient Instructions:   Current Discharge Medication List      CONTINUE these medications which have NOT CHANGED    Details   digoxin (LANOXIN) 0.25 mg tablet Take 0.25 mg by mouth daily. carbidopa-levodopa (SINEMET)  mg per tablet Take 1.5 Tablets by mouth four (4) times daily. STOP taking these medications       furosemide (LASIX) 40 mg tablet Comments:   Reason for Stopping:         acetaminophen (Tylenol Extra Strength) 500 mg tablet Comments:   Reason for Stopping:         apixaban (Eliquis) 5 mg tablet Comments:   Reason for Stopping:         midodrine (PROAMATINE) 2.5 mg tablet Comments:   Reason for Stopping:             Activity: Activity as tolerated  Diet: Comfort feeding  Wound Care: None needed    Follow-up with your PCP and hospice in a few days.   Follow-up tests/labs - none    Signed:  Brian Tomas MD  4/9/2022  8:07 AM

## 2022-04-08 NOTE — PROGRESS NOTES
Palliative Medicine Social Work Note      SW spoke briefly with wife at bedside. Pt was resting quietly in bed, no signs of distress noted. Wife stated she will be meeting with [address] [place] [address] liaison at 3pm today, indicated that plan will be for pt to return home with hospice, inquired about a hospital bed. SW reassured wife that hospice would arrange for delivery of necessary DME. SW will continue to follow for support. Addendum:  Checked back on pt and wife mid-morning along with Palliative Medicine Physician Dr. Stefania Bui. Pt was poorly responsive, wife shared that pt tends to sleep a good deal during the day but was more alert by this time yesterday. Pt was recently started on Klonopin at bedtime; dose was reduced by Dr. Stefania Bui today. SW updated Hospice liaison re: above. Will continue to follow. Thank you for including Palliative Medicine in the care of Mr. Zoila Osorio.      [address] [facility]  Palliative Medicine:  [phone])

## 2022-04-08 NOTE — PROGRESS NOTES
[facility]    Medical Progress Note      NAME: Carlos Rain   :  1935  MRM:  [id number]    Date/Time of service 2022  12:04 PM          Assessment and Plan:     Acute metabolic encephalopathy / Unresponsiveness / Syncope - POA due to IVVD, medications and underlying illnesses. EEG bland. MRI with volume loss,  severe microvascular ischemic disease, encephalomalacia in the left basal ganglia. No AED indicated.      Chronic atrial fibrillation - Rate controlled on digoxin.  Recent ECHO with EF 60%. Stop Eliquis on hospice          Parkinson disease - Continue Sinemet for comfort. Worse than baseline. Fall precautions. No value for PT OT eval.      CHF (congestive heart failure), NYHA class I, chronic, diastolic / Pleural effusion, bilateral - CHF POA and Compensated. Not on diuretic due to orthostatic hypotension.  Has R pleural fluid drain for comfort     Dementia / Hx of completed stroke / FTT in adult - Supportive care. He will enter hospice     Anemia - POA due to chronic disease. Recent serologies unremarkable. Chronic respiratory failure with hypoxia - POA, driven by atelectasis and CHF. Hospice to provide Oxygen as needed.     Orthostatic hypotension - No longer on midodrine         Subjective:     Chief Complaint:  Comfortable, no interview, wife at bedside    ROS:  (bold if positive, if negative)    Not Tolerating PT  Not Tolerating Diet        Objective:     Last 24hrs VS reviewed since prior progress note.  Most recent are:    Visit Vitals  BP (!) (P) 146/53 (BP 1 Location: Left upper arm, BP Patient Position: At rest)   Pulse (P) 61   Temp (P) 98.3 °F (36.8 °C)   Resp (P) 22   Ht 5' 11\" (1.803 m)   Wt 70.6 kg (155 lb 10.3 oz)   SpO2 (P) 95%   BMI 21.71 kg/m²     SpO2 Readings from Last 6 Encounters:   22 (P) 95%   22 95%   21 99%    O2 Flow Rate (L/min): 2 l/min       Intake/Output Summary (Last 24 hours) at 2022 1204  Last data filed at 2022 1952  Gross per 24 hour   Intake 10 ml   Output 0 ml   Net 10 ml        Physical Exam:    Gen:  Frail, thin, ill appearing, in no acute distress  HEENT:  Pink conjunctivae, PERRL, hearing intact to voice, moist mucous membranes  Neck:  Supple, without masses, thyroid non-tender  Resp:  No accessory muscle use, bilateral breath sounds without wheezes rales or rhonchi  Card:  No murmurs, normal S1, S2 without thrills, bruits or peripheral edema  Abd:  Soft, non-tender, non-distended, reduced bowel sounds are present, no mass  Lymph:  No cervical or inguinal adenopathy  Musc:  No cyanosis or clubbing  Skin:  No rashes or ulcers, skin turgor is good  Neuro:  Cranial nerves are grossly intact, general severe motor weakness, does not follows commands appropriately  Psych:  Somnolent    Telemetry reviewed:   AFIB  __________________________________________________________________  Medications Reviewed: (see below)  Medications:     Current Facility-Administered Medications   Medication Dose Route Frequency    clonazePAM (KlonoPIN) tablet 0.25 mg  0.25 mg Oral QHS    apixaban (ELIQUIS) tablet 5 mg  5 mg Oral BID    carbidopa-levodopa (SINEMET)  mg per tablet 1.5 Tablet  1.5 Tablet Oral QID    digoxin (LANOXIN) tablet 0.25 mg  0.25 mg Oral DAILY    [Held by provider] midodrine (PROAMATINE) tablet 2.5 mg  2.5 mg Oral TID    sodium chloride (NS) flush 5-40 mL  5-40 mL IntraVENous Q8H    sodium chloride (NS) flush 5-40 mL  5-40 mL IntraVENous PRN    acetaminophen (TYLENOL) tablet 650 mg  650 mg Oral Q6H PRN    Or    acetaminophen (TYLENOL) suppository 650 mg  650 mg Rectal Q6H PRN    polyethylene glycol (MIRALAX) packet 17 g  17 g Oral DAILY PRN    ondansetron (ZOFRAN ODT) tablet 4 mg  4 mg Oral Q8H PRN    Or    ondansetron (ZOFRAN) injection 4 mg  4 mg IntraVENous Q6H PRN    hydrALAZINE (APRESOLINE) 20 mg/mL injection 20 mg  20 mg IntraVENous Q6H PRN        Lab Data Reviewed: (see below)  Lab Review: Recent Labs     04/07/22  0234 04/06/22  0235   WBC 5.4 6.2   HGB 9.0* 9.3*   HCT 30.5* 31.6*    230     Recent Labs     04/07/22 0234 04/06/22  0235    140   K 4.0 3.7   CL 97 94*   CO2 39* 42*   * 105*   BUN 23* 26*   CREA 0.98 1.01   CA 8.7 8.7     No results found for: GLUCPOC  No results for input(s): PH, PCO2, PO2, HCO3, FIO2 in the last 72 hours. No results for input(s): INR, INREXT in the last 72 hours. All Micro Results     Procedure Component Value Units Date/Time    URINE CULTURE HOLD SAMPLE [[id number]] Collected: 04/04/22 0445    Order Status: Completed Specimen: Urine from Serum Updated: 04/04/22 0458     Urine culture hold       Urine on hold in Microbiology dept for 2 days. If unpreserved urine is submitted, it cannot be used for addtional testing after 24 hours, recollection will be required. Other pertinent lab: none    Total time spent with patient: 30 Minutes I personally reviewed chart, notes, data and current medications in the medical record. I have personally examined and treated the patient at bedside during this period.                  Care Plan discussed with: Patient, Family, Care Manager, Nursing Staff and >50% of time spent in counseling and coordination of care    Discussed:  Care Plan and D/C Planning    Prophylaxis:  H2B/PPI    Disposition:  hospice           ___________________________________________________    Attending Physician: Chaka Goetz MD

## 2022-04-08 NOTE — DISCHARGE INSTRUCTIONS
Patient Discharge Instructions    Evin Han / [id number] : 1935    Admitted 2022 Discharged: 2022     Primary Diagnoses  Problem List as of 2022 Date Reviewed: 2022           Acute encephalopathy   Seizure (HCC)   Pleural effusion, bilateral   Unresponsiveness   Atrial fibrillation with RVR (HCC)   CHF (congestive heart failure), NYHA class I, chronic, diastolic (HCC)   Hx of completed stroke   Orthostatic hypotension   Parkinson disease ([facility] [address]   Weakness generalized   Anemia   Hypoalbuminemia   Proteinuria   CKD (chronic kidney disease), stage III (HCC)   Chronic a-fib ([facility] [address]          Take Home Medications     · It is important that you take the medication exactly as they are prescribed. · Keep your medication in the bottles provided by the pharmacist and keep a list of the medication names, dosages, and times to be taken in your wallet. · Do not take other medications without consulting your doctor. What to do at [address]: Comfort feeding    Recommended activity: Activity as tolerated    If you experience worse symptoms, please follow up with hospice. Follow-up with your PCP in a few weeks    Follow-up Information     Follow up With Specialties Details Why Contact Info    Chad Castro MD Family Medicine Schedule an appointment as soon as possible for a visit in 1 week  [address].  [phone]             Information obtained by :  I understand that if any problems occur once I am at home I am to contact my physician. I understand and acknowledge receipt of the instructions indicated above.                                                                                                                                            Physician's or R.N.'s Signature                                                                  Date/Time Patient or Representative Signature                                                          Date/Time

## 2022-04-08 NOTE — PROGRESS NOTES
04/08/22    Medicare pt has received, reviewed, and signed 2nd IM letter informing them of their right to appeal the discharge. Signed copied has been placed on pt bedside chart.

## 2022-04-08 NOTE — PROGRESS NOTES
Palliative Medicine Consult  Raffaele: [phone])    Patient Name: Dragan Russell  YOB: 1935    Date of Initial Consult: 4/5/22  Reason for Consult: Care decisions   Requesting Provider: Lainey Griffin   Primary Care Physician: Hilda Nelson MD     SUMMARY:   Dragan Russell is a 81 y.o. with a past history of a fib on eliquis, diastolic CHF w/ hx of pleural effusions w/ pleural drain on R, CVA, Parkinson's disease, dementia, orthostatic hypotension who was admitted on 4/4/2022 from home after wife found him unresponsive and shaking. Pulm eval- has acute on chronic respiratory failure- mult thoracnteses required b/l since 12/2021 and R pleurx drain placed last month. Neuro eval- EEG w/ generalized slowing. MRI brain w/out acute findings. Did fall out of bed last night, CT head stable. On 4/6 had episode after standing up to get to commode- shaking, unresponsive. Current medical issues leading to Palliative Medicine involvement include: care decisions. Social: Pt  to supportive wife Silver Form. They have one child- son Donald House who is local (and his wife Antonio Tinajero). She has caretakers to help at home. PALLIATIVE DIAGNOSES:   1. Parkinson's disease   2. Cognitive deficits - mental status waxes/wanes  3. Shortness of breath - with recurrent pleural effusions   4. Poor appetite  5. Increased falls        PLAN:   1. Along w/ Laquita LCSW meet w/ pt and wife Silver Form- she has concerns that since the clonazepam started by neurology 2 days ago pt has been very lethargic in the morning. When we see, pt unable to take AM meds. 2. To decr clonazepam dose from 0.5mg bedtime to 0.25mg. 3. Plan most likely for hospice- hopefully can get home by tomorrow. 4. Concern for caregiver distress- LCSW and hospice team aware.   5. Communicated plan of care with: Palliative [place]bie [place] [phone] [facility] care management ,[facility] hospice     GOALS OF CARE / TREATMENT PREFERENCES: GOALS OF CARE:  Patient/Health Care Proxy Stated Goals: Rehabilitation    TREATMENT PREFERENCES:   Code Status: DNR    Patient and family's personal goals include: to live as well as he can       Advance Care Planning:  [x] [facility] Interdisciplinary Team has updated the ACP Navigator with Health Care Decision Maker and Patient Capacity      Primary Decision Maker: Lynette Quintero - [phone]    Secondary Decision Maker: Galindo Cardoza - [phone]  Advance Care Planning 4/7/2022   Patient's Healthcare Decision Maker is: Legal Next of Kin   Confirm Advance Directive None   Patient Would Like to Complete Advance Directive Unable   Does the patient have other document types Do Not Resuscitate       Medical Interventions: Limited additional interventions       Other:    As far as possible, the palliative care team has discussed with patient / health care proxy about goals of care / treatment preferences for patient. HISTORY:     History obtained from: chart, family, pt, staff     CHIEF COMPLAINT: Cannot obtain due to patient factors      HPI/SUBJECTIVE:    The patient is:   [x] Verbal and participatory  [] Non-participatory due to:     Pt in bed, less interactive.      Clinical Pain Assessment (nonverbal scale for severity on nonverbal patients):   Clinical Pain Assessment  Severity: 0          Duration: for how long has pt been experiencing pain (e.g., 2 days, 1 month, years)  Frequency: how often pain is an issue (e.g., several times per day, once every few days, constant)     FUNCTIONAL ASSESSMENT:     Palliative Performance Scale (PPS):  PPS: 50       PSYCHOSOCIAL/SPIRITUAL SCREENING:     Palliative IDT has assessed this patient for cultural preferences / practices and a referral made as appropriate to needs (Cultural Services, Patient Advocacy, Ethics, etc.)    Any spiritual / Evangelical concerns:  [] Yes /  [x] No   If \"Yes\" to discuss with pastoral care during IDT     Does caregiver feel burdened by caring for their loved one:   [x] Yes /  [] No /  [] No Caregiver Present    If \"Yes\" to discuss with social work during IDT    Anticipatory grief assessment:   [x] Normal  / [] Maladaptive     If \"Maladaptive\" to discuss with social work during IDT    ESAS Anxiety:      ESAS Depression:       Cannot obtain due to patient factors     REVIEW OF SYSTEMS:     Positive and pertinent negative findings in ROS are noted above in HPI. The following systems were [x] reviewed / [] unable to be reviewed as noted in HPI  Other findings are noted below. Systems: constitutional, ears/nose/mouth/throat, respiratory, gastrointestinal, genitourinary, musculoskeletal, integumentary, neurologic, psychiatric, endocrine. Positive findings noted below. Modified ESAS Completed by: provider   Fatigue: 8 Drowsiness: 4     Pain: 0           Dyspnea: 0           Stool Occurrence(s): 0        PHYSICAL EXAM:     From RN flowsheet:  Wt Readings from Last 3 Encounters:   04/07/22 155 lb 10.3 oz (70.6 kg)   02/11/22 151 lb (68.5 kg)   12/23/21 163 lb 2.3 oz (74 kg)     Blood pressure (!) 145/63, pulse (!) 53, temperature 98 °F (36.7 °C), resp. rate 22, height 5' 11\" (1.803 m), weight 155 lb 10.3 oz (70.6 kg), SpO2 96 %. Pain Scale 1: Numeric (0 - 10)  Pain Intensity 1: 0                   Constitutional: resting, earlier drank some milk, now cannot arouse  Eyes: pupils equal, anicteric  ENMT: no nasal discharge, moist mucous membranes  Respiratory: breathing not labored on NC  Musculoskeletal: no deformity, no tenderness to palpation  Skin: warm, dry  Neurologic:resting        HISTORY:     Active Problems:    CHF (congestive heart failure), NYHA class I, chronic, diastolic (HCC) ()      Overview: has had to have lungs drained.       Hx of completed stroke ()      Overview: right sided affected      Orthostatic hypotension ()      Parkinson disease (HCC) ()      Chronic a-fib (HCC) ()      Acute encephalopathy (4/4/2022) Seizure ([facility] [address] (4/4/2022)      Pleural effusion, bilateral (4/4/2022)      Unresponsiveness (4/4/2022)      Past Medical History:   Diagnosis Date    Anemia     Atrial fibrillation (HCC)     CHF (congestive heart failure), NYHA class I, chronic, diastolic (HCC)     has had to have lungs drained.  CKD (chronic kidney disease), stage III ([facility] [address]     Hx of completed stroke     right sided affected    Hypoalbuminemia     Orthostatic hypotension     Parkinson disease (HCC)     Proteinuria       Past Surgical History:   Procedure Laterality Date    HX CHOLECYSTECTOMY      HX KNEE ARTHROSCOPY Left       No family history on file. History reviewed, no pertinent family history.   Social History     Tobacco Use    Smoking status: Not on file    Smokeless tobacco: Not on file   Substance Use Topics    Alcohol use: Not on file     No Known Allergies   Current Facility-Administered Medications   Medication Dose Route Frequency    clonazePAM (KlonoPIN) tablet 0.25 mg  0.25 mg Oral QHS    apixaban (ELIQUIS) tablet 5 mg  5 mg Oral BID    carbidopa-levodopa (SINEMET)  mg per tablet 1.5 Tablet  1.5 Tablet Oral QID    digoxin (LANOXIN) tablet 0.25 mg  0.25 mg Oral DAILY    [Held by provider] midodrine (PROAMATINE) tablet 2.5 mg  2.5 mg Oral TID    sodium chloride (NS) flush 5-40 mL  5-40 mL IntraVENous Q8H    sodium chloride (NS) flush 5-40 mL  5-40 mL IntraVENous PRN    acetaminophen (TYLENOL) tablet 650 mg  650 mg Oral Q6H PRN    Or    acetaminophen (TYLENOL) suppository 650 mg  650 mg Rectal Q6H PRN    polyethylene glycol (MIRALAX) packet 17 g  17 g Oral DAILY PRN    ondansetron (ZOFRAN ODT) tablet 4 mg  4 mg Oral Q8H PRN    Or    ondansetron (ZOFRAN) injection 4 mg  4 mg IntraVENous Q6H PRN    hydrALAZINE (APRESOLINE) 20 mg/mL injection 20 mg  20 mg IntraVENous Q6H PRN          LAB AND IMAGING FINDINGS:     Lab Results   Component Value Date/Time    WBC 5.4 04/07/2022 02:34 AM    HGB 9.0 (L) 04/07/2022 02:34 AM    PLATELET 035 67/80/7600 02:34 AM     Lab Results   Component Value Date/Time    Sodium 137 04/07/2022 02:34 AM    Potassium 4.0 04/07/2022 02:34 AM    Chloride 97 04/07/2022 02:34 AM    CO2 39 (H) 04/07/2022 02:34 AM    BUN 23 (H) 04/07/2022 02:34 AM    Creatinine 0.98 04/07/2022 02:34 AM    Calcium 8.7 04/07/2022 02:34 AM    Magnesium 2.4 04/05/2022 04:06 AM    Phosphorus 2.7 04/05/2022 04:06 AM      Lab Results   Component Value Date/Time    Alk. phosphatase 65 04/04/2022 03:31 AM    Protein, total 8.0 04/04/2022 03:31 AM    Albumin 2.7 (L) 04/04/2022 03:31 AM    Globulin 5.3 (H) 04/04/2022 03:31 AM     Lab Results   Component Value Date/Time    INR 1.1 02/06/2022 08:47 PM    Prothrombin time 11.8 (H) 02/06/2022 08:47 PM    aPTT 32.7 (H) 02/06/2022 08:47 PM      Lab Results   Component Value Date/Time    Iron 23 (L) 12/21/2021 04:12 AM    TIBC 207 (L) 12/21/2021 04:12 AM    Iron % saturation 11 (L) 12/21/2021 04:12 AM    Ferritin 340 02/06/2022 08:47 PM      Lab Results   Component Value Date/Time    pH 7.38 04/04/2022 07:12 AM    PCO2 62 (H) 04/04/2022 07:12 AM    PO2 123 (H) 04/04/2022 07:12 AM     No components found for: GLPOC   No results found for: CPK, CKMB             Total time:25  min   Counseling / coordination time, spent as noted above: 20 min   > 50% counseling / coordination?: yes    Prolonged service was provided for  []30 min   []75 min in face to face time in the presence of the patient, spent as noted above. Time Start:   Time End:   Note: this can only be billed with 03449 (initial) or 12299 (follow up). If multiple start / stop times, list each separately.

## 2022-04-08 NOTE — PROGRESS NOTES
0730: Bedside and Verbal shift change report given to GIANNA Mathews (oncoming nurse) by 6 [facility]. O (offgoing nurse).  Report included the following information SBAR, Kardex, Intake/Output, MAR and Recent Results

## 2022-04-08 NOTE — HOSPICE
[facility]  [phone]    Hospice Nursing PRE-Admission   Discharge Summary  Patient Name: Kieran Chowdhury  YOB: 1935  Age: 81 y.o. Date of PLANNED Hospice Admission: 22 PM  Hospice Attending: Dr. Rl Sykes  Primary Care Physician: Bi Segovia MD     Home Hospice [address] [address]    Primary Contact and Phone: Femi Sharma [phone]      ADVANCE CARE PLANNING    Code Status: DNR  Durable DNR: [x]  Yes  []  No  Advance Care Planning 2022   Patient's Healthcare Decision Maker is: Legal Next of Kin   Confirm Advance Directive None   Patient Would Like to Complete Advance Directive Unable   Does the patient have other document types Do Not Resuscitate       Restorationist: NO Voodoo   Home: TBD    HOSPICE SUMMARY     Verbal CTI of terminal diagnosis with life expectancy of 6 months or less received from: Dr Suresh Harrison    For the Hospice Diagnosis of: Parkinson's Disease    NCD: Declined      CLINICAL INFORMATION   Allergies: No Known Allergies      Currently this patient has:  [x] Supplemental O2   [x] R Pleur-x        COVID Screening:  Negative 22      ASSESSMENT & PLAN     1. Symptom Issues Identified: asymptomatic, lethargic, R pleurx in place     2. Spiritual Issues Identified:none at this time     3. Psych/ Social/ Emotional Issues Identified: wife is primary caregiver, has hired aides 5x/week through [facility], Son and Silva Kohli live locally and provide support to wife      CARE COORDINATION     1. Hospice Consents: signed by wife at bedside    2. DME Ordered/Company/Delivery Plan: bed w/ gel overlay, OBT, O2 set up, ordered through [employer] for Sat AM delivery    3. Symptom Kit and other Medications Needs: SRK to be couriered to patient's home 04 am, on 0.25mg Klonopin at bedtime    4. Home Admission Reservation: Sat PM    5.     Transportation by: [facility]   Scheduled for 2pm 6.    Verbal Report/Handoff given to: sent to admission team    7. Phone number to [facility] [phone]    8.  Supplies/Wound Care: Pleur-x supplies (in the home), urinal, chux, briefs

## 2022-04-08 NOTE — PROGRESS NOTES
0700  Bedside and Verbal shift change report given to Katelynn Ruiz RN (oncoming nurse) by Kvng Elizondo RN (offgoing nurse). Report included the following information SBAR, Kardex, Procedure Summary, Intake/Output, MAR, Recent Results, and Med Rec Status. 1900  Bedside and Verbal shift change report given to Regina Conner RN (oncoming nurse) by Katelynn Ruiz RN (offgoing nurse). Report included the following information SBAR, Kardex, Procedure Summary, Intake/Output, MAR, Recent Results, and Med Rec Status.

## 2022-04-09 NOTE — PROGRESS NOTES
0700: Bedside and Verbal shift change report given to Aren Mittal (oncoming nurse) by Wilian Rodriguez RN (offgoing nurse). Report included the following information SBAR, Kardex and Accordion. 0900: Notified MD of patient's lack of responsiveness. MD gave verbal orders to hold PO meds. I have reviewed discharge instructions with the caregiver. The caregiver verbalized understanding. Patient is discharged     This patient was assisted with Intentional Toileting every 2 hours during this shift as appropriate. Documentation of ambulation and output reflected on Flowsheet as appropriate. Purposeful hourly rounding was completed using AIDET and 5Ps. Outcomes of PHR documented as they occurred. Bed alarm in use as appropriate. Dual Suction and ambubag in place.

## 2022-04-09 NOTE — ROUTINE PROCESS
Bedside shift change report given to Meghann Evans RN  (oncoming nurse) by Rosmery Mendoza RN  (offgoing nurse).  Report included the following information SBAR, Kardex, MAR, Recent Results and Med Rec Status.

## 2022-04-09 NOTE — PROGRESS NOTES
4/9/2022   1:34 PM  CM notified by Carina Lira that hospital bed has been delivered to pt's residence. Care Management Progress Note      ICD-10-CM ICD-9-CM    1. Unresponsive episode  R41.89 780.09    2. Elevated troponin  R77.8 790.6    3. Abnormal EKG  R94.31 794.31    4. Syncope and collapse  R55 780.2    5. Orthostatic hypotension  I95.1 458.0    6. Parkinson disease ([address]  G20 332.0    7. Acute metabolic encephalopathy  N07.91 348.31    8. History of CVA (cerebrovascular accident)  Z86.73 V12.54    9. Acute encephalopathy  G93.40 348.30    10. Shortness of breath  R06.02 786.05    11. Poor appetite  R63.0 783.0    12. Goals of care, counseling/discussion  Z71.89 V65.49    13. Palliative care by specialist  Z51.5 V66.7    14. Convulsive syncope  R55 780.2      780.39    15. Periodic limb movements of sleep  G47.61 327.51    16. Somnolence  R40.0 780.09    17. Syncope, unspecified syncope type  R55 780.2        RUR:  17%  Risk Level: []Low [x]Moderate []High  Value-based purchasing: [x] Yes [] No  Bundle patient: [] Yes [x] No   Specify:     Transition of care plan:  1. Discharge ordered  2. Home with [facility]  3. Outpatient follow-up. 4. [facility] transport scheduled for 14:00  5. No further CM needs identified    Care Management Interventions  PCP Verified by CM: Yes (Ed Fontaine MD)  Mode of Transport at Discharge: Other (see comment) (TBD- family vs w/c Zia Gutierrez)  Transition of Care Consult (CM Consult): Discharge Planning  MyChart Signup: No  Discharge Durable Medical Equipment: No  Physical Therapy Consult: No  Occupational Therapy Consult: No  Speech Therapy Consult: No  Support Systems: Spouse/Significant Other  Confirm Follow Up Transport: Family  Discharge Location  Patient Expects to be Discharged to[de-identified] Home with hospice    11:56 AM  CM notified by GIANNA Chaidez that pt's hospital bed has not yet been delivered at residence.   CM called and spoke with [facility], Carina Lira, who stated she would look into this situation and ensure it's taken care of prior to patient discharging.     Ming Holguin RN

## 2022-04-09 NOTE — PROGRESS NOTES
Attempted a follow up visit with patient who was sleeping and did not awake. Pt's chart was consulted.   Chaplain Oquendo MDiv, MS, [facility]

## 2022-04-09 NOTE — HOSPICE
Call from pt's BASIM Garland Ran that DME has not been delivered. Call made to Donavon Vazquez regarding order #[id number]. Dispatcher could not reach  but says order will be delivered next. Person receiving delivery is OLIVER Finn ([phone]). Will let family know.     Darel Leventhal, RN

## 2022-04-09 NOTE — PROGRESS NOTES
[facility]    Medical Progress Note      NAME: Lan Thomas   :  1935  MRM:  [id number]    Date/Time of service 2022  9:15 AM          Assessment and Plan:     Acute metabolic encephalopathy / Unresponsiveness / Syncope - POA due to IVVD, medications and underlying illnesses. EEG bland. MRI with volume loss, severe microvascular ischemic disease, encephalomalacia in the left basal ganglia. No AED indicated.      Chronic atrial fibrillation - Rate controlled on digoxin.  Recent ECHO with EF 60%. Stop Eliquis on hospice          Parkinson disease - Continue Sinemet for comfort. Worse than baseline. Fall precautions. No value for PT OT eval.      CHF (congestive heart failure), NYHA class I, chronic, diastolic / Pleural effusion, bilateral - CHF POA and Compensated. Not on diuretic due to orthostatic hypotension.  Has R pleural fluid drain for comfort     Dementia / Hx of completed stroke / FTT in adult - Supportive care. He will enter hospice     Anemia - POA due to chronic disease. Recent serologies unremarkable. Chronic respiratory failure with hypoxia - POA, driven by atelectasis and CHF. Hospice to provide Oxygen as needed.     Orthostatic hypotension - No longer on midodrine         Subjective:     Chief Complaint:  Comfortable, no interview, no one at bedside    ROS:  (bold if positive, if negative)    Not Tolerating PT  Not Tolerating Diet        Objective:     Last 24hrs VS reviewed since prior progress note.  Most recent are:    Visit Vitals  BP (!) 160/68 (BP 1 Location: Left upper arm, BP Patient Position: At rest)   Pulse 69   Temp 98.1 °F (36.7 °C)   Resp 16   Ht 5' 11\" (1.803 m)   Wt 71.3 kg (157 lb 3 oz)   SpO2 98%   BMI 21.92 kg/m²     SpO2 Readings from Last 6 Encounters:   22 98%   22 95%   21 99%    O2 Flow Rate (L/min): 3 l/min       Intake/Output Summary (Last 24 hours) at 2022 08  Last data filed at 2022  Gross per 24 hour   Intake 120 ml   Output    Net 120 ml        Physical Exam:    Gen:  Frail, thin, ill appearing, in no acute distress  HEENT:  Pink conjunctivae, PERRL, hearing intact to voice, moist mucous membranes  Neck:  Supple, without masses, thyroid non-tender  Resp:  No accessory muscle use, bilateral breath sounds without wheezes rales or rhonchi  Card:  No murmurs, normal S1, S2 without thrills, bruits or peripheral edema  Abd:  Soft, non-tender, non-distended, reduced bowel sounds are present, no mass  Lymph:  No cervical or inguinal adenopathy  Musc:  No cyanosis or clubbing  Skin:  No rashes or ulcers, skin turgor is good  Neuro:  Cranial nerves are grossly intact, general severe motor weakness, does not follows commands appropriately  Psych:  Somnolent    Telemetry reviewed:   AFIB  __________________________________________________________________  Medications Reviewed: (see below)  Medications:     Current Facility-Administered Medications   Medication Dose Route Frequency    clonazePAM (KlonoPIN) tablet 0.25 mg  0.25 mg Oral QHS    apixaban (ELIQUIS) tablet 5 mg  5 mg Oral BID    carbidopa-levodopa (SINEMET)  mg per tablet 1.5 Tablet  1.5 Tablet Oral QID    digoxin (LANOXIN) tablet 0.25 mg  0.25 mg Oral DAILY    [Held by provider] midodrine (PROAMATINE) tablet 2.5 mg  2.5 mg Oral TID    sodium chloride (NS) flush 5-40 mL  5-40 mL IntraVENous Q8H    sodium chloride (NS) flush 5-40 mL  5-40 mL IntraVENous PRN    acetaminophen (TYLENOL) tablet 650 mg  650 mg Oral Q6H PRN    Or    acetaminophen (TYLENOL) suppository 650 mg  650 mg Rectal Q6H PRN    polyethylene glycol (MIRALAX) packet 17 g  17 g Oral DAILY PRN    ondansetron (ZOFRAN ODT) tablet 4 mg  4 mg Oral Q8H PRN    Or    ondansetron (ZOFRAN) injection 4 mg  4 mg IntraVENous Q6H PRN    hydrALAZINE (APRESOLINE) 20 mg/mL injection 20 mg  20 mg IntraVENous Q6H PRN        Lab Data Reviewed: (see below)  Lab Review:     Recent Labs     04/07/22  0234   WBC 5. 4   HGB 9.0*   HCT 30.5*        Recent Labs     04/07/22  0234      K 4.0   CL 97   CO2 39*   *   BUN 23*   CREA 0.98   CA 8.7     No results found for: GLUCPOC  No results for input(s): PH, PCO2, PO2, HCO3, FIO2 in the last 72 hours. No results for input(s): INR, INREXT, INREXT in the last 72 hours. All Micro Results     Procedure Component Value Units Date/Time    URINE CULTURE HOLD SAMPLE [[id number]] Collected: 04/04/22 0445    Order Status: Completed Specimen: Urine from Serum Updated: 04/04/22 0458     Urine culture hold       Urine on hold in Microbiology dept for 2 days. If unpreserved urine is submitted, it cannot be used for addtional testing after 24 hours, recollection will be required. Other pertinent lab: none    Total time spent with patient: 30 Minutes I personally reviewed chart, notes, data and current medications in the medical record. I have personally examined and treated the patient at bedside during this period.                  Care Plan discussed with: Patient, Family, Care Manager, Nursing Staff and >50% of time spent in counseling and coordination of care    Discussed:  Care Plan and D/C Planning    Prophylaxis:  H2B/PPI    Disposition:  hospice           ___________________________________________________    Attending Physician: April Marrero MD

## 2022-04-10 NOTE — HOSPICE
[address] [facility]  Good Help to Those in Need  [phone]  Patient Name: Caesar Hicks  YOB: 1935  Age: 81 yoM                                                         Principle Hospice Diagnosis: Parkinson's Disease  Diagnoses RELATED to the terminal prognosis: Acute metabolic encephalopathy, altered level of consciousness, chronic respiratory failure, severe microvascular disease, encephalomalacia in left basal ganglia. Unrelated Diagnosis: Anemia, chronic atrial fibrillation, cerebrovascular accident, congestive heart failure (preserved EF 60 percent), dementia, orthostatic hypotension. Date of Hospice Admission: 2022  Hospice Attending Elected by Patient: Massiel Andino MD   Primary Care Physician: Yessy Pickens MD    Admitting RN: Ethan Saenz RN  Nurse CM:  Jose Manuel Collazo RN  : NATALIIA Escobar  :  Marissa Pretty, [address] [place] [address] DNR: Yes, to be scanned into chart still   Service: No       Home:  TBD    Direct Observation:  The patient is a 81 yoM with principle hospice diagnosis of Parkinson's disease. The patient has co-morbidities of acute metabolic encephalopathy, altered level of consciousness, chronic respiratory failure, severe microvascular disease, encephalomalacia to L basal ganglia, pmhx AFIB, CVA, HFpEF, orthostatic hypotension.   On assessment the patient's appearance is frail/cachectic/muscle atrophy, lethargic, arouses to voice/light touch, limited vocabulary and gestures with head for most questions, able to follow some simple commands and lifts arm up when directed, does not appear in respiratory distress, spO2 90 percent on 2L oxygen by NC, lungs are clear and diminished in bases bilaterally, holosystolic murmur, HR is irregular and ranges 50-60s, no edema noted, ABD is flat/soft/non-TTP, hypoactive bowel sounds, LBM was yesterday, no s/sx's nausea/vomiting noted, PO intake waxes and wanes, intermittent urinary/fecal incontinence, skin is intact, aspira pleural drain in place with dressing C/D/I, dementia pain score 0/10 and denies pain when directly asked by nodding no.   The patient is non-ambulatory, requires assistance for all ADLs. PPS 30 percent. ER Visits/ Hospitalizations in past year: 3  Onset Date of Hospice Diagnosis:  4/2022  Summary of Disease Progression Leading to Hospice Diagnosis:  Excerpted from Dr Cristian Templeton HPI dtd 4/8/22 Juan Dunbar is a 81 y.o. with a past history of a fib on eliquis, diastolic CHF w/ hx of pleural effusions w/ pleural drain on R, CVA, Parkinson's disease, dementia, orthostatic hypotension who was admitted on 4/4/2022 from home after wife found him unresponsive and shaking. Pulm eval- has acute on chronic respiratory failure- mult thoracnteses required b/l since 12/2021 and R pleurx drain placed last month. Neuro eval- EEG w/ generalized slowing. MRI brain w/out acute findings. Did fall out of bed last night, CT head stable. On 4/6 had episode after standing up to get to commode- shaking, unresponsive. Current medical issues leading to Palliative Medicine involvement include: care decisions. Social: Pt  to supportive wife Garrison Butler. They have one child- son Ottoniel Billingsley who is local (and his wife Katie Finn). She has caretakers to help at home.  Labs/Diagnostics: Albumin: 2.7 Protein, total: 8.0 TSH-WNL NT pro-BNP: 7,007 Arterial Blood Gas: pH 7.38, PCO2 62, PO2 123, HCO3 36, Base excess 7.9, 2L supplemental oxygen. MRI-BRAIN: IMPRESSION 1. No acute intracranial abnormality. 2. Generalized parenchymal volume loss and severe chronic microvascular ischemic disease. Chronic hemorrhage with encephalomalacia in the left basal ganglia. Use LCD Guidelines and list features:   Patients will be considered to be in the terminal stage of dementia (life expectancy of six months or less) if they meet the following criteria.  Patients with dementia should show all the following characteristics:  ________  1. Stage seven or beyond according to the Functional Assessment Staging Scale;  ____x____  2. Unable to ambulate without assistance;  ____x___  3. Unable to dress without assistance;  ____x___  4. Unable to bathe without assistance;  ____x___  5. Urinary and fecal incontinence, intermittent or constant;  ____x___  6. No consistently meaningful verbal communication: stereotypical phrases only or the ability             to speak is limited to six or fewer intelligible words. Patients should have had one of the following within the past 12 months:  ________  1. Aspiration pneumonia;  ________  2. Pyelonephritis or other upper urinary tract infection;  ________  3. Septicemia;  ________  4. Decubitus ulcers, multiple, stage 3-4;  ________  5. Fever, recurrent after antibiotics;  ____x___  6. Inability to maintain sufficient fluid and calorie intake with 10% weight loss during the            previous six months or serum albumin Note: This section is specific for Alzheimer's Disease            and related disorders, and is not appropriate for other types of dementia, such as multi-           infarct dementia. SPIRITUAL/Social/Emotional/psych: Declines  support. Dr. Oj Jaime MD  contacted, agrees to serve as attending provider for hospice and provided verbal certification of terminal illness with prognosis of 6 months or less life expectancy. Orders for hospice admission, medications and plan of treatment received. Medication reconciliation completed. Currently this patient has:  Supplemental O2: Yes  Peripheral IV:           Fistula:   PICC:                        PORT:           George Catheter:   NG Tube:   PEG Tube:                Ostomy:        AICD: Has ICD been deactivated?   Yes:_____   No:_____   (If no, please identify ex ([place]; [address] [place] [address] etc)      MEDS:  I have reviewed the patient's medication list with MD and identified the following:  Nonformulary medications: digoxin  Unrelated medications:  n/a    IDT communication to include MD, SN, SW, CH and support team.

## 2022-04-11 NOTE — HOSPICE
LCSW placed initial call to pt's wife, Tita Cruz to introduce self and SW services as part of the hospice team. Wife shared that she has been caring for pt for 4 years since his first stroke, and that he has had increasing needs now finally to the point where in home assistance and hospice are necessary. Pt also began having seizures due to Parkinson's diagnosis. Wife shared that at that point, she could not leave pt for any length of time, even for short errands. She has hired in home assistance of [facility] to assist her with pt's in bed care, and will be receving hospice HHA support twice per week. LCSW reviewed the support available from hospice SW including pre-bereavement counseling, support regarding changes in care/resource planning and planning for final arrangements. Wife felt that she and pt are not quite ready for that type of support, but that she does need assistance of hospice RN. LCSW reassured her that RN would likely be in touch today based on schedule to set up initial visit. Wife shared appreciation for hospice admission RN as he was very helpful with the admission process and answering her questions. LCSW also reviewed benefit of respite care as pt's care needs have increased significantly recently and wife is likely suffering from some caregiver exhaustion. She agreed to keep this in mind and consider respite care in the future. LCSW will continue to follow should needs arise, though in person visits were declined and reach out via phone monthly.

## 2022-04-12 NOTE — HOSPICE
Upon arrival paid caregiver was caring for the patient and Nurse spoke with Suzanne Sheehanwith his spouse of 77 years. She is very supportive of her . Encouraged family to contact [phone] at anytime with questions or concerns.

## 2022-04-13 NOTE — HOSPICE
Patient was calm and cooperative, falling asleep during assessment and draining of Aspira drain. There were less than 5ml of Drainage removed. No longer recommended to drain patient. Drain dressing changed with sterile technique. Discussion and demonstration of oxygen with wife and paid CG. Wife reports she understands. discussion regarding Lorazepam 0.5mg at HS with his agitation reportedly increasing at HS. Demonstration of Liquid Lorazepam and discussion regarding Seizure Protocol. Encouraged family to call [phone] with questions or concerns.

## 2022-04-16 NOTE — HOSPICE
Edmar   1935  Pronouncement of death made by AIDEN Spears at 21 [phone] on 2022  Family grieving appropriately, low bereavement risk. Provided post-mortem care. Medications wasted per protocol, in witness of spouse. Manuel's FH to serve, ETA 90 min. DME scheduled for pickup. Hospice attending notified by email.

## 2022-04-16 NOTE — HOSPICE
PRN visit made r/t patient's decline. Per wife, Pam Spangler, pt had several large BMs in afternoon, then became unresponsive with O2 sats in the 40s-50s. Upon RN's arrival, wife, children and grandchildren present. Pt in bed, minimally responsive to voice, with labored and regular breathing, RR 34, HR 31, O2 sats 53. Minimal secretions; minimal signs of restlessness. Five mg morphine administered by wife 30 min prior to RN arrival. Updated orders received from Dr. Maite Kong for 10mg morphine q1h prn and 1mg lorazepam q1h prn. Increased oxygen from 3.5 to 5L. Administered both meds. Forty minutes later, RR 32, HR 82, O2 sats 88. Administered morphine and lorazepam at one hour gera. Assisted family with repositioning pt. Provided education to wife and family about overnight care, including med administration; repositioning pt r/t secretions; s/s and process of EOL. Pt's secretions increasing; aministered levsin. Instructed wife to focus on pt's symptoms (not pulse ox) and continue RK medication administration prn for comfort. Encouraged wife to call hospice for any questions/concerns. Advised family that nurse will visit Sat. Wife and family verbalized understanding.      ---    Sat SN visit needed   ---  Add pt to 0-7 list   ---    Family has chosen [facility]  in [facility] ([phone]) ---

## 2022-04-17 ENCOUNTER — HOME CARE VISIT (OUTPATIENT)
Dept: HOSPICE | Facility: HOSPICE | Age: 87
End: 2022-04-17
Payer: MEDICARE

## 2022-04-18 ENCOUNTER — HOME CARE VISIT (OUTPATIENT)
Dept: HOSPICE | Facility: HOSPICE | Age: 87
End: 2022-04-18
Payer: MEDICARE

## 2022-04-18 VITALS
TEMPERATURE: 97.2 F | SYSTOLIC BLOOD PRESSURE: 152 MMHG | DIASTOLIC BLOOD PRESSURE: 85 MMHG | RESPIRATION RATE: 14 BRPM | HEART RATE: 56 BPM

## 2022-04-18 NOTE — HOSPICE
Patient's Spouse Rocio Sarah had concerns of a sacral dressing used podiatrically from the hospital. Removed patch and encouraged caregiver to turn him side to side and relieve pressure, float heels and to keep sacral area clean after BM. Encouraged Rocio Sarah to make a list of needs such as questions, supplies and medications for the nursing visits weekly. No supply needs, no medication needs at this time.

## 2023-06-26 NOTE — CONSULTS
[facility]     Name: Mervin Penningotn MRN: [id number]   : 1935 Hospital: [address]   Date: 2022        Impression Plan   1. Acute on chronic respiratory failure  2. Acute on chronic CO2 retention  3. Failure to thrive  4. Encephalopathy  5. Recurrent pleural effusions                 · Patient is severly debilitated with chronic CO2 retention, persistent pleural effusions and multiple other medical problems. I spoke with wife at bedside about the lack of long term and even lack of short term gains by performing a thoracentesis on the left today. The patient has gone down hill in the last 6 months and very rapidly in the last week despite right sided pleurex drainage. Wife is agreeable to talk to palliative about end of life plans  · Wean O2 as tolerated, keeping sats above 90%  · Palliative consult pending  · Will try to drain right pleural drain for comfort. Radiology  ( personally reviewed) CXR reviewed: essentially unchanged pleural effusions compared to CXR one month ago. ABG No results for input(s): PHI, PO2I, PCO2I in the last 72 hours. Subjective     Cc: pleural effusions    80 yo with PMHx of atrial fibrillation and diastolic heart failure presenting with encephalopathy, lethargy and hypoxia. Pulmonary was consulted for bilateral pleural effusions. Pt has had multiple thoracentesis done bilaterally since 2022 and had a tunneled pleural catheter placed on the right in the last month. Wife has difficulties remembering the amounts removed on once weekly drainage, but it sounds like smaller amounts (245-300cc). Pt has acute on chronic CO2 retention on ABG and only responds to noxious stimuli. Wife states that pt has eaten very little in the last few weeks and overall decline in the last 6 months. Pt is a 100% on 3 L. Review of Systems:  Review of systems not obtained due to patient factors.     Past Medical History:   Diagnosis Date    Anemia     Atrial fibrillation (HCC)     CHF (congestive heart failure), NYHA class I, chronic, diastolic (HCC)     has had to have lungs drained.  CKD (chronic kidney disease), stage III ([facility] [address]     Hx of completed stroke     right sided affected    Hypoalbuminemia     Orthostatic hypotension     Parkinson disease (HCC)     Proteinuria       Past Surgical History:   Procedure Laterality Date    HX CHOLECYSTECTOMY      HX KNEE ARTHROSCOPY Left       Prior to Admission medications    Medication Sig Start Date End Date Taking? Authorizing Provider   digoxin (LANOXIN) 0.25 mg tablet Take 0.25 mg by mouth daily. Yes Cedric, MD [facility]   acetaminophen (Tylenol Extra Strength) 500 mg tablet Take 1,000 mg by mouth two (2) times a day. Yes Provider, Historical   apixaban (Eliquis) 5 mg tablet Take 5 mg by mouth two (2) times a day. Yes Cedric, MD [facility]   midodrine (PROAMATINE) 2.5 mg tablet Take 2.5 mg by mouth three (3) times daily. Yes Cedric, MD [facility]   carbidopa-levodopa (SINEMET)  mg per tablet Take 1.5 Tablets by mouth four (4) times daily. Yes Cedric, MD [facility]     Current Facility-Administered Medications   Medication Dose Route Frequency    apixaban (ELIQUIS) tablet 5 mg  5 mg Oral BID    carbidopa-levodopa (SINEMET)  mg per tablet 1.5 Tablet  1.5 Tablet Oral QID    digoxin (LANOXIN) tablet 0.25 mg  0.25 mg Oral DAILY    [Held by provider] midodrine (PROAMATINE) tablet 2.5 mg  2.5 mg Oral TID    sodium chloride (NS) flush 5-40 mL  5-40 mL IntraVENous Q8H     No Known Allergies   Social History     Tobacco Use    Smoking status: Not on file    Smokeless tobacco: Not on file   Substance Use Topics    Alcohol use: Not on file      No family history on file. Laboratory: I have personally reviewed the critical care flowsheet and labs.      Recent Labs     04/04/22  0331   WBC 7.9   HGB 10.1*   HCT 35.3*        Recent Labs     04/04/22  0331      K 4.1   CL 94*   CO2 42* *   BUN 25*   CREA 1.15   CA 9.3   ALB 2.7*   ALT <6*       Objective:     Mode Rate Tidal Volume Pressure FiO2 PEEP                    Vital Signs:     TMAX(24)      Intake/Output:   Last shift:         Last 3 shifts: No intake/output data recorded. [id number] intake or output data in the 24 hours ending 04/04/22 0951  EXAM:   GENERAL: elderly, debilitated, not responsive, HEENT:  PERRL, EOMI, no alar flaring or epistaxis, oral mucosa moist without cyanosis, NECK:  no jugular vein distention, no retractions, no thyromegaly or masses, LUNGS: Decreased breathsounds in bases bilaterally, HEART:  Regular rate and rhythm with no MGR; trace edema is present, ABDOMEN:  soft with no tenderness, bowel sounds present, EXTREMITIES:  warm with no cyanosis, SKIN:  no jaundice or ecchymosis and NEUROLOGIC:  Lethargic, rowing eye movements    Karthikeyan Maldonado MD  [facility] PROCEDURES:  Removal, cerclage suture, Dwight 26-Jun-2023 09:21:46  Tatiana Heath
